# Patient Record
Sex: FEMALE | Race: BLACK OR AFRICAN AMERICAN | NOT HISPANIC OR LATINO | Employment: OTHER | URBAN - METROPOLITAN AREA
[De-identification: names, ages, dates, MRNs, and addresses within clinical notes are randomized per-mention and may not be internally consistent; named-entity substitution may affect disease eponyms.]

---

## 2017-02-17 ENCOUNTER — HOSPITAL ENCOUNTER (EMERGENCY)
Facility: MEDICAL CENTER | Age: 27
End: 2017-02-17
Attending: EMERGENCY MEDICINE
Payer: MEDICARE

## 2017-02-17 VITALS
TEMPERATURE: 98.4 F | OXYGEN SATURATION: 100 % | WEIGHT: 88.85 LBS | HEART RATE: 84 BPM | RESPIRATION RATE: 16 BRPM | SYSTOLIC BLOOD PRESSURE: 116 MMHG | BODY MASS INDEX: 15.74 KG/M2 | HEIGHT: 63 IN | DIASTOLIC BLOOD PRESSURE: 63 MMHG

## 2017-02-17 DIAGNOSIS — K04.7 DENTAL ABSCESS: ICD-10-CM

## 2017-02-17 PROCEDURE — 700101 HCHG RX REV CODE 250

## 2017-02-17 PROCEDURE — 700102 HCHG RX REV CODE 250 W/ 637 OVERRIDE(OP): Performed by: EMERGENCY MEDICINE

## 2017-02-17 PROCEDURE — 303977 HCHG I & D

## 2017-02-17 PROCEDURE — A9270 NON-COVERED ITEM OR SERVICE: HCPCS | Performed by: EMERGENCY MEDICINE

## 2017-02-17 PROCEDURE — 99283 EMERGENCY DEPT VISIT LOW MDM: CPT

## 2017-02-17 PROCEDURE — 304217 HCHG IRRIGATION SYSTEM

## 2017-02-17 RX ORDER — IBUPROFEN 800 MG/1
800 TABLET ORAL EVERY 8 HOURS PRN
Qty: 30 TAB | Refills: 0 | Status: SHIPPED | OUTPATIENT
Start: 2017-02-17 | End: 2017-05-26

## 2017-02-17 RX ORDER — LIDOCAINE HYDROCHLORIDE 20 MG/ML
INJECTION, SOLUTION INFILTRATION; PERINEURAL
Status: COMPLETED
Start: 2017-02-17 | End: 2017-02-17

## 2017-02-17 RX ORDER — IBUPROFEN 600 MG/1
600 TABLET ORAL ONCE
Status: COMPLETED | OUTPATIENT
Start: 2017-02-17 | End: 2017-02-17

## 2017-02-17 RX ORDER — CLINDAMYCIN HYDROCHLORIDE 150 MG/1
450 CAPSULE ORAL ONCE
Status: COMPLETED | OUTPATIENT
Start: 2017-02-17 | End: 2017-02-17

## 2017-02-17 RX ORDER — TRAMADOL HYDROCHLORIDE 50 MG/1
50 TABLET ORAL ONCE
Status: COMPLETED | OUTPATIENT
Start: 2017-02-17 | End: 2017-02-17

## 2017-02-17 RX ORDER — CLINDAMYCIN HYDROCHLORIDE 150 MG/1
450 CAPSULE ORAL 3 TIMES DAILY
Qty: 90 CAP | Refills: 0 | Status: SHIPPED | OUTPATIENT
Start: 2017-02-17 | End: 2017-02-27

## 2017-02-17 RX ORDER — HYDROCODONE BITARTRATE AND ACETAMINOPHEN 5; 325 MG/1; MG/1
1 TABLET ORAL EVERY 4 HOURS PRN
Qty: 12 TAB | Refills: 0 | Status: SHIPPED | OUTPATIENT
Start: 2017-02-17 | End: 2017-05-26

## 2017-02-17 RX ORDER — LIDOCAINE HYDROCHLORIDE 20 MG/ML
20 INJECTION, SOLUTION INFILTRATION; PERINEURAL ONCE
Status: COMPLETED | OUTPATIENT
Start: 2017-02-17 | End: 2017-02-17

## 2017-02-17 RX ADMIN — LIDOCAINE HYDROCHLORIDE 20 ML: 20 INJECTION, SOLUTION INFILTRATION; PERINEURAL at 09:15

## 2017-02-17 RX ADMIN — IBUPROFEN 600 MG: 600 TABLET, FILM COATED ORAL at 09:25

## 2017-02-17 RX ADMIN — CLINDAMYCIN HYDROCHLORIDE 450 MG: 150 CAPSULE ORAL at 09:25

## 2017-02-17 RX ADMIN — TRAMADOL HYDROCHLORIDE 50 MG: 50 TABLET, COATED ORAL at 09:25

## 2017-02-17 NOTE — ED AVS SNAPSHOT
CreaWor Access Code: [email or web address]  Expires: 3/19/2017 10:03 AM    Your email address is not on file at [facility].  Email Addresses are required for you to sign up for CreaWor, please contact [phone] to verify your personal information and to provide your email address prior to attempting to register for CreaWor.    Nanci Mccormack  [address] [place] [address]    CreaWor  A secure, online tool to manage your health information     [facility]’s CreaWor® is a secure, online tool that connects you to your personalized health information from the privacy of your home -- day or night - making it very easy for you to manage your healthcare. Once the activation process is completed, you can even access your medical information using the CreaWor iwona, which is available for free in the Apple Iwona store or Google Play store.     To learn more about CreaWor, visit [email or web address]    There are two levels of access available (as shown below):   My Chart Features  [facility] [place] Primary Care Doctor [facility]  Specialists [facility]  Urgent  Care Non-[facility] Primary Care Doctor   Email your healthcare team securely and privately 24/7 X X X    Manage appointments: schedule your next appointment; view details of past/upcoming appointments X      Request prescription refills. X      View recent personal medical records, including lab and immunizations X X X X   View health record, including health history, allergies, medications X X X X   Read reports about your outpatient visits, procedures, consult and ER notes X X X X   See your discharge summary, which is a recap of your hospital and/or ER visit that includes your diagnosis, lab results, and care plan X X  X     How to register for [employer]:  Once your e-mail address has been verified, follow the following steps to sign up for CreaWor.     1. Go to  [email or web address]  2. Click on the Sign Up Now box, which takes you to the New Member Sign Up page. You will need  to provide the following information:  a. Enter your [facility]Care Access Code exactly as it appears at the top of this page. (You will not need to use this code after you’ve completed the sign-up process. If you do not sign up before the expiration date, you must request a new code.)   b. Enter your date of birth.   c. Enter your home email address.   d. Click Submit, and follow the next screen’s instructions.  3. Create a ChipCare ID. This will be your ChipCare login ID and cannot be changed, so think of one that is secure and easy to remember.  4. Create a ChipCare password. You can change your password at any time.  5. Enter your Password Reset Question and Answer. This can be used at a later time if you forget your password.   6. Enter your e-mail address. This allows you to receive e-mail notifications when new information is available in ChipCare.  7. Click Sign Up. You can now view your health information.    For assistance activating your ChipCare account, call [phone]

## 2017-02-17 NOTE — ED NOTES
Chief Complaint   Patient presents with   • Dental Pain     Right upper x 1 week.  Pt reports she has a history of broken tooth and has no dental care.    Pt to triage in NAD.  Pt educated on triage process and instructed to notify triage RN of any change in status.

## 2017-02-17 NOTE — ED NOTES
Late Entry: Patient medicated for pain and first dose of abx given.      Patient given discharge instructions, prescriptions x 3, and follow up information, instructed not to drive while taking narcotics, provided with dental referral list, verbalized understanding.

## 2017-02-17 NOTE — ED AVS SNAPSHOT
2/17/2017          Nanci Mccormack  [address] [place] [address]    Dear Nanci:    [facility] wants to ensure your discharge home is safe and you or your loved ones have had all your questions answered regarding your care after you leave the hospital.    You may receive a telephone call within two days of your discharge.  This call is to make certain you understand your discharge instructions as well as ensure we provided you with the best care possible during your stay with us.     The call will only last approximately 3-5 minutes and will be done by a nurse.    Once again, we want to ensure your discharge home is safe and that you have a clear understanding of any next steps in your care.  If you have any questions or concerns, please do not hesitate to contact us, we are here for you.  Thank you for choosing [facility] for your healthcare needs.    Sincerely,    Gael Toribio    [facility]

## 2017-02-17 NOTE — DISCHARGE INSTRUCTIONS
Fill your antibiotic prescription and use it as directed until all pills are gone, even if you feel better 1st. We've prescribed some pain medication. You have a few days of Norco, and then a prescription for Motrin which you should switch to after the Norco is gone. It is extremely important that you use the dental referral sheet we have provided to schedule a dental visit. The tooth that is causing your problems needs to be extracted. The [facility] dental clinic may be the easiest and least expensive and fastest place to have this done. Return to the ER for worsening symptoms.    Dental Abscess  A dental abscess is pus in or around a tooth.  HOME CARE  · Take medicines only as told by your dentist.  · If you were prescribed antibiotic medicine, finish all of it even if you start to feel better.  · Rinse your mouth (gargle) often with salt water.  · Do not drive or use heavy machinery, like a , while taking pain medicine.  · Do not apply heat to the outside of your mouth.  · Keep all follow-up visits as told by your dentist. This is important.  GET HELP IF:  · Your pain is worse, and medicine does not help.  GET HELP RIGHT AWAY IF:  · You have a fever or chills.  · Your symptoms suddenly get worse.  · You have a very bad headache.  · You have problems breathing or swallowing.  · You have trouble opening your mouth.  · You have puffiness (swelling) in your neck or around your eye.     This information is not intended to replace advice given to you by your health care provider. Make sure you discuss any questions you have with your health care provider.     Document Released: 05/03/2016 Document Reviewed: 12/15/2015  [employer] Interactive Patient Education ©2016 [employer]

## 2017-02-17 NOTE — ED AVS SNAPSHOT
Home Care Instructions                                                                                                                Nanci Mccormack   MRN: [id number]    Department:  [facility], Emergency Dept   Date of Visit:  2/17/2017            [facility], Emergency Dept    [address]    [place] [address]    Phone:  [phone]      You were seen by     Sanjeev Huggins M.D.      Your Diagnosis Was     Dental abscess     K04.7       These are the medications you received during your hospitalization from 02/17/2017 0834 to 02/17/2017 1003     Date/Time Order Dose Route Action    02/17/2017 0925 clindamycin (CLEOCIN) capsule 450 mg 450 mg Oral Given    02/17/2017 0925 ibuprofen (MOTRIN) tablet 600 mg 600 mg Oral Given    02/17/2017 0925 tramadol (ULTRAM) 50 MG tablet 50 mg 50 mg Oral Given    02/17/2017 0915 lidocaine (XYLOCAINE) 2 % injection 20 mL 20 mL Other Given      Medication Information     Review all of your home medications and newly ordered medications with your primary doctor and/or pharmacist as soon as possible. Follow medication instructions as directed by your doctor and/or pharmacist.     Please keep your complete medication list with you and share with your physician. Update the information when medications are discontinued, doses are changed, or new medications (including over-the-counter products) are added; and carry medication information at all times in the event of emergency situations.               Medication List      START taking these medications        Instructions    clindamycin 150 MG Caps   Commonly known as:  CLEOCIN    Take 3 Caps by mouth 3 times a day for 10 days.   Dose:  450 mg       hydrocodone-acetaminophen 5-325 MG Tabs per tablet   Commonly known as:  NORCO    Take 1 Tab by mouth every four hours as needed.   Dose:  1 Tab       ibuprofen 800 MG Tabs   Commonly known as:  MOTRIN    Take 1 Tab by mouth every 8 hours as  needed.   Dose:  800 mg         ASK your doctor about these medications        Instructions    acetaminophen/caffeine/butalbital 300-40-50 mg -40 MG Caps capsule   Commonly known as:  FIORICET    Take 1 Cap by mouth every four hours as needed for Headache.   Dose:  1 Cap       Glatiramer Acetate 20 MG/ML Sosy   Commonly known as:  COPAXONE    Inject 40 mg as instructed every 3 days.   Dose:  40 mg                 Discharge Instructions       Fill your antibiotic prescription and use it as directed until all pills are gone, even if you feel better 1st. We've prescribed some pain medication. You have a few days of Norco, and then a prescription for Motrin which you should switch to after the Norco is gone. It is extremely important that you use the dental referral sheet we have provided to schedule a dental visit. The tooth that is causing your problems needs to be extracted. The [facility] dental clinic may be the easiest and least expensive and fastest place to have this done. Return to the ER for worsening symptoms.    Dental Abscess  A dental abscess is pus in or around a tooth.  HOME CARE  · Take medicines only as told by your dentist.  · If you were prescribed antibiotic medicine, finish all of it even if you start to feel better.  · Rinse your mouth (gargle) often with salt water.  · Do not drive or use heavy machinery, like a , while taking pain medicine.  · Do not apply heat to the outside of your mouth.  · Keep all follow-up visits as told by your dentist. This is important.  GET HELP IF:  · Your pain is worse, and medicine does not help.  GET HELP RIGHT AWAY IF:  · You have a fever or chills.  · Your symptoms suddenly get worse.  · You have a very bad headache.  · You have problems breathing or swallowing.  · You have trouble opening your mouth.  · You have puffiness (swelling) in your neck or around your eye.     This information is not intended to replace advice given to you by your  health care provider. Make sure you discuss any questions you have with your health care provider.     Document Released: 05/03/2016 Document Reviewed: 12/15/2015  Elsevier Interactive Patient Education ©2016 [employer].io Inc.            Patient Information     Patient Information    Following emergency treatment: all patient requiring follow-up care must return either to a private physician or a clinic if your condition worsens before you are able to obtain further medical attention, please return to the emergency room.     Billing Information    At [facility], we work to make the billing process streamlined for our patients.  Our Representatives are here to answer any questions you may have regarding your hospital bill.  If you have insurance coverage and have supplied your insurance information to us, we will submit a claim to your insurer on your behalf.  Should you have any questions regarding your bill, we can be reached online or by phone as follows:  Online: You are able pay your bills online or live chat with our representatives about any billing questions you may have. We are here to help Monday - Friday from 8:00am to 7:30pm and 9:00am - 12:00pm on Saturdays.  Please visit [email or web address]  for more information.   Phone:  [phone] or [phone]    Please note that your emergency physician, surgeon, pathologist, radiologist, anesthesiologist, and other specialists are not employed by [facility] and will therefore bill separately for their services.  Please contact them directly for any questions concerning their bills at the numbers below:     Emergency Physician Services:  [phone]  [facility]:  [phone]  [facility]:  [phone]  [facility]:  [phone]    1. Your final bill may vary from the amount quoted upon discharge if all procedures are not complete at that time, or if your doctor has additional  procedures of which we are not aware. You will receive an additional bill if you return to the Emergency Department at [facility] for suture removal regardless of the facility of which the sutures were placed.     2. Please arrange for settlement of this account at the emergency registration.    3. All self-pay accounts are due in full at the time of treatment.  If you are unable to meet this obligation then payment is expected within 4-5 days.     4. If you have had radiology studies (CT, X-ray, Ultrasound, MRI), you have received a preliminary result during your emergency department visit. Please contact the radiology department [phone] to receive a copy of your final result. Please discuss the Final result with your primary physician or with the follow up physician provided.     Crisis Hotline:  [place] Crisis Hotline:  0-568-LYVEIBQ or 1-806.264.2313  [place] Crisis Hotline:    1-636.687.2914 or 906-853-8969         ED Discharge Follow Up Questions    1. In order to provide you with very good care, we would like to follow up with a phone call in the next few days.  May we have your permission to contact you?     YES /  NO    2. What is the best phone number to call you? (       )_____-__________    3. What is the best time to call you?      Morning  /  Afternoon  /  Evening                   Patient Signature:  ____________________________________________________________    Date:  ____________________________________________________________

## 2017-02-17 NOTE — ED PROVIDER NOTES
ED Provider Note    Scribed for Sanjeev Huggins M.D. by Aline Cleary. 2/17/2017  9:07 AM    CHIEF COMPLAINT  Chief Complaint   Patient presents with   • Dental Pain     Right upper x 1 week.  Pt reports she has a history of broken tooth and has no dental care.      HPI  Nanci Mccormack is a 27 y.o. Female with a history of MS who presents to the Emergency Room for right upper dental pain onset one week ago. She describes the pain as constant, dull and aching. The patient notes that she has a history of a broken tooth in that location.  She states that she is experiencing jaw pain, headache, and neck pain secondary to her dental pain.  The patient states that she has not seen a dentist nor does she receive any regular dental care.  The patient denies any fevers or chills.   Is constant, aggressively reverse, moderate to severe, and she does not have any significant relief from over-the-counter medications.    REVIEW OF SYSTEMS  See HPI for further details.     PAST MEDICAL HISTORY   has a past medical history of MS (multiple sclerosis) (CMS-[facility]).    SOCIAL HISTORY  Social History     Social History Main Topics   • Smoking status: Never Smoker    • Alcohol Use: Yes   • Drug Use: Yes     Special: Inhaled      Comment: daily marijuana     SURGICAL HISTORY  patient denies any surgical history    CURRENT MEDICATIONS  No current facility-administered medications on file prior to encounter.     Current Outpatient Prescriptions on File Prior to Encounter   Medication Sig Dispense Refill   • Glatiramer Acetate (COPAXONE) 20 MG/ML Solution Prefilled Syringe Inject 40 mg as instructed every 3 days. 40 Syringe 11   • acetaminophen/caffeine/butalbital 300-40-50 mg (FIORICET) -40 MG Cap capsule Take 1 Cap by mouth every four hours as needed for Headache. 10 Cap 0     ALLERGIES  Allergies   Allergen Reactions   • Pcn [Penicillins]      Hives       PHYSICAL EXAM  VITAL SIGNS: /63 mmHg  Pulse 78   "Temp(Src) 36.9 °C (98.4 °F) (Temporal)  Resp 14  Ht 1.6 m (5' 3\")  Wt 40.3 kg (88 lb 13.5 oz)  BMI 15.74 kg/m2  SpO2 100%  Pulse ox interpretation: I interpret this pulse ox as normal.  Constitutional: Alert in no apparent distress.  HENT: Normocephalic, Atraumatic, Bilateral external ears normal. Nose normal. First right maxillary molar chronically eroded to the gumline, anterior to that is a 2cm x 1cm fluctuant abscess in the buccal reflection   Eyes: Pupils are equal and reactive. Conjunctiva normal, non-icteric.   Heart: Regular rate and rythm, no murmurs.    Lungs: Clear to auscultation bilaterally.  Skin: Warm, Dry, No erythema, No rash.   Neurologic: Alert, Grossly non-focal.   Psychiatric: Affect normal, Judgment normal, Mood normal, Appears appropriate and not intoxicated.     Incision and Drainage Procedure Note    Indication: Abscess    Procedure: The patient was positioned appropriately. Local anesthesia was obtained by infiltration using 2% Lidocaine without epinephrine.  An incision was then made over the greatest area of fluctuance and approximately 3 cc of purulent material was expressed.   Patient was instructed to perform swish and spit through 12 ounces of water, on September time to remove   Any additional material.    The patient tolerated the procedure well.    Complications: None    COURSE & MEDICAL DECISION MAKING  The patient's VS, Nurses notes reviewed. (See chart for details)    9:07 AM Patient seen and examined at bedside. I explained that I will need to drain the patient's dental abscess. would provide the patient with a list of dentists that will accept her Medicaid so that she can receive dental care for her tooth and to prevent additional dental caries. Patient will be treated with 2% lidocaine 20mL injection, Cleocin 450mg capsule, Motrin 600mg tablet, Ultram 50mg tablet for her symptoms.     9:16 AM Numbed patient's mouth with 1cc 2% Lidocaine injection.     9:58 AM  Abscess " "drained, see procedure note above.     10:00 AM  - Re-examined; The patient is resting in bed. I discussed plans for discharge with a prescription for Cleocin 150mg tablet, Motrin 800mg tablet, Norco 5-325mg tablet. She was given a referral to follow up with a dentist and her primary care provider and instructed to return to the ED if her symptoms worsen.  She was given a dental referral sheet, and we discussed specifically the heart clinic and suppression of the dental clinic may be the most expedient unaffordable.  We specifically discussed the risks of not having this urinary tooth definitively managed. Patient understands and agrees. Her vitals prior to discharge are: /63 mmHg  Pulse 84  Temp(Src) 36.9 °C (98.4 °F) (Temporal)  Resp 16  Ht 1.6 m (5' 3\")  Wt 40.3 kg (88 lb 13.5 oz)  BMI 15.74 kg/m2  SpO2 100%    I reviewed prescription monitoring program for patient's narcotic use before prescribing a scheduled drug.The patient will not drink alcohol nor drive with prescribed medications. The patient will return for new or worsening symptoms and is stable at the time of discharge.    DISPOSITION:  Patient will be discharged home in stable condition.    FOLLOW UP:  No follow-up provider specified.    OUTPATIENT MEDICATIONS:  Discharge Medication List as of 2/17/2017 10:03 AM      START taking these medications    Details   clindamycin (CLEOCIN) 150 MG Cap Take 3 Caps by mouth 3 times a day for 10 days., Disp-90 Cap, R-0, Print Rx Paper      ibuprofen (MOTRIN) 800 MG Tab Take 1 Tab by mouth every 8 hours as needed., Disp-30 Tab, R-0, Print Rx Paper      hydrocodone-acetaminophen (NORCO) 5-325 MG Tab per tablet Take 1 Tab by mouth every four hours as needed., Disp-12 Tab, R-0, Print Rx Paper           FINAL IMPRESSION  1. Dental abscess         I, Aline Cleary (Carl), am scribing for, and in the presence of, Sanjeev Huggins M.D..    Electronically signed by: Aline BENITEZ " Madiha (Scribe), 2/17/2017    I, Sanjeev Huggins M.D. personally performed the services described in this documentation, as scribed by Aline Cleary in my presence, and it is both accurate and complete.    Scribed for Sanjeev Huggins M.D. by Aline Cleary. 2/17/201

## 2017-05-26 ENCOUNTER — APPOINTMENT (OUTPATIENT)
Dept: RADIOLOGY | Facility: MEDICAL CENTER | Age: 27
End: 2017-05-26
Attending: EMERGENCY MEDICINE
Payer: MEDICARE

## 2017-05-26 ENCOUNTER — HOSPITAL ENCOUNTER (EMERGENCY)
Facility: MEDICAL CENTER | Age: 27
End: 2017-05-26
Attending: EMERGENCY MEDICINE
Payer: MEDICARE

## 2017-05-26 VITALS
TEMPERATURE: 99.6 F | BODY MASS INDEX: 17.85 KG/M2 | HEIGHT: 62 IN | RESPIRATION RATE: 18 BRPM | SYSTOLIC BLOOD PRESSURE: 110 MMHG | WEIGHT: 97 LBS | OXYGEN SATURATION: 98 % | DIASTOLIC BLOOD PRESSURE: 77 MMHG | HEART RATE: 92 BPM

## 2017-05-26 DIAGNOSIS — M25.551 RIGHT HIP PAIN: ICD-10-CM

## 2017-05-26 LAB
ALBUMIN SERPL BCP-MCNC: 4.8 G/DL (ref 3.2–4.9)
ALBUMIN/GLOB SERPL: 1.7 G/DL
ALP SERPL-CCNC: 50 U/L (ref 30–99)
ALT SERPL-CCNC: 9 U/L (ref 2–50)
ANION GAP SERPL CALC-SCNC: 9 MMOL/L (ref 0–11.9)
AST SERPL-CCNC: 17 U/L (ref 12–45)
BASOPHILS # BLD AUTO: 0.5 % (ref 0–1.8)
BASOPHILS # BLD: 0.06 K/UL (ref 0–0.12)
BILIRUB SERPL-MCNC: 0.5 MG/DL (ref 0.1–1.5)
BUN SERPL-MCNC: 11 MG/DL (ref 8–22)
CALCIUM SERPL-MCNC: 9.8 MG/DL (ref 8.5–10.5)
CHLORIDE SERPL-SCNC: 107 MMOL/L (ref 96–112)
CK SERPL-CCNC: 71 U/L (ref 0–154)
CO2 SERPL-SCNC: 23 MMOL/L (ref 20–33)
CREAT SERPL-MCNC: 0.59 MG/DL (ref 0.5–1.4)
CRP SERPL HS-MCNC: <0.02 MG/DL (ref 0–0.75)
EOSINOPHIL # BLD AUTO: 0.13 K/UL (ref 0–0.51)
EOSINOPHIL NFR BLD: 1 % (ref 0–6.9)
ERYTHROCYTE [DISTWIDTH] IN BLOOD BY AUTOMATED COUNT: 44.2 FL (ref 35.9–50)
ERYTHROCYTE [SEDIMENTATION RATE] IN BLOOD BY WESTERGREN METHOD: 6 MM/HOUR (ref 0–20)
GFR SERPL CREATININE-BSD FRML MDRD: >60 ML/MIN/1.73 M 2
GLOBULIN SER CALC-MCNC: 2.9 G/DL (ref 1.9–3.5)
GLUCOSE SERPL-MCNC: 86 MG/DL (ref 65–99)
HCG SERPL QL: NEGATIVE
HCT VFR BLD AUTO: 43.1 % (ref 37–47)
HGB BLD-MCNC: 14.7 G/DL (ref 12–16)
IMM GRANULOCYTES # BLD AUTO: 0.03 K/UL (ref 0–0.11)
IMM GRANULOCYTES NFR BLD AUTO: 0.2 % (ref 0–0.9)
LYMPHOCYTES # BLD AUTO: 2.92 K/UL (ref 1–4.8)
LYMPHOCYTES NFR BLD: 22.9 % (ref 22–41)
MCH RBC QN AUTO: 31.8 PG (ref 27–33)
MCHC RBC AUTO-ENTMCNC: 34.1 G/DL (ref 33.6–35)
MCV RBC AUTO: 93.3 FL (ref 81.4–97.8)
MONOCYTES # BLD AUTO: 0.39 K/UL (ref 0–0.85)
MONOCYTES NFR BLD AUTO: 3.1 % (ref 0–13.4)
NEUTROPHILS # BLD AUTO: 9.23 K/UL (ref 2–7.15)
NEUTROPHILS NFR BLD: 72.3 % (ref 44–72)
NRBC # BLD AUTO: 0 K/UL
NRBC BLD AUTO-RTO: 0 /100 WBC
PLATELET # BLD AUTO: 258 K/UL (ref 164–446)
PMV BLD AUTO: 9.8 FL (ref 9–12.9)
POTASSIUM SERPL-SCNC: 4.1 MMOL/L (ref 3.6–5.5)
PROT SERPL-MCNC: 7.7 G/DL (ref 6–8.2)
RBC # BLD AUTO: 4.62 M/UL (ref 4.2–5.4)
SODIUM SERPL-SCNC: 139 MMOL/L (ref 135–145)
WBC # BLD AUTO: 12.8 K/UL (ref 4.8–10.8)

## 2017-05-26 PROCEDURE — 36415 COLL VENOUS BLD VENIPUNCTURE: CPT

## 2017-05-26 PROCEDURE — A9579 GAD-BASE MR CONTRAST NOS,1ML: HCPCS | Performed by: EMERGENCY MEDICINE

## 2017-05-26 PROCEDURE — 73723 MRI JOINT LWR EXTR W/O&W/DYE: CPT | Mod: RT

## 2017-05-26 PROCEDURE — 82550 ASSAY OF CK (CPK): CPT

## 2017-05-26 PROCEDURE — 96376 TX/PRO/DX INJ SAME DRUG ADON: CPT

## 2017-05-26 PROCEDURE — 85652 RBC SED RATE AUTOMATED: CPT

## 2017-05-26 PROCEDURE — 80053 COMPREHEN METABOLIC PANEL: CPT

## 2017-05-26 PROCEDURE — 700117 HCHG RX CONTRAST REV CODE 255: Performed by: EMERGENCY MEDICINE

## 2017-05-26 PROCEDURE — 700111 HCHG RX REV CODE 636 W/ 250 OVERRIDE (IP): Performed by: EMERGENCY MEDICINE

## 2017-05-26 PROCEDURE — 86140 C-REACTIVE PROTEIN: CPT

## 2017-05-26 PROCEDURE — 85025 COMPLETE CBC W/AUTO DIFF WBC: CPT

## 2017-05-26 PROCEDURE — 96375 TX/PRO/DX INJ NEW DRUG ADDON: CPT

## 2017-05-26 PROCEDURE — 84703 CHORIONIC GONADOTROPIN ASSAY: CPT

## 2017-05-26 PROCEDURE — 99284 EMERGENCY DEPT VISIT MOD MDM: CPT

## 2017-05-26 PROCEDURE — 73502 X-RAY EXAM HIP UNI 2-3 VIEWS: CPT | Mod: RT

## 2017-05-26 PROCEDURE — 96374 THER/PROPH/DIAG INJ IV PUSH: CPT

## 2017-05-26 RX ORDER — ONDANSETRON 2 MG/ML
4 INJECTION INTRAMUSCULAR; INTRAVENOUS ONCE
Status: COMPLETED | OUTPATIENT
Start: 2017-05-26 | End: 2017-05-26

## 2017-05-26 RX ORDER — METHYLPREDNISOLONE 4 MG/1
4 TABLET ORAL DAILY
Qty: 30 TAB | Refills: 0 | Status: SHIPPED | OUTPATIENT
Start: 2017-05-26 | End: 2017-07-20

## 2017-05-26 RX ORDER — LORAZEPAM 2 MG/ML
1 INJECTION INTRAMUSCULAR
Status: DISCONTINUED | OUTPATIENT
Start: 2017-05-26 | End: 2017-05-26 | Stop reason: HOSPADM

## 2017-05-26 RX ORDER — HYDROCODONE BITARTRATE AND ACETAMINOPHEN 5; 325 MG/1; MG/1
1-2 TABLET ORAL EVERY 4 HOURS PRN
Qty: 20 TAB | Refills: 0 | Status: SHIPPED | OUTPATIENT
Start: 2017-05-26 | End: 2017-07-20

## 2017-05-26 RX ADMIN — GADODIAMIDE 10 ML: 287 INJECTION INTRAVENOUS at 14:59

## 2017-05-26 RX ADMIN — HYDROMORPHONE HYDROCHLORIDE 1 MG: 1 INJECTION, SOLUTION INTRAMUSCULAR; INTRAVENOUS; SUBCUTANEOUS at 12:24

## 2017-05-26 RX ADMIN — LORAZEPAM 1 MG: 2 INJECTION INTRAMUSCULAR; INTRAVENOUS at 14:12

## 2017-05-26 RX ADMIN — HYDROMORPHONE HYDROCHLORIDE 1 MG: 1 INJECTION, SOLUTION INTRAMUSCULAR; INTRAVENOUS; SUBCUTANEOUS at 10:45

## 2017-05-26 RX ADMIN — ONDANSETRON 4 MG: 2 INJECTION INTRAMUSCULAR; INTRAVENOUS at 10:45

## 2017-05-26 ASSESSMENT — PAIN SCALES - GENERAL
PAINLEVEL_OUTOF10: 10
PAINLEVEL_OUTOF10: 8
PAINLEVEL_OUTOF10: 7

## 2017-05-26 NOTE — ED AVS SNAPSHOT
5/26/2017    Nanci Mccormack  [address] [place] [address]    Dear Nanci:    Psychiatric hospital wants to ensure your discharge home is safe and you or your loved ones have had all of your questions answered regarding your care after you leave the hospital.    Below is a list of resources and contact information should you have any questions regarding your hospital stay, follow-up instructions, or active medical symptoms.    Questions or Concerns Regarding… Contact   Medical Questions Related to Your Discharge  (7 days a week, 8am-5pm) Contact a Nurse Care Coordinator   [phone]   Medical Questions Not Related to Your Discharge  (24 hours a day / 7 days a week)  Contact the Nurse Health Line   [phone]    Medications or Discharge Instructions Refer to your discharge packet   or contact your [facility] Primary Care Provider   [phone]   Follow-up Appointment(s) Schedule your appointment via [employer]   or contact Scheduling [phone]   Billing Review your statement via [employer]  or contact Billing [phone]   Medical Records Review your records via [employer]   or contact Medical Records [phone]     You may receive a telephone call within two days of discharge. This call is to make certain you understand your discharge instructions and have the opportunity to have any questions answered. You can also easily access your medical information, test results and upcoming appointments via the [employer] free online health management tool. You can learn more and sign up at [employer]. For assistance setting up your [employer] account, please call [phone].    Once again, we want to ensure your discharge home is safe and that you have a clear understanding of any next steps in your care. If you have any questions or concerns, please do not hesitate to contact us, we are here for you. Thank you for choosing [facility] for your healthcare needs.    Sincerely,    Your [facility] Healthcare Team

## 2017-05-26 NOTE — ED PROVIDER NOTES
"ED Provider Note    CHIEF COMPLAINT  Chief Complaint   Patient presents with   • Leg Pain     right thigh pain/spasms. denies injury        HPI  Nanci Mccormack is a 27 y.o. female who presents for evaluation of severe right thigh pain, right hip pain. She is a history of multiple sclerosis. She is followed by neurology. She denies IV drugs. No high fevers or chills. She cannot distinctly recall any traumatic injury. She reports significant pain with range of motion. No high fevers or chills. Reports pain so bad that she cannot bear weight. No other symptoms noted    REVIEW OF SYSTEMS  See HPI for further details. No high fevers chills night sweats weight loss numbness tingling or weakness All other systems are negative.     PAST MEDICAL HISTORY  Past Medical History   Diagnosis Date   • MS (multiple sclerosis) ([facility])        FAMILY HISTORY  No history of bleeding disorder    SOCIAL HISTORY  Social History     Social History   • Marital Status: Single     Spouse Name: N/A   • Number of Children: N/A   • Years of Education: N/A     Social History Main Topics   • Smoking status: Never Smoker    • Smokeless tobacco: None   • Alcohol Use: Yes   • Drug Use: Yes     Special: Inhaled      Comment: daily marijuana   • Sexual Activity: Not Asked     Other Topics Concern   • None     Social History Narrative    denies IV drugs occasional marijuana    SURGICAL HISTORY  History reviewed. No pertinent past surgical history.  No major surgeries reported  CURRENT MEDICATIONS  Home Medications     Reviewed by Anayeli Gonzalez R.N. (Registered Nurse) on 05/26/17 at 1017  Med List Status: Partial    Medication Last Dose Status    Glatiramer Acetate (COPAXONE) 20 MG/ML Solution Prefilled Syringe 5/26/2017 Active                ALLERGIES  Allergies   Allergen Reactions   • Pcn [Penicillins]      Hives         PHYSICAL EXAM  VITAL SIGNS: /77 mmHg  Pulse 92  Temp(Src) 37.6 °C (99.6 °F)  Resp 18  Ht 1.575 m (5' 2\")  Wt 43.999 " kg (97 lb)  BMI 17.74 kg/m2  SpO2 98%  LMP 05/12/2017 Room air O2: 100    Constitutional: She appears uncomfortable  HENT: Normocephalic, Atraumatic, Bilateral external ears normal, Oropharynx moist, No oral exudates, Nose normal.   Eyes: PERRLA, EOMI, Conjunctiva normal, No discharge.   Neck: Normal range of motion, No tenderness, Supple, No stridor.   Cardiovascular: Normal heart rate, Normal rhythm, No murmurs, No rubs, No gallops.   Thorax & Lungs: Normal breath sounds, No respiratory distress, No wheezing, No chest tenderness.   Abdomen: Bowel sounds normal, Soft, No tenderness, No masses, No pulsatile masses.   Skin: Warm, Dry, No erythema, No rash.   Back: No tenderness, No CVA tenderness.   Extremities: Significant pain with range of motion on the right hip. No palpable effusion. No overlying skin changes no bony tenderness on the right knee or ankle. Distal neurovascular exam is normal good pulses   Neurologic: Alert & oriented x 3, Normal motor function, Normal sensory function, No focal deficits noted.   Psychiatric: Affect normal, Judgment normal, Mood normal.       RADIOLOGY/PROCEDURES  MR-HIP WITH & W/O RIGHT   Final Result      1.  No MR evidence for osteomyelitis.      2.  No significant hip effusion or synovial enhancement.      3.  2.7 cm vaginal cyst.      DX-HIP-COMPLETE - UNILATERAL 2+ RIGHT   Final Result      No radiographic evidence of acute traumatic injury.          Results for orders placed or performed during the hospital encounter of 05/26/17   CRP QUANTITIVE (NON-CARDIAC)   Result Value Ref Range    Stat C-Reactive Protein <0.02 0.00 - 0.75 mg/dL   WESTERGREN SED RATE   Result Value Ref Range    Sed Rate Westergren 6 0 - 20 mm/hour   CBC WITH DIFFERENTIAL   Result Value Ref Range    WBC 12.8 (H) 4.8 - 10.8 K/uL    RBC 4.62 4.20 - 5.40 M/uL    Hemoglobin 14.7 12.0 - 16.0 g/dL    Hematocrit 43.1 37.0 - 47.0 %    MCV 93.3 81.4 - 97.8 fL    MCH 31.8 27.0 - 33.0 pg    MCHC 34.1 33.6 -  35.0 g/dL    RDW 44.2 35.9 - 50.0 fL    Platelet Count 258 164 - 446 K/uL    MPV 9.8 9.0 - 12.9 fL    Neutrophils-Polys 72.30 (H) 44.00 - 72.00 %    Lymphocytes 22.90 22.00 - 41.00 %    Monocytes 3.10 0.00 - 13.40 %    Eosinophils 1.00 0.00 - 6.90 %    Basophils 0.50 0.00 - 1.80 %    Immature Granulocytes 0.20 0.00 - 0.90 %    Nucleated RBC 0.00 /100 WBC    Neutrophils (Absolute) 9.23 (H) 2.00 - 7.15 K/uL    Lymphs (Absolute) 2.92 1.00 - 4.80 K/uL    Monos (Absolute) 0.39 0.00 - 0.85 K/uL    Eos (Absolute) 0.13 0.00 - 0.51 K/uL    Baso (Absolute) 0.06 0.00 - 0.12 K/uL    Immature Granulocytes (abs) 0.03 0.00 - 0.11 K/uL    NRBC (Absolute) 0.00 K/uL   COMP METABOLIC PANEL   Result Value Ref Range    Sodium 139 135 - 145 mmol/L    Potassium 4.1 3.6 - 5.5 mmol/L    Chloride 107 96 - 112 mmol/L    Co2 23 20 - 33 mmol/L    Anion Gap 9.0 0.0 - 11.9    Glucose 86 65 - 99 mg/dL    Bun 11 8 - 22 mg/dL    Creatinine 0.59 0.50 - 1.40 mg/dL    Calcium 9.8 8.5 - 10.5 mg/dL    AST(SGOT) 17 12 - 45 U/L    ALT(SGPT) 9 2 - 50 U/L    Alkaline Phosphatase 50 30 - 99 U/L    Total Bilirubin 0.5 0.1 - 1.5 mg/dL    Albumin 4.8 3.2 - 4.9 g/dL    Total Protein 7.7 6.0 - 8.2 g/dL    Globulin 2.9 1.9 - 3.5 g/dL    A-G Ratio 1.7 g/dL   HCG QUAL SERUM   Result Value Ref Range    Beta-Hcg Qualitative Serum Negative Negative   CREATINE KINASE   Result Value Ref Range    CPK Total 71 0 - 154 U/L   ESTIMATED GFR   Result Value Ref Range    GFR If African American >60 >60 mL/min/1.73 m 2    GFR If Non African American >60 >60 mL/min/1.73 m 2      COURSE & MEDICAL DECISION MAKING  Pertinent Labs & Imaging studies reviewed. (See chart for details)  An IV was established. The patient was given IV pain and nausea medication. Initial radiograph is negative white count was slightly elevated but her inflammatory markers were normal. She continued to have significant pain with range of motion the right hip. She does not have discrete risk factors such as  HIV or IV drug use for septic hip or with the level pain always worried about the case. Subsequent MRI of the hip did not penetrate any inflammation or any obvious effusion strongly suggesting against septic hip. I think she likely has a severe hip strain. I'll send her home on NSAIDs pain meds and they drove dose pack. I recommended following up here or with her PCP in 1-2 days if symptoms progress or worsen    FINAL IMPRESSION  1.   1. Right hip pain             Electronically signed by: Rickie De La Cruz, 5/26/2017 10:33 AM

## 2017-05-26 NOTE — ED NOTES
Discharge instructions given.  All questions answered.  Prescriptions given x2.  Pt to follow-up with pcp, return here if symptoms worsen.  Pt verbalized understanding.  All belongings with pt.  Pt ambulated with use of crutches to lobby.

## 2017-05-26 NOTE — ED NOTES
"Chief Complaint   Patient presents with   • Leg Pain     after walking up the stairs last night. pt reports that she has not injured leg. no pain when sitting only when attempting to bare weight     Hx MS.  Blood pressure 110/77, pulse 94, temperature 37.6 °C (99.6 °F), resp. rate 20, height 1.575 m (5' 2\"), SpO2 100 %.  Pt informed of wait times. Educated on triage process.  Asked to return to triage RN for any new or worsening of symptoms. Thanked for patience.        "

## 2017-05-26 NOTE — ED NOTES
Report rec'd from SHIVANI Guadalupe.  Pt reports pain increasing since xray.  Pt requesting more pain medication.  Will inform ERP.

## 2017-05-26 NOTE — ED AVS SNAPSHOT
DecoSnap Access Code: [email or web address]  Expires: 6/11/2017 10:59 AM    Your email address is not on file at [facility].  Email Addresses are required for you to sign up for DecoSnap, please contact [phone] to verify your personal information and to provide your email address prior to attempting to register for DecoSnap.    Nanci Mccormack  [address] [place] [address]    DecoSnap  A secure, online tool to manage your health information     [facility]’s DecoSnap® is a secure, online tool that connects you to your personalized health information from the privacy of your home -- day or night - making it very easy for you to manage your healthcare. Once the activation process is completed, you can even access your medical information using the DecoSnap iwona, which is available for free in the Apple Iwona store or Google Play store.     To learn more about DecoSnap, visit [email or web address]    There are two levels of access available (as shown below):   My Chart Features  [facility] Primary Care Doctor [facility]  Specialists [facility]  Urgent  Care Non-[facility] Primary Care Doctor   Email your healthcare team securely and privately 24/7 X X X    Manage appointments: schedule your next appointment; view details of past/upcoming appointments X      Request prescription refills. X      View recent personal medical records, including lab and immunizations X X X X   View health record, including health history, allergies, medications X X X X   Read reports about your outpatient visits, procedures, consult and ER notes X X X X   See your discharge summary, which is a recap of your hospital and/or ER visit that includes your diagnosis, lab results, and care plan X X  X     How to register for Lightspeedt:  Once your e-mail address has been verified, follow the following steps to sign up for Lightspeedt.     1. Go to  [email or web address]  2. Click on the Sign Up Now box, which takes you to the New Member Sign Up page. You will need  to provide the following information:  a. Enter your [employer] Access Code exactly as it appears at the top of this page. (You will not need to use this code after you’ve completed the sign-up process. If you do not sign up before the expiration date, you must request a new code.)   b. Enter your date of birth.   c. Enter your home email address.   d. Click Submit, and follow the next screen’s instructions.  3. Create a T[employer]koops ID. This will be your [employer] login ID and cannot be changed, so think of one that is secure and easy to remember.  4. Create a [employer] password. You can change your password at any time.  5. Enter your Password Reset Question and Answer. This can be used at a later time if you forget your password.   6. Enter your e-mail address. This allows you to receive e-mail notifications when new information is available in T[employer]ko[employer].  7. Click Sign Up. You can now view your health information.    For assistance activating your [employer] account, call [phone]

## 2017-05-26 NOTE — ED AVS SNAPSHOT
Home Care Instructions                                                                                                                Nanci Mccormack   MRN: [id number]    Department:  [facility], Emergency Dept   Date of Visit:  5/26/2017            [facility], Emergency Dept    [address] [address]    Phone:  [phone]      You were seen by     Rickie De La Cruz M.D.      Your Diagnosis Was     Right hip pain     M25.551       These are the medications you received during your hospitalization from 05/26/2017 0945 to 05/26/2017 1556     Date/Time Order Dose Route Action    05/26/2017 1045 ondansetron (ZOFRAN) syringe/vial injection 4 mg 4 mg Intravenous Given    05/26/2017 1045 HYDROmorphone (DILAUDID) injection 1 mg 1 mg Intravenous Given    05/26/2017 1412 lorazepam (ATIVAN) injection 1 mg 1 mg Intravenous Given    05/26/2017 1224 HYDROmorphone (DILAUDID) injection 1 mg 1 mg Intravenous Given    05/26/2017 1459 gadodiamide (OMNISCAN) SOLN 10 mL 10 mL Intravenous Given      Follow-up Information     1. Follow up with [facility], Emergency Dept In 1 day.    Specialty:  Emergency Medicine    Why:  As needed, If symptoms worsen    Contact information    [address] [place] [id number] [phone]      Medication Information     Review all of your home medications and newly ordered medications with your primary doctor and/or pharmacist as soon as possible. Follow medication instructions as directed by your doctor and/or pharmacist.     Please keep your complete medication list with you and share with your physician. Update the information when medications are discontinued, doses are changed, or new medications (including over-the-counter products) are added; and carry medication information at all times in the event of emergency situations.               Medication List      START taking these medications        Instructions    Morning Afternoon Evening Bedtime    hydrocodone-acetaminophen 5-325 MG Tabs per tablet   Commonly known as:  NORCO        Take 1-2 Tabs by mouth every four hours as needed.   Dose:  1-2 Tab                        methylPREDNISolone 4 MG Tabs   Commonly known as:  MEDROL        Take 1 Tab by mouth every day.   Dose:  4 mg                          ASK your doctor about these medications        Instructions    Morning Afternoon Evening Bedtime    Glatiramer Acetate 20 MG/ML Sosy   Commonly known as:  COPAXONE        Inject 40 mg as instructed every 3 days.   Dose:  40 mg                             Where to Get Your Medications      You can get these medications from any pharmacy     Bring a paper prescription for each of these medications    - hydrocodone-acetaminophen 5-325 MG Tabs per tablet  - methylPREDNISolone 4 MG Tabs            Procedures and tests performed during your visit     CBC WITH DIFFERENTIAL    COMP METABOLIC PANEL    CREATINE KINASE    CRP QUANTITIVE (NON-CARDIAC)    DX-HIP-COMPLETE - UNILATERAL 2+ RIGHT    ESTIMATED GFR    HCG QUAL SERUM    MR-HIP WITH & W/O RIGHT    SALINE LOCK    WESTERGREN SED RATE        Discharge Instructions       Hip Pain  Your hip is the joint between your upper legs and your lower pelvis. The bones, cartilage, tendons, and muscles of your hip joint perform a lot of work each day supporting your body weight and allowing you to move around.  Hip pain can range from a minor ache to severe pain in one or both of your hips. Pain may be felt on the inside of the hip joint near the groin, or the outside near the buttocks and upper thigh. You may have swelling or stiffness as well.   HOME CARE INSTRUCTIONS   · Take medicines only as directed by your health care provider.  · Apply ice to the injured area:  ¨ Put ice in a plastic bag.  ¨ Place a towel between your skin and the bag.  ¨ Leave the ice on for 15-20 minutes at a time, 3-4 times a day.  · Keep your leg raised (elevated)  when possible to lessen swelling.  · Avoid activities that cause pain.  · Follow specific exercises as directed by your health care provider.  · Sleep with a pillow between your legs on your most comfortable side.  · Record how often you have hip pain, the location of the pain, and what it feels like.  SEEK MEDICAL CARE IF:   · You are unable to put weight on your leg.  · Your hip is red or swollen or very tender to touch.  · Your pain or swelling continues or worsens after 1 week.  · You have increasing difficulty walking.  · You have a fever.  SEEK IMMEDIATE MEDICAL CARE IF:   · You have fallen.  · You have a sudden increase in pain and swelling in your hip.  MAKE SURE YOU:   · Understand these instructions.  · Will watch your condition.  · Will get help right away if you are not doing well or get worse.     This information is not intended to replace advice given to you by your health care provider. Make sure you discuss any questions you have with your health care provider.     Document Released: 06/07/2011 Document Revised: 01/08/2016 Document Reviewed: 08/14/2014  Else"[employer] Interactive Patient Education ©2016 [employer]            Patient Information     Patient Information    Following emergency treatment: all patient requiring follow-up care must return either to a private physician or a clinic if your condition worsens before you are able to obtain further medical attention, please return to the emergency room.     Billing Information    At [facility], we work to make the billing process streamlined for our patients.  Our Representatives are here to answer any questions you may have regarding your hospital bill.  If you have insurance coverage and have supplied your insurance information to us, we will submit a claim to your insurer on your behalf.  Should you have any questions regarding your bill, we can be reached online or by phone as follows:  Online: You are able pay your bills online or live  chat with our representatives about any billing questions you may have. We are here to help Monday - Friday from 8:00am to 7:30pm and 9:00am - 12:00pm on Saturdays.  Please visit [email or web address]  for more information.   Phone:  [phone] or [phone]    Please note that your emergency physician, surgeon, pathologist, radiologist, anesthesiologist, and other specialists are not employed by [facility] and will therefore bill separately for their services.  Please contact them directly for any questions concerning their bills at the numbers below:     Emergency Physician Services:  [phone]  [facility]:  [phone]  [facility]:  [phone]  [facility]:  [phone]    1. Your final bill may vary from the amount quoted upon discharge if all procedures are not complete at that time, or if your doctor has additional procedures of which we are not aware. You will receive an additional bill if you return to the Emergency Department at [facility] for suture removal regardless of the facility of which the sutures were placed.     2. Please arrange for settlement of this account at the emergency registration.    3. All self-pay accounts are due in full at the time of treatment.  If you are unable to meet this obligation then payment is expected within 4-5 days.     4. If you have had radiology studies (CT, X-ray, Ultrasound, MRI), you have received a preliminary result during your emergency department visit. Please contact the radiology department [phone] to receive a copy of your final result. Please discuss the Final result with your primary physician or with the follow up physician provided.     Crisis Hotline:  [place] Crisis Hotline:  8-640-HJDTVYT or 1-869.288.2107  [place] Crisis Hotline:    1-284.836.6134 or 541-853-8532         ED Discharge Follow Up Questions    1. In order to provide you with very good  care, we would like to follow up with a phone call in the next few days.  May we have your permission to contact you?     YES /  NO    2. What is the best phone number to call you? (       )_____-__________    3. What is the best time to call you?      Morning  /  Afternoon  /  Evening                   Patient Signature:  ____________________________________________________________    Date:  ____________________________________________________________

## 2017-05-26 NOTE — DISCHARGE INSTRUCTIONS
Hip Pain  Your hip is the joint between your upper legs and your lower pelvis. The bones, cartilage, tendons, and muscles of your hip joint perform a lot of work each day supporting your body weight and allowing you to move around.  Hip pain can range from a minor ache to severe pain in one or both of your hips. Pain may be felt on the inside of the hip joint near the groin, or the outside near the buttocks and upper thigh. You may have swelling or stiffness as well.   HOME CARE INSTRUCTIONS   · Take medicines only as directed by your health care provider.  · Apply ice to the injured area:  ¨ Put ice in a plastic bag.  ¨ Place a towel between your skin and the bag.  ¨ Leave the ice on for 15-20 minutes at a time, 3-4 times a day.  · Keep your leg raised (elevated) when possible to lessen swelling.  · Avoid activities that cause pain.  · Follow specific exercises as directed by your health care provider.  · Sleep with a pillow between your legs on your most comfortable side.  · Record how often you have hip pain, the location of the pain, and what it feels like.  SEEK MEDICAL CARE IF:   · You are unable to put weight on your leg.  · Your hip is red or swollen or very tender to touch.  · Your pain or swelling continues or worsens after 1 week.  · You have increasing difficulty walking.  · You have a fever.  SEEK IMMEDIATE MEDICAL CARE IF:   · You have fallen.  · You have a sudden increase in pain and swelling in your hip.  MAKE SURE YOU:   · Understand these instructions.  · Will watch your condition.  · Will get help right away if you are not doing well or get worse.     This information is not intended to replace advice given to you by your health care provider. Make sure you discuss any questions you have with your health care provider.     Document Released: 06/07/2011 Document Revised: 01/08/2016 Document Reviewed: 08/14/2014  [employer] Interactive Patient Education ©2016 [employer]

## 2017-07-20 ENCOUNTER — HOSPITAL ENCOUNTER (EMERGENCY)
Facility: MEDICAL CENTER | Age: 27
End: 2017-07-20
Attending: EMERGENCY MEDICINE
Payer: MEDICARE

## 2017-07-20 VITALS
TEMPERATURE: 98.8 F | SYSTOLIC BLOOD PRESSURE: 122 MMHG | DIASTOLIC BLOOD PRESSURE: 78 MMHG | RESPIRATION RATE: 16 BRPM | HEIGHT: 62 IN | HEART RATE: 96 BPM | WEIGHT: 96 LBS | BODY MASS INDEX: 17.66 KG/M2 | OXYGEN SATURATION: 100 %

## 2017-07-20 DIAGNOSIS — G35 MS (MULTIPLE SCLEROSIS) (HCC): ICD-10-CM

## 2017-07-20 DIAGNOSIS — F12.90 MARIJUANA USE: ICD-10-CM

## 2017-07-20 DIAGNOSIS — M25.551 RIGHT HIP PAIN: ICD-10-CM

## 2017-07-20 LAB
ANION GAP SERPL CALC-SCNC: 9 MMOL/L (ref 0–11.9)
BASOPHILS # BLD AUTO: 0.7 % (ref 0–1.8)
BASOPHILS # BLD: 0.08 K/UL (ref 0–0.12)
BUN SERPL-MCNC: 10 MG/DL (ref 8–22)
CALCIUM SERPL-MCNC: 10.5 MG/DL (ref 8.5–10.5)
CHLORIDE SERPL-SCNC: 106 MMOL/L (ref 96–112)
CO2 SERPL-SCNC: 23 MMOL/L (ref 20–33)
CREAT SERPL-MCNC: 0.64 MG/DL (ref 0.5–1.4)
CRP SERPL HS-MCNC: 0.02 MG/DL (ref 0–0.75)
EOSINOPHIL # BLD AUTO: 0.27 K/UL (ref 0–0.51)
EOSINOPHIL NFR BLD: 2.5 % (ref 0–6.9)
ERYTHROCYTE [DISTWIDTH] IN BLOOD BY AUTOMATED COUNT: 42.3 FL (ref 35.9–50)
GFR SERPL CREATININE-BSD FRML MDRD: >60 ML/MIN/1.73 M 2
GLUCOSE SERPL-MCNC: 82 MG/DL (ref 65–99)
HCT VFR BLD AUTO: 45.3 % (ref 37–47)
HGB BLD-MCNC: 15.1 G/DL (ref 12–16)
IMM GRANULOCYTES # BLD AUTO: 0.02 K/UL (ref 0–0.11)
IMM GRANULOCYTES NFR BLD AUTO: 0.2 % (ref 0–0.9)
LYMPHOCYTES # BLD AUTO: 3.09 K/UL (ref 1–4.8)
LYMPHOCYTES NFR BLD: 28.2 % (ref 22–41)
MCH RBC QN AUTO: 31.5 PG (ref 27–33)
MCHC RBC AUTO-ENTMCNC: 33.3 G/DL (ref 33.6–35)
MCV RBC AUTO: 94.4 FL (ref 81.4–97.8)
MONOCYTES # BLD AUTO: 0.5 K/UL (ref 0–0.85)
MONOCYTES NFR BLD AUTO: 4.6 % (ref 0–13.4)
NEUTROPHILS # BLD AUTO: 7.01 K/UL (ref 2–7.15)
NEUTROPHILS NFR BLD: 63.8 % (ref 44–72)
NRBC # BLD AUTO: 0 K/UL
NRBC BLD AUTO-RTO: 0 /100 WBC
PLATELET # BLD AUTO: 304 K/UL (ref 164–446)
PMV BLD AUTO: 9.1 FL (ref 9–12.9)
POTASSIUM SERPL-SCNC: 3.9 MMOL/L (ref 3.6–5.5)
RBC # BLD AUTO: 4.8 M/UL (ref 4.2–5.4)
SODIUM SERPL-SCNC: 138 MMOL/L (ref 135–145)
WBC # BLD AUTO: 11 K/UL (ref 4.8–10.8)

## 2017-07-20 PROCEDURE — 700111 HCHG RX REV CODE 636 W/ 250 OVERRIDE (IP): Performed by: EMERGENCY MEDICINE

## 2017-07-20 PROCEDURE — 86140 C-REACTIVE PROTEIN: CPT

## 2017-07-20 PROCEDURE — 80048 BASIC METABOLIC PNL TOTAL CA: CPT

## 2017-07-20 PROCEDURE — 99284 EMERGENCY DEPT VISIT MOD MDM: CPT

## 2017-07-20 PROCEDURE — 96375 TX/PRO/DX INJ NEW DRUG ADDON: CPT

## 2017-07-20 PROCEDURE — 85025 COMPLETE CBC W/AUTO DIFF WBC: CPT

## 2017-07-20 PROCEDURE — 96374 THER/PROPH/DIAG INJ IV PUSH: CPT

## 2017-07-20 RX ORDER — ONDANSETRON 2 MG/ML
4 INJECTION INTRAMUSCULAR; INTRAVENOUS ONCE
Status: COMPLETED | OUTPATIENT
Start: 2017-07-20 | End: 2017-07-20

## 2017-07-20 RX ORDER — KETOROLAC TROMETHAMINE 30 MG/ML
30 INJECTION, SOLUTION INTRAMUSCULAR; INTRAVENOUS ONCE
Status: COMPLETED | OUTPATIENT
Start: 2017-07-20 | End: 2017-07-20

## 2017-07-20 RX ORDER — HYDROCODONE BITARTRATE AND ACETAMINOPHEN 5; 325 MG/1; MG/1
1-2 TABLET ORAL EVERY 4 HOURS PRN
Qty: 20 TAB | Refills: 0 | Status: SHIPPED | OUTPATIENT
Start: 2017-07-20 | End: 2017-10-04

## 2017-07-20 RX ORDER — METHYLPREDNISOLONE 4 MG/1
TABLET ORAL
Qty: 1 KIT | Refills: 0 | Status: SHIPPED | OUTPATIENT
Start: 2017-07-20 | End: 2017-10-04

## 2017-07-20 RX ORDER — ONDANSETRON 2 MG/ML
INJECTION INTRAMUSCULAR; INTRAVENOUS
Status: DISCONTINUED
Start: 2017-07-20 | End: 2017-07-20 | Stop reason: HOSPADM

## 2017-07-20 RX ADMIN — HYDROMORPHONE HYDROCHLORIDE 0.5 MG: 1 INJECTION, SOLUTION INTRAMUSCULAR; INTRAVENOUS; SUBCUTANEOUS at 15:47

## 2017-07-20 RX ADMIN — KETOROLAC TROMETHAMINE 30 MG: 30 INJECTION, SOLUTION INTRAMUSCULAR at 15:47

## 2017-07-20 RX ADMIN — ONDANSETRON 4 MG: 2 INJECTION INTRAMUSCULAR; INTRAVENOUS at 16:30

## 2017-07-20 ASSESSMENT — PAIN SCALES - GENERAL: PAINLEVEL_OUTOF10: 2

## 2017-07-20 NOTE — ED AVS SNAPSHOT
Home Care Instructions                                                                                                                Nanci Mccormack   MRN: [id number]    Department:  [facility], Emergency Dept   Date of Visit:  7/20/2017            [facility], Emergency Dept    [address] [place] [address]    Phone:  [phone]      You were seen by     Rosenda Newton M.D.      Your Diagnosis Was     Right hip pain     M25.551       These are the medications you received during your hospitalization from 07/20/2017 1321 to 07/20/2017 1711     Date/Time Order Dose Route Action    07/20/2017 1547 ketorolac (TORADOL) injection 30 mg 30 mg Intravenous Given    07/20/2017 1547 HYDROmorphone (DILAUDID) injection 0.5 mg 0.5 mg Intravenous Given    07/20/2017 1630 ondansetron (ZOFRAN) syringe/vial injection 4 mg 4 mg Intravenous Given      Follow-up Information     1. Follow up with [facility], Emergency Dept.    Specialty:  Emergency Medicine    Why:  return for worsening pain fevers or other concerns.    Contact information    [address] [place] [id number] [phone]      Medication Information     Review all of your home medications and newly ordered medications with your primary doctor and/or pharmacist as soon as possible. Follow medication instructions as directed by your doctor and/or pharmacist.     Please keep your complete medication list with you and share with your physician. Update the information when medications are discontinued, doses are changed, or new medications (including over-the-counter products) are added; and carry medication information at all times in the event of emergency situations.               Medication List      START taking these medications        Instructions    Morning Afternoon Evening Bedtime    MethylPREDNISolone 4 MG Tbpk   Commonly known as:  MEDROL DOSEPAK        Use as directed                             CONTINUE taking these medications        Instructions    Morning Afternoon Evening Bedtime    hydrocodone-acetaminophen 5-325 MG Tabs per tablet   Commonly known as:  NORCO        Take 1-2 Tabs by mouth every four hours as needed.   Dose:  1-2 Tab                          ASK your doctor about these medications        Instructions    Morning Afternoon Evening Bedtime    Glatiramer Acetate 20 MG/ML Sosy   Commonly known as:  COPAXONE        Inject 40 mg as instructed every 3 days.   Dose:  40 mg                             Where to Get Your Medications      You can get these medications from any pharmacy     Bring a paper prescription for each of these medications    - hydrocodone-acetaminophen 5-325 MG Tabs per tablet  - MethylPREDNISolone 4 MG Tbpk            Procedures and tests performed during your visit     BASIC METABOLIC PANEL    CBC WITH DIFFERENTIAL    CRP QUANTITIVE (NON-CARDIAC)    ESTIMATED GFR        Discharge Instructions       Musculoskeletal Pain  Musculoskeletal pain is muscle and josefina aches and pains. These pains can occur in any part of the body. Your caregiver may treat you without knowing the cause of the pain. They may treat you if blood or urine tests, X-rays, and other tests were normal.   CAUSES  There is often not a definite cause or reason for these pains. These pains may be caused by a type of germ (virus). The discomfort may also come from overuse. Overuse includes working out too hard when your body is not fit. Josefina aches also come from weather changes. Bone is sensitive to atmospheric pressure changes.  HOME CARE INSTRUCTIONS   · Ask when your test results will be ready. Make sure you get your test results.  · Only take over-the-counter or prescription medicines for pain, discomfort, or fever as directed by your caregiver. If you were given medications for your condition, do not drive, operate machinery or power tools, or sign legal documents for 24 hours. Do not drink  alcohol. Do not take sleeping pills or other medications that may interfere with treatment.  · Continue all activities unless the activities cause more pain. When the pain lessens, slowly resume normal activities. Gradually increase the intensity and duration of the activities or exercise.  · During periods of severe pain, bed rest may be helpful. Lay or sit in any position that is comfortable.  · Putting ice on the injured area.  ¨ Put ice in a bag.  ¨ Place a towel between your skin and the bag.  ¨ Leave the ice on for 15 to 20 minutes, 3 to 4 times a day.  · Follow up with your caregiver for continued problems and no reason can be found for the pain. If the pain becomes worse or does not go away, it may be necessary to repeat tests or do additional testing. Your caregiver may need to look further for a possible cause.  SEEK IMMEDIATE MEDICAL CARE IF:  · You have pain that is getting worse and is not relieved by medications.  · You develop chest pain that is associated with shortness or breath, sweating, feeling sick to your stomach (nauseous), or throw up (vomit).  · Your pain becomes localized to the abdomen.  · You develop any new symptoms that seem different or that concern you.  MAKE SURE YOU:   · Understand these instructions.  · Will watch your condition.  · Will get help right away if you are not doing well or get worse.     This information is not intended to replace advice given to you by your health care provider. Make sure you discuss any questions you have with your health care provider.     Document Released: 12/18/2006 Document Revised: 03/11/2013 Document Reviewed: 08/22/2014  Elsevier Interactive Patient Education ©2016 Elsevier Inc.            Patient Information     Patient Information    Following emergency treatment: all patient requiring follow-up care must return either to a private physician or a clinic if your condition worsens before you are able to obtain further medical attention,  please return to the emergency room.     Billing Information    At [facility], we work to make the billing process streamlined for our patients.  Our Representatives are here to answer any questions you may have regarding your hospital bill.  If you have insurance coverage and have supplied your insurance information to us, we will submit a claim to your insurer on your behalf.  Should you have any questions regarding your bill, we can be reached online or by phone as follows:  Online: You are able pay your bills online or live chat with our representatives about any billing questions you may have. We are here to help Monday - Friday from 8:00am to 7:30pm and 9:00am - 12:00pm on Saturdays.  Please visit [email or web address]  for more information.   Phone:  [phone] or [phone]    Please note that your emergency physician, surgeon, pathologist, radiologist, anesthesiologist, and other specialists are not employed by [facility] and will therefore bill separately for their services.  Please contact them directly for any questions concerning their bills at the numbers below:     Emergency Physician Services:  [phone]  [facility]:  [phone]  [facility]:  [phone]  [facility]:  [phone]    1. Your final bill may vary from the amount quoted upon discharge if all procedures are not complete at that time, or if your doctor has additional procedures of which we are not aware. You will receive an additional bill if you return to the Emergency Department at [facility] for suture removal regardless of the facility of which the sutures were placed.     2. Please arrange for settlement of this account at the emergency registration.    3. All self-pay accounts are due in full at the time of treatment.  If you are unable to meet this obligation then payment is expected within 4-5 days.     4. If you have had radiology  studies (CT, X-ray, Ultrasound, MRI), you have received a preliminary result during your emergency department visit. Please contact the radiology department [phone] to receive a copy of your final result. Please discuss the Final result with your primary physician or with the follow up physician provided.     Crisis Hotline:  [place] Crisis Hotline:  1-713-IXVITWW or 1-850.318.6258  [place] Crisis Hotline:    1-725.186.9978 or 735-821-8023         ED Discharge Follow Up Questions    1. In order to provide you with very good care, we would like to follow up with a phone call in the next few days.  May we have your permission to contact you?     YES /  NO    2. What is the best phone number to call you? (       )_____-__________    3. What is the best time to call you?      Morning  /  Afternoon  /  Evening                   Patient Signature:  ____________________________________________________________    Date:  ____________________________________________________________

## 2017-07-20 NOTE — ED NOTES
Pt hx of MS. Pt drove herself to ED, states uses crotches. Pt c/o right hip pain, chronic. Pt denies injury, c/o 10/10 pain.

## 2017-07-20 NOTE — ED AVS SNAPSHOT
[employer] Access Code: [email or web address]  Expires: 7/29/2017  4:10 AM    Your email address is not on file at [facility].  Email Addresses are required for you to sign up for [employer], please contact [phone] to verify your personal information and to provide your email address prior to attempting to register for [employer].    Nanci Mccormack  [address] [place] [address]    [employer]  A secure, online tool to manage your health information     [facility] [facility]’s [employer]® is a secure, online tool that connects you to your personalized health information from the privacy of your home -- day or night - making it very easy for you to manage your healthcare. Once the activation process is completed, you can even access your medical information using the [employer] iwona, which is available for free in the Apple Iwona store or Google Play store.     To learn more about [employer], visit [email or web address]    There are two levels of access available (as shown below):   My Chart Features  [facility] Primary Care Doctor [facility]  Specialists [facility]  Urgent  Care Non-[facility] Primary Care Doctor   Email your healthcare team securely and privately 24/7 X X X    Manage appointments: schedule your next appointment; view details of past/upcoming appointments X      Request prescription refills. X      View recent personal medical records, including lab and immunizations X X X X   View health record, including health history, allergies, medications X X X X   Read reports about your outpatient visits, procedures, consult and ER notes X X X X   See your discharge summary, which is a recap of your hospital and/or ER visit that includes your diagnosis, lab results, and care plan X X  X     How to register for [employer]:  Once your e-mail address has been verified, follow the following steps to sign up for [employer].     1. Go to  [email or web address]  2. Click on the Sign Up Now box, which takes you to the New Member Sign Up page. You will need  to provide the following information:  a. Enter your [employer] Access Code exactly as it appears at the top of this page. (You will not need to use this code after you’ve completed the sign-up process. If you do not sign up before the expiration date, you must request a new code.)   b. Enter your date of birth.   c. Enter your home email address.   d. Click Submit, and follow the next screen’s instructions.  3. Create a [employer] ID. This will be your [employer] login ID and cannot be changed, so think of one that is secure and easy to remember.  4. Create a [employer] password. You can change your password at any time.  5. Enter your Password Reset Question and Answer. This can be used at a later time if you forget your password.   6. Enter your e-mail address. This allows you to receive e-mail notifications when new information is available in [employer].  7. Click Sign Up. You can now view your health information.    For assistance activating your [employer] account, call [phone]

## 2017-07-20 NOTE — ED AVS SNAPSHOT
7/20/2017    Nanci Mccormack  [address] [place] [address]    Dear Nanci:    [facility] wants to ensure your discharge home is safe and you or your loved ones have had all of your questions answered regarding your care after you leave the hospital.    Below is a list of resources and contact information should you have any questions regarding your hospital stay, follow-up instructions, or active medical symptoms.    Questions or Concerns Regarding… Contact   Medical Questions Related to Your Discharge  (7 days a week, 8am-5pm) Contact a Nurse Care Coordinator   [phone]   Medical Questions Not Related to Your Discharge  (24 hours a day / 7 days a week)  Contact the Nurse Health Line   [phone]    Medications or Discharge Instructions Refer to your discharge packet   or contact your [facility] Primary Care Provider   [phone]   Follow-up Appointment(s) Schedule your appointment via [employer]   or contact Scheduling [phone]   Billing Review your statement via [employer]  or contact Billing [phone]   Medical Records Review your records via [employer]   or contact Medical Records [phone]     You may receive a telephone call within two days of discharge. This call is to make certain you understand your discharge instructions and have the opportunity to have any questions answered. You can also easily access your medical information, test results and upcoming appointments via the [employer] free online health management tool. You can learn more and sign up at [employer]. For assistance setting up your [employer] account, please call [phone].    Once again, we want to ensure your discharge home is safe and that you have a clear understanding of any next steps in your care. If you have any questions or concerns, please do not hesitate to contact us, we are here for you. Thank you for choosing [facility] for your healthcare needs.    Sincerely,    Your [facility] Healthcare Team

## 2017-07-20 NOTE — ED PROVIDER NOTES
"ED Provider Note    CHIEF COMPLAINT  Chief Complaint   Patient presents with   • Hip Pain       HPI  Nanci Mccormack is a 27 y.o. female who presents with right hip pain. She says it started hurting this morning she has some element of chronic pain all over and in this right hip. She was here 2 months ago for the same but she says today the pain is worse. She says it \"feels like it out of place but I know it's not\". She denies any fevers or chills. She specifically says she has had infections before and she does not feel like this is an infection. She felt like she lost her balance twice today did not fall on the right hip. The pain is worse when she moves. No weakness. She does have a history of MS.      REVIEW OF SYSTEMS  positive for right hip pain, negative for weakness fevers. All other systems are negative.     PAST MEDICAL HISTORY   has a past medical history of MS (multiple sclerosis) (CMS-[facility]).    SOCIAL HISTORY  Social History     Social History Main Topics   • Smoking status: Never Smoker    • Smokeless tobacco: Not on file   • Alcohol Use: Yes   • Drug Use: Yes     Special: Inhaled      Comment: daily marijuana   • Sexual Activity: Not on file       SURGICAL HISTORY  patient denies any surgical history    CURRENT MEDICATIONS  Home Medications     **Home medications have not yet been reviewed for this encounter**          ALLERGIES  Allergies   Allergen Reactions   • Pcn [Penicillins]      Hives         PHYSICAL EXAM  VITAL SIGNS: /87 mmHg  Pulse 103  Temp(Src) 37.1 °C (98.8 °F)  Resp 16  Ht 1.575 m (5' 2.01\")  Wt 43.545 kg (96 lb)  BMI 17.55 kg/m2  SpO2 100%.  Constitutional: Alert in no apparent distress. Tearful at times  HENT: No signs of trauma, Bilateral external ears normal, Nose normal.   Eyes: Pupils are equal and reactive, Conjunctiva normal, Non-icteric.   Neck: Normal range of motion, No tenderness, Supple, No stridor.   Cardiovascular: Regular rate and rhythm, no murmurs. "   Thorax & Lungs: Normal breath sounds, No respiratory distress, No wheezing, No chest tenderness.   Abdomen: Bowel sounds normal, Soft, No tenderness, No masses, No peritoneal signs.  Skin: Warm, Dry, No erythema, No rash.   Musculoskeletal:  no major deformities noted. Palpation with minimal touch over the right soft tissues of the right hip. No bony abnormality is  Neurologic: Alert,  No focal deficits noted.   Psychiatric: Affect normal, Judgment normal, Mood normal.       DIAGNOSTIC STUDIES / PROCEDURES      LABS  Results for orders placed or performed during the hospital encounter of 07/20/17   CBC WITH DIFFERENTIAL   Result Value Ref Range    WBC 11.0 (H) 4.8 - 10.8 K/uL    RBC 4.80 4.20 - 5.40 M/uL    Hemoglobin 15.1 12.0 - 16.0 g/dL    Hematocrit 45.3 37.0 - 47.0 %    MCV 94.4 81.4 - 97.8 fL    MCH 31.5 27.0 - 33.0 pg    MCHC 33.3 (L) 33.6 - 35.0 g/dL    RDW 42.3 35.9 - 50.0 fL    Platelet Count 304 164 - 446 K/uL    MPV 9.1 9.0 - 12.9 fL    Neutrophils-Polys 63.80 44.00 - 72.00 %    Lymphocytes 28.20 22.00 - 41.00 %    Monocytes 4.60 0.00 - 13.40 %    Eosinophils 2.50 0.00 - 6.90 %    Basophils 0.70 0.00 - 1.80 %    Immature Granulocytes 0.20 0.00 - 0.90 %    Nucleated RBC 0.00 /100 WBC    Neutrophils (Absolute) 7.01 2.00 - 7.15 K/uL    Lymphs (Absolute) 3.09 1.00 - 4.80 K/uL    Monos (Absolute) 0.50 0.00 - 0.85 K/uL    Eos (Absolute) 0.27 0.00 - 0.51 K/uL    Baso (Absolute) 0.08 0.00 - 0.12 K/uL    Immature Granulocytes (abs) 0.02 0.00 - 0.11 K/uL    NRBC (Absolute) 0.00 K/uL   BASIC METABOLIC PANEL   Result Value Ref Range    Sodium 138 135 - 145 mmol/L    Potassium 3.9 3.6 - 5.5 mmol/L    Chloride 106 96 - 112 mmol/L    Co2 23 20 - 33 mmol/L    Glucose 82 65 - 99 mg/dL    Creatinine 0.64 0.50 - 1.40 mg/dL    Calcium 10.5 8.5 - 10.5 mg/dL    Anion Gap 9.0 0.0 - 11.9    Bun 10 8 - 22 mg/dL   CRP QUANTITIVE (NON-CARDIAC)   Result Value Ref Range    Stat C-Reactive Protein 0.02 0.00 - 0.75 mg/dL   ESTIMATED  GFR   Result Value Ref Range    GFR If African American >60 >60 mL/min/1.73 m 2    GFR If Non African American >60 >60 mL/min/1.73 m 2           COURSE & MEDICAL DECISION MAKING  Pertinent Labs & Imaging studies reviewed. (See chart for details)  This is a 27-year-old feel that presents with acute on chronic right hip pain. Prior records indicate the patient was here in May for the same symptoms. At that time she had an MRI and x-ray both of which were negative for infection.    Repeat labs are performed she has a minimally elevated white blood cell count normal CRP. She is afebrile. Again I do not think this is acute infection given the lack of left shift or inflammatory response. She was treated with pain medication. Last time she was here she was given a Medrol Dosepak and pain medications which did improve the pain. She'll b given the same today. She is agreeable to this and will be discharged.e      The patient will return for new or worsening symptoms and is stable at the time of discharge. Patient was given return precautions. Patient and/or family member verbalizes understanding and will comply.    DISPOSITION:  Patient will be discharged home in stable condition.    FOLLOW UP:  [facility], Emergency Dept  [address] [facility] [address] [phone]    return for worsening pain fevers or other concerns.      OUTPATIENT MEDICATIONS:  Discharge Medication List as of 7/20/2017  5:11 PM      START taking these medications    Details   MethylPREDNISolone (MEDROL DOSEPAK) 4 MG Tablet Therapy Pack Use as directed, Disp-1 Kit, R-0, Print Rx Paper                   FINAL IMPRESSION  1. Right hip pain    2. MS (multiple sclerosis) (CMS-[facility])    3. Marijuana use          This dictation has been creating using voice recognition software. The accuracy of the dictation is limited the abilities of the software.  I expect there may be some errors of grammar and possibly content. I made every  attempt to manually correct the errors within my dictation. However errors related to this voice recognition software may still exist and should be interpreted within the appropriate context.      The note accurately reflects work and decisions made by me.  Rosenda Newton  7/20/2017  8:09 PM

## 2017-07-21 NOTE — DISCHARGE INSTRUCTIONS
Musculoskeletal Pain  Musculoskeletal pain is muscle and josefina aches and pains. These pains can occur in any part of the body. Your caregiver may treat you without knowing the cause of the pain. They may treat you if blood or urine tests, X-rays, and other tests were normal.   CAUSES  There is often not a definite cause or reason for these pains. These pains may be caused by a type of germ (virus). The discomfort may also come from overuse. Overuse includes working out too hard when your body is not fit. Josefina aches also come from weather changes. Bone is sensitive to atmospheric pressure changes.  HOME CARE INSTRUCTIONS   · Ask when your test results will be ready. Make sure you get your test results.  · Only take over-the-counter or prescription medicines for pain, discomfort, or fever as directed by your caregiver. If you were given medications for your condition, do not drive, operate machinery or power tools, or sign legal documents for 24 hours. Do not drink alcohol. Do not take sleeping pills or other medications that may interfere with treatment.  · Continue all activities unless the activities cause more pain. When the pain lessens, slowly resume normal activities. Gradually increase the intensity and duration of the activities or exercise.  · During periods of severe pain, bed rest may be helpful. Lay or sit in any position that is comfortable.  · Putting ice on the injured area.  ¨ Put ice in a bag.  ¨ Place a towel between your skin and the bag.  ¨ Leave the ice on for 15 to 20 minutes, 3 to 4 times a day.  · Follow up with your caregiver for continued problems and no reason can be found for the pain. If the pain becomes worse or does not go away, it may be necessary to repeat tests or do additional testing. Your caregiver may need to look further for a possible cause.  SEEK IMMEDIATE MEDICAL CARE IF:  · You have pain that is getting worse and is not relieved by medications.  · You develop chest pain  that is associated with shortness or breath, sweating, feeling sick to your stomach (nauseous), or throw up (vomit).  · Your pain becomes localized to the abdomen.  · You develop any new symptoms that seem different or that concern you.  MAKE SURE YOU:   · Understand these instructions.  · Will watch your condition.  · Will get help right away if you are not doing well or get worse.     This information is not intended to replace advice given to you by your health care provider. Make sure you discuss any questions you have with your health care provider.     Document Released: 12/18/2006 Document Revised: 03/11/2013 Document Reviewed: 08/22/2014  [employer] Interactive Patient Education ©2016 Elsevier Inc.

## 2017-10-04 ENCOUNTER — RESOLUTE PROFESSIONAL BILLING HOSPITAL PROF FEE (OUTPATIENT)
Dept: HOSPITALIST | Facility: MEDICAL CENTER | Age: 27
End: 2017-10-04
Payer: MEDICARE

## 2017-10-04 ENCOUNTER — APPOINTMENT (OUTPATIENT)
Dept: RADIOLOGY | Facility: MEDICAL CENTER | Age: 27
DRG: 060 | End: 2017-10-04
Attending: EMERGENCY MEDICINE
Payer: MEDICARE

## 2017-10-04 ENCOUNTER — HOSPITAL ENCOUNTER (INPATIENT)
Facility: MEDICAL CENTER | Age: 27
LOS: 3 days | DRG: 060 | End: 2017-10-09
Attending: EMERGENCY MEDICINE | Admitting: INTERNAL MEDICINE
Payer: MEDICARE

## 2017-10-04 PROBLEM — G35 MULTIPLE SCLEROSIS EXACERBATION (HCC): Status: ACTIVE | Noted: 2017-10-04

## 2017-10-04 PROBLEM — G89.4 CHRONIC PAIN SYNDROME: Status: ACTIVE | Noted: 2017-10-04

## 2017-10-04 LAB
ALBUMIN SERPL BCP-MCNC: 4.1 G/DL (ref 3.2–4.9)
ALBUMIN/GLOB SERPL: 1.5 G/DL
ALP SERPL-CCNC: 53 U/L (ref 30–99)
ALT SERPL-CCNC: 8 U/L (ref 2–50)
ANION GAP SERPL CALC-SCNC: 8 MMOL/L (ref 0–11.9)
APPEARANCE UR: CLEAR
APTT PPP: 24.6 SEC (ref 24.7–36)
AST SERPL-CCNC: 15 U/L (ref 12–45)
BASOPHILS # BLD AUTO: 0.6 % (ref 0–1.8)
BASOPHILS # BLD: 0.05 K/UL (ref 0–0.12)
BILIRUB SERPL-MCNC: 0.3 MG/DL (ref 0.1–1.5)
BILIRUB UR QL STRIP.AUTO: NEGATIVE
BUN SERPL-MCNC: 12 MG/DL (ref 8–22)
CALCIUM SERPL-MCNC: 9.4 MG/DL (ref 8.5–10.5)
CHLORIDE SERPL-SCNC: 107 MMOL/L (ref 96–112)
CO2 SERPL-SCNC: 22 MMOL/L (ref 20–33)
COLOR UR: YELLOW
CREAT SERPL-MCNC: 0.73 MG/DL (ref 0.5–1.4)
CULTURE IF INDICATED INDCX: NO UA CULTURE
EOSINOPHIL # BLD AUTO: 0.4 K/UL (ref 0–0.51)
EOSINOPHIL NFR BLD: 4.9 % (ref 0–6.9)
ERYTHROCYTE [DISTWIDTH] IN BLOOD BY AUTOMATED COUNT: 45.5 FL (ref 35.9–50)
EST. AVERAGE GLUCOSE BLD GHB EST-MCNC: 108 MG/DL
GFR SERPL CREATININE-BSD FRML MDRD: >60 ML/MIN/1.73 M 2
GLOBULIN SER CALC-MCNC: 2.7 G/DL (ref 1.9–3.5)
GLUCOSE SERPL-MCNC: 98 MG/DL (ref 65–99)
GLUCOSE UR STRIP.AUTO-MCNC: NEGATIVE MG/DL
HBA1C MFR BLD: 5.4 % (ref 0–5.6)
HCG SERPL QL: NEGATIVE
HCT VFR BLD AUTO: 41.4 % (ref 37–47)
HGB BLD-MCNC: 13.7 G/DL (ref 12–16)
IMM GRANULOCYTES # BLD AUTO: 0.01 K/UL (ref 0–0.11)
IMM GRANULOCYTES NFR BLD AUTO: 0.1 % (ref 0–0.9)
INR PPP: 1.09 (ref 0.87–1.13)
KETONES UR STRIP.AUTO-MCNC: NEGATIVE MG/DL
LACTATE BLD-SCNC: 1.4 MMOL/L (ref 0.5–2)
LEUKOCYTE ESTERASE UR QL STRIP.AUTO: NEGATIVE
LYMPHOCYTES # BLD AUTO: 3.41 K/UL (ref 1–4.8)
LYMPHOCYTES NFR BLD: 42 % (ref 22–41)
MCH RBC QN AUTO: 31.6 PG (ref 27–33)
MCHC RBC AUTO-ENTMCNC: 33.1 G/DL (ref 33.6–35)
MCV RBC AUTO: 95.6 FL (ref 81.4–97.8)
MICRO URNS: NORMAL
MONOCYTES # BLD AUTO: 0.32 K/UL (ref 0–0.85)
MONOCYTES NFR BLD AUTO: 3.9 % (ref 0–13.4)
NEUTROPHILS # BLD AUTO: 3.92 K/UL (ref 2–7.15)
NEUTROPHILS NFR BLD: 48.5 % (ref 44–72)
NITRITE UR QL STRIP.AUTO: NEGATIVE
NRBC # BLD AUTO: 0 K/UL
NRBC BLD AUTO-RTO: 0 /100 WBC
PH UR STRIP.AUTO: 6 [PH]
PLATELET # BLD AUTO: 236 K/UL (ref 164–446)
PMV BLD AUTO: 9.4 FL (ref 9–12.9)
POTASSIUM SERPL-SCNC: 4.1 MMOL/L (ref 3.6–5.5)
PROT SERPL-MCNC: 6.8 G/DL (ref 6–8.2)
PROT UR QL STRIP: NEGATIVE MG/DL
PROTHROMBIN TIME: 14.5 SEC (ref 12–14.6)
RBC # BLD AUTO: 4.33 M/UL (ref 4.2–5.4)
RBC UR QL AUTO: NEGATIVE
SODIUM SERPL-SCNC: 137 MMOL/L (ref 135–145)
SP GR UR STRIP.AUTO: 1.02
TROPONIN I SERPL-MCNC: <0.01 NG/ML (ref 0–0.04)
UROBILINOGEN UR STRIP.AUTO-MCNC: 0.2 MG/DL
WBC # BLD AUTO: 8.1 K/UL (ref 4.8–10.8)

## 2017-10-04 PROCEDURE — 700111 HCHG RX REV CODE 636 W/ 250 OVERRIDE (IP): Performed by: FAMILY MEDICINE

## 2017-10-04 PROCEDURE — 85610 PROTHROMBIN TIME: CPT

## 2017-10-04 PROCEDURE — 84484 ASSAY OF TROPONIN QUANT: CPT

## 2017-10-04 PROCEDURE — 85730 THROMBOPLASTIN TIME PARTIAL: CPT

## 2017-10-04 PROCEDURE — 302128 INFUSION PUMP W/POLE: Performed by: INTERNAL MEDICINE

## 2017-10-04 PROCEDURE — A9270 NON-COVERED ITEM OR SERVICE: HCPCS | Performed by: INTERNAL MEDICINE

## 2017-10-04 PROCEDURE — 85025 COMPLETE CBC W/AUTO DIFF WBC: CPT

## 2017-10-04 PROCEDURE — 80053 COMPREHEN METABOLIC PANEL: CPT

## 2017-10-04 PROCEDURE — 96374 THER/PROPH/DIAG INJ IV PUSH: CPT

## 2017-10-04 PROCEDURE — 99220 PR INITIAL OBSERVATION CARE,LEVL III: CPT | Performed by: INTERNAL MEDICINE

## 2017-10-04 PROCEDURE — 700105 HCHG RX REV CODE 258: Performed by: INTERNAL MEDICINE

## 2017-10-04 PROCEDURE — 700105 HCHG RX REV CODE 258: Performed by: EMERGENCY MEDICINE

## 2017-10-04 PROCEDURE — 700111 HCHG RX REV CODE 636 W/ 250 OVERRIDE (IP): Performed by: EMERGENCY MEDICINE

## 2017-10-04 PROCEDURE — 99285 EMERGENCY DEPT VISIT HI MDM: CPT

## 2017-10-04 PROCEDURE — 94760 N-INVAS EAR/PLS OXIMETRY 1: CPT

## 2017-10-04 PROCEDURE — 84703 CHORIONIC GONADOTROPIN ASSAY: CPT

## 2017-10-04 PROCEDURE — 36415 COLL VENOUS BLD VENIPUNCTURE: CPT

## 2017-10-04 PROCEDURE — G0378 HOSPITAL OBSERVATION PER HR: HCPCS

## 2017-10-04 PROCEDURE — 81003 URINALYSIS AUTO W/O SCOPE: CPT

## 2017-10-04 PROCEDURE — 70450 CT HEAD/BRAIN W/O DYE: CPT

## 2017-10-04 PROCEDURE — 96376 TX/PRO/DX INJ SAME DRUG ADON: CPT

## 2017-10-04 PROCEDURE — 71010 DX-CHEST-PORTABLE (1 VIEW): CPT

## 2017-10-04 PROCEDURE — 83605 ASSAY OF LACTIC ACID: CPT

## 2017-10-04 PROCEDURE — 83036 HEMOGLOBIN GLYCOSYLATED A1C: CPT

## 2017-10-04 PROCEDURE — 96375 TX/PRO/DX INJ NEW DRUG ADDON: CPT

## 2017-10-04 PROCEDURE — 700102 HCHG RX REV CODE 250 W/ 637 OVERRIDE(OP): Performed by: INTERNAL MEDICINE

## 2017-10-04 RX ORDER — MORPHINE SULFATE 4 MG/ML
4 INJECTION, SOLUTION INTRAMUSCULAR; INTRAVENOUS ONCE
Status: COMPLETED | OUTPATIENT
Start: 2017-10-04 | End: 2017-10-04

## 2017-10-04 RX ORDER — POLYETHYLENE GLYCOL 3350 17 G/17G
1 POWDER, FOR SOLUTION ORAL
Status: DISCONTINUED | OUTPATIENT
Start: 2017-10-04 | End: 2017-10-09 | Stop reason: HOSPADM

## 2017-10-04 RX ORDER — HYDROCODONE BITARTRATE AND ACETAMINOPHEN 5; 325 MG/1; MG/1
1 TABLET ORAL EVERY 8 HOURS PRN
Status: ON HOLD | COMMUNITY
End: 2017-10-09

## 2017-10-04 RX ORDER — OXYCODONE HYDROCHLORIDE 10 MG/1
10 TABLET ORAL
Status: DISCONTINUED | OUTPATIENT
Start: 2017-10-04 | End: 2017-10-09 | Stop reason: HOSPADM

## 2017-10-04 RX ORDER — SODIUM CHLORIDE 9 MG/ML
INJECTION, SOLUTION INTRAVENOUS CONTINUOUS
Status: DISCONTINUED | OUTPATIENT
Start: 2017-10-04 | End: 2017-10-04

## 2017-10-04 RX ORDER — ASPIRIN 300 MG/1
300 SUPPOSITORY RECTAL EVERY EVENING
Status: DISCONTINUED | OUTPATIENT
Start: 2017-10-04 | End: 2017-10-07

## 2017-10-04 RX ORDER — SODIUM CHLORIDE 9 MG/ML
INJECTION, SOLUTION INTRAVENOUS CONTINUOUS
Status: DISCONTINUED | OUTPATIENT
Start: 2017-10-04 | End: 2017-10-09 | Stop reason: HOSPADM

## 2017-10-04 RX ORDER — ASPIRIN 81 MG/1
324 TABLET, CHEWABLE ORAL EVERY EVENING
Status: DISCONTINUED | OUTPATIENT
Start: 2017-10-04 | End: 2017-10-07

## 2017-10-04 RX ORDER — ACETAMINOPHEN 325 MG/1
650 TABLET ORAL EVERY 6 HOURS PRN
Status: DISCONTINUED | OUTPATIENT
Start: 2017-10-04 | End: 2017-10-09 | Stop reason: HOSPADM

## 2017-10-04 RX ORDER — ONDANSETRON 2 MG/ML
4 INJECTION INTRAMUSCULAR; INTRAVENOUS EVERY 6 HOURS PRN
Status: DISCONTINUED | OUTPATIENT
Start: 2017-10-04 | End: 2017-10-05

## 2017-10-04 RX ORDER — ASPIRIN 325 MG
325 TABLET ORAL EVERY EVENING
Status: DISCONTINUED | OUTPATIENT
Start: 2017-10-04 | End: 2017-10-07

## 2017-10-04 RX ORDER — SODIUM CHLORIDE 9 MG/ML
1000 INJECTION, SOLUTION INTRAVENOUS ONCE
Status: COMPLETED | OUTPATIENT
Start: 2017-10-04 | End: 2017-10-04

## 2017-10-04 RX ORDER — BISACODYL 10 MG
10 SUPPOSITORY, RECTAL RECTAL
Status: DISCONTINUED | OUTPATIENT
Start: 2017-10-04 | End: 2017-10-09 | Stop reason: HOSPADM

## 2017-10-04 RX ORDER — KETOROLAC TROMETHAMINE 30 MG/ML
30 INJECTION, SOLUTION INTRAMUSCULAR; INTRAVENOUS ONCE
Status: COMPLETED | OUTPATIENT
Start: 2017-10-04 | End: 2017-10-04

## 2017-10-04 RX ORDER — ONDANSETRON 2 MG/ML
4 INJECTION INTRAMUSCULAR; INTRAVENOUS ONCE
Status: COMPLETED | OUTPATIENT
Start: 2017-10-04 | End: 2017-10-04

## 2017-10-04 RX ORDER — GLATIRAMER ACETATE 20 MG/ML
40 INJECTION, SOLUTION SUBCUTANEOUS
Status: DISCONTINUED | OUTPATIENT
Start: 2017-10-04 | End: 2017-10-04

## 2017-10-04 RX ORDER — OXYCODONE HYDROCHLORIDE 5 MG/1
5 TABLET ORAL
Status: DISCONTINUED | OUTPATIENT
Start: 2017-10-04 | End: 2017-10-09 | Stop reason: HOSPADM

## 2017-10-04 RX ORDER — AMOXICILLIN 250 MG
2 CAPSULE ORAL 2 TIMES DAILY
Status: DISCONTINUED | OUTPATIENT
Start: 2017-10-05 | End: 2017-10-09 | Stop reason: HOSPADM

## 2017-10-04 RX ORDER — DIPHENHYDRAMINE HCL 25 MG
25 TABLET ORAL EVERY 6 HOURS PRN
Status: DISCONTINUED | OUTPATIENT
Start: 2017-10-04 | End: 2017-10-05 | Stop reason: DRUGHIGH

## 2017-10-04 RX ORDER — DIPHENHYDRAMINE HYDROCHLORIDE 50 MG/ML
12.5-25 INJECTION INTRAMUSCULAR; INTRAVENOUS EVERY 6 HOURS PRN
Status: DISCONTINUED | OUTPATIENT
Start: 2017-10-04 | End: 2017-10-05 | Stop reason: DRUGHIGH

## 2017-10-04 RX ORDER — GLATIRAMER ACETATE 20 MG/ML
20 INJECTION, SOLUTION SUBCUTANEOUS DAILY
COMMUNITY
End: 2018-12-02

## 2017-10-04 RX ADMIN — SODIUM CHLORIDE: 9 INJECTION, SOLUTION INTRAVENOUS at 22:25

## 2017-10-04 RX ADMIN — ASPIRIN 325 MG: 325 TABLET, COATED ORAL at 21:56

## 2017-10-04 RX ADMIN — SODIUM CHLORIDE 1000 ML: 9 INJECTION, SOLUTION INTRAVENOUS at 17:16

## 2017-10-04 RX ADMIN — ONDANSETRON 4 MG: 2 INJECTION INTRAMUSCULAR; INTRAVENOUS at 17:15

## 2017-10-04 RX ADMIN — OXYCODONE HYDROCHLORIDE 10 MG: 10 TABLET ORAL at 21:03

## 2017-10-04 RX ADMIN — ONDANSETRON 4 MG: 2 INJECTION INTRAMUSCULAR; INTRAVENOUS at 22:25

## 2017-10-04 RX ADMIN — KETOROLAC TROMETHAMINE 30 MG: 30 INJECTION, SOLUTION INTRAMUSCULAR at 19:11

## 2017-10-04 RX ADMIN — MORPHINE SULFATE 4 MG: 4 INJECTION INTRAVENOUS at 17:14

## 2017-10-04 ASSESSMENT — ENCOUNTER SYMPTOMS
EYE PAIN: 0
SENSORY CHANGE: 1
NAUSEA: 1
FEVER: 0
TINGLING: 1
LOSS OF CONSCIOUSNESS: 0
SEIZURES: 0
HEADACHES: 1
EYE REDNESS: 0
DIZZINESS: 0
BLURRED VISION: 1
FOCAL WEAKNESS: 0
DIARRHEA: 0
NERVOUS/ANXIOUS: 0
COUGH: 0
MYALGIAS: 1
CONSTIPATION: 0
INSOMNIA: 0
FALLS: 0
WHEEZING: 0
SHORTNESS OF BREATH: 0
WEAKNESS: 0
BLOOD IN STOOL: 0
PALPITATIONS: 0
ABDOMINAL PAIN: 0
VOMITING: 1
CHILLS: 0
HEMOPTYSIS: 0
TREMORS: 0

## 2017-10-04 ASSESSMENT — LIFESTYLE VARIABLES
DO YOU DRINK ALCOHOL: NO
EVER_SMOKED: NEVER

## 2017-10-04 ASSESSMENT — PATIENT HEALTH QUESTIONNAIRE - PHQ9
SUM OF ALL RESPONSES TO PHQ9 QUESTIONS 1 AND 2: 0
1. LITTLE INTEREST OR PLEASURE IN DOING THINGS: NOT AT ALL
2. FEELING DOWN, DEPRESSED, IRRITABLE, OR HOPELESS: NOT AT ALL
SUM OF ALL RESPONSES TO PHQ QUESTIONS 1-9: 0

## 2017-10-04 ASSESSMENT — PAIN SCALES - GENERAL
PAINLEVEL_OUTOF10: 7
PAINLEVEL_OUTOF10: 6

## 2017-10-04 NOTE — ED NOTES
"Nanci Mccormack  Chief Complaint   Patient presents with   • Head Ache     to occiput x 2 days,  denies any injury or trauma to head.     • N/V     x 2 days   • Visual Problems     \"floaters and spots\" in (R) eye since this am.      Pt ambulatory to triage with above complaint.  VSS.  Hx of MS.   Pt returned to lobby, educated on triage process, and to inform staff of any changes or concerns.     "

## 2017-10-05 ENCOUNTER — APPOINTMENT (OUTPATIENT)
Dept: RADIOLOGY | Facility: MEDICAL CENTER | Age: 27
DRG: 060 | End: 2017-10-05
Attending: PSYCHIATRY & NEUROLOGY
Payer: MEDICARE

## 2017-10-05 LAB
ANION GAP SERPL CALC-SCNC: 4 MMOL/L (ref 0–11.9)
BUN SERPL-MCNC: 10 MG/DL (ref 8–22)
CALCIUM SERPL-MCNC: 8.3 MG/DL (ref 8.5–10.5)
CHLORIDE SERPL-SCNC: 112 MMOL/L (ref 96–112)
CHOLEST SERPL-MCNC: 90 MG/DL (ref 100–199)
CO2 SERPL-SCNC: 22 MMOL/L (ref 20–33)
CREAT SERPL-MCNC: 0.59 MG/DL (ref 0.5–1.4)
ERYTHROCYTE [DISTWIDTH] IN BLOOD BY AUTOMATED COUNT: 46.4 FL (ref 35.9–50)
GFR SERPL CREATININE-BSD FRML MDRD: >60 ML/MIN/1.73 M 2
GLUCOSE SERPL-MCNC: 97 MG/DL (ref 65–99)
HCT VFR BLD AUTO: 37.8 % (ref 37–47)
HDLC SERPL-MCNC: 46 MG/DL
HGB BLD-MCNC: 12 G/DL (ref 12–16)
LDLC SERPL CALC-MCNC: 36 MG/DL
MCH RBC QN AUTO: 31.3 PG (ref 27–33)
MCHC RBC AUTO-ENTMCNC: 31.7 G/DL (ref 33.6–35)
MCV RBC AUTO: 98.7 FL (ref 81.4–97.8)
PLATELET # BLD AUTO: 212 K/UL (ref 164–446)
PMV BLD AUTO: 9.6 FL (ref 9–12.9)
POTASSIUM SERPL-SCNC: 4.1 MMOL/L (ref 3.6–5.5)
RBC # BLD AUTO: 3.83 M/UL (ref 4.2–5.4)
SODIUM SERPL-SCNC: 138 MMOL/L (ref 135–145)
TRIGL SERPL-MCNC: 41 MG/DL (ref 0–149)
WBC # BLD AUTO: 8.2 K/UL (ref 4.8–10.8)

## 2017-10-05 PROCEDURE — A9579 GAD-BASE MR CONTRAST NOS,1ML: HCPCS | Performed by: PSYCHIATRY & NEUROLOGY

## 2017-10-05 PROCEDURE — 700111 HCHG RX REV CODE 636 W/ 250 OVERRIDE (IP): Performed by: PSYCHIATRY & NEUROLOGY

## 2017-10-05 PROCEDURE — 700105 HCHG RX REV CODE 258: Performed by: PSYCHIATRY & NEUROLOGY

## 2017-10-05 PROCEDURE — 96375 TX/PRO/DX INJ NEW DRUG ADDON: CPT

## 2017-10-05 PROCEDURE — 93880 EXTRACRANIAL BILAT STUDY: CPT

## 2017-10-05 PROCEDURE — 96376 TX/PRO/DX INJ SAME DRUG ADON: CPT

## 2017-10-05 PROCEDURE — 700117 HCHG RX CONTRAST REV CODE 255: Performed by: PSYCHIATRY & NEUROLOGY

## 2017-10-05 PROCEDURE — 36415 COLL VENOUS BLD VENIPUNCTURE: CPT

## 2017-10-05 PROCEDURE — 80048 BASIC METABOLIC PNL TOTAL CA: CPT

## 2017-10-05 PROCEDURE — 700102 HCHG RX REV CODE 250 W/ 637 OVERRIDE(OP): Performed by: INTERNAL MEDICINE

## 2017-10-05 PROCEDURE — 80061 LIPID PANEL: CPT

## 2017-10-05 PROCEDURE — G0378 HOSPITAL OBSERVATION PER HR: HCPCS

## 2017-10-05 PROCEDURE — 700105 HCHG RX REV CODE 258: Performed by: INTERNAL MEDICINE

## 2017-10-05 PROCEDURE — 70553 MRI BRAIN STEM W/O & W/DYE: CPT

## 2017-10-05 PROCEDURE — 700111 HCHG RX REV CODE 636 W/ 250 OVERRIDE (IP): Performed by: INTERNAL MEDICINE

## 2017-10-05 PROCEDURE — 93880 EXTRACRANIAL BILAT STUDY: CPT | Mod: 26 | Performed by: SURGERY

## 2017-10-05 PROCEDURE — 99226 PR SUBSEQUENT OBSERVATION CARE,LEVEL III: CPT | Performed by: HOSPITALIST

## 2017-10-05 PROCEDURE — A9270 NON-COVERED ITEM OR SERVICE: HCPCS | Performed by: INTERNAL MEDICINE

## 2017-10-05 PROCEDURE — 700111 HCHG RX REV CODE 636 W/ 250 OVERRIDE (IP): Performed by: FAMILY MEDICINE

## 2017-10-05 PROCEDURE — 700111 HCHG RX REV CODE 636 W/ 250 OVERRIDE (IP): Performed by: HOSPITALIST

## 2017-10-05 PROCEDURE — 85027 COMPLETE CBC AUTOMATED: CPT

## 2017-10-05 RX ORDER — ONDANSETRON 2 MG/ML
4 INJECTION INTRAMUSCULAR; INTRAVENOUS EVERY 4 HOURS PRN
Status: DISCONTINUED | OUTPATIENT
Start: 2017-10-05 | End: 2017-10-09 | Stop reason: HOSPADM

## 2017-10-05 RX ORDER — MORPHINE SULFATE 4 MG/ML
2 INJECTION, SOLUTION INTRAMUSCULAR; INTRAVENOUS ONCE
Status: COMPLETED | OUTPATIENT
Start: 2017-10-05 | End: 2017-10-05

## 2017-10-05 RX ORDER — KETOROLAC TROMETHAMINE 30 MG/ML
60 INJECTION, SOLUTION INTRAMUSCULAR; INTRAVENOUS EVERY 6 HOURS PRN
Status: DISCONTINUED | OUTPATIENT
Start: 2017-10-05 | End: 2017-10-09 | Stop reason: HOSPADM

## 2017-10-05 RX ORDER — METOCLOPRAMIDE HYDROCHLORIDE 5 MG/ML
20 INJECTION INTRAMUSCULAR; INTRAVENOUS EVERY 6 HOURS PRN
Status: DISCONTINUED | OUTPATIENT
Start: 2017-10-05 | End: 2017-10-09 | Stop reason: HOSPADM

## 2017-10-05 RX ORDER — LORAZEPAM 2 MG/ML
1 INJECTION INTRAMUSCULAR ONCE
Status: DISCONTINUED | OUTPATIENT
Start: 2017-10-05 | End: 2017-10-05 | Stop reason: SDUPTHER

## 2017-10-05 RX ORDER — DIPHENHYDRAMINE HYDROCHLORIDE 50 MG/ML
25 INJECTION INTRAMUSCULAR; INTRAVENOUS EVERY 6 HOURS PRN
Status: DISCONTINUED | OUTPATIENT
Start: 2017-10-05 | End: 2017-10-09 | Stop reason: HOSPADM

## 2017-10-05 RX ORDER — LORAZEPAM 2 MG/ML
1 INJECTION INTRAMUSCULAR
Status: COMPLETED | OUTPATIENT
Start: 2017-10-05 | End: 2017-10-05

## 2017-10-05 RX ADMIN — OXYCODONE HYDROCHLORIDE 10 MG: 10 TABLET ORAL at 00:53

## 2017-10-05 RX ADMIN — HYDROMORPHONE HYDROCHLORIDE 0.5 MG: 1 INJECTION, SOLUTION INTRAMUSCULAR; INTRAVENOUS; SUBCUTANEOUS at 09:25

## 2017-10-05 RX ADMIN — HYDROMORPHONE HYDROCHLORIDE 0.5 MG: 1 INJECTION, SOLUTION INTRAMUSCULAR; INTRAVENOUS; SUBCUTANEOUS at 12:28

## 2017-10-05 RX ADMIN — SODIUM CHLORIDE: 9 INJECTION, SOLUTION INTRAVENOUS at 18:59

## 2017-10-05 RX ADMIN — SODIUM CHLORIDE 1000 MG: 9 INJECTION, SOLUTION INTRAVENOUS at 22:15

## 2017-10-05 RX ADMIN — GADODIAMIDE 10 ML: 287 INJECTION INTRAVENOUS at 17:44

## 2017-10-05 RX ADMIN — ONDANSETRON 4 MG: 2 INJECTION INTRAMUSCULAR; INTRAVENOUS at 06:59

## 2017-10-05 RX ADMIN — MORPHINE SULFATE 2 MG: 4 INJECTION INTRAVENOUS at 21:42

## 2017-10-05 RX ADMIN — DIPHENHYDRAMINE HYDROCHLORIDE 25 MG: 50 INJECTION, SOLUTION INTRAMUSCULAR; INTRAVENOUS at 23:41

## 2017-10-05 RX ADMIN — KETOROLAC TROMETHAMINE 60 MG: 30 INJECTION, SOLUTION INTRAMUSCULAR at 16:38

## 2017-10-05 RX ADMIN — ONDANSETRON 4 MG: 2 INJECTION INTRAMUSCULAR; INTRAVENOUS at 12:28

## 2017-10-05 RX ADMIN — KETOROLAC TROMETHAMINE 60 MG: 30 INJECTION, SOLUTION INTRAMUSCULAR at 23:34

## 2017-10-05 RX ADMIN — ACETAMINOPHEN 650 MG: 325 TABLET, FILM COATED ORAL at 06:19

## 2017-10-05 RX ADMIN — ACETAMINOPHEN 650 MG: 325 TABLET, FILM COATED ORAL at 00:53

## 2017-10-05 RX ADMIN — LORAZEPAM 1 MG: 2 INJECTION INTRAMUSCULAR; INTRAVENOUS at 17:12

## 2017-10-05 RX ADMIN — ONDANSETRON 4 MG: 2 INJECTION INTRAMUSCULAR; INTRAVENOUS at 16:50

## 2017-10-05 RX ADMIN — OXYCODONE HYDROCHLORIDE 10 MG: 10 TABLET ORAL at 06:19

## 2017-10-05 ASSESSMENT — PAIN SCALES - GENERAL
PAINLEVEL_OUTOF10: 5
PAINLEVEL_OUTOF10: 7
PAINLEVEL_OUTOF10: 7
PAINLEVEL_OUTOF10: 6

## 2017-10-05 ASSESSMENT — ENCOUNTER SYMPTOMS
VOMITING: 0
PHOTOPHOBIA: 0
MYALGIAS: 1
SORE THROAT: 0
NECK PAIN: 0
HEARTBURN: 0
SENSORY CHANGE: 1
STRIDOR: 0
CLAUDICATION: 0
COUGH: 0
CONSTIPATION: 0
HEMOPTYSIS: 0
DOUBLE VISION: 0
BLOOD IN STOOL: 0
CHILLS: 0
EYE PAIN: 0
SPEECH CHANGE: 0
TREMORS: 0
DIZZINESS: 0
SPUTUM PRODUCTION: 0
NERVOUS/ANXIOUS: 0
WEAKNESS: 1
BLURRED VISION: 0
PND: 0
DEPRESSION: 0
MEMORY LOSS: 0
ORTHOPNEA: 0
TINGLING: 1
NAUSEA: 0
FEVER: 0
SHORTNESS OF BREATH: 0
PALPITATIONS: 0
BACK PAIN: 0
HEADACHES: 1

## 2017-10-05 ASSESSMENT — LIFESTYLE VARIABLES
EVER HAD A DRINK FIRST THING IN THE MORNING TO STEADY YOUR NERVES TO GET RID OF A HANGOVER: NO
EVER_SMOKED: NEVER
TOTAL SCORE: 0
HAVE PEOPLE ANNOYED YOU BY CRITICIZING YOUR DRINKING: NO
TOTAL SCORE: 0
DO YOU DRINK ALCOHOL: YES
AVERAGE NUMBER OF DAYS PER WEEK YOU HAVE A DRINK CONTAINING ALCOHOL: 2
CONSUMPTION TOTAL: NEGATIVE
HAVE YOU EVER FELT YOU SHOULD CUT DOWN ON YOUR DRINKING: NO
EVER FELT BAD OR GUILTY ABOUT YOUR DRINKING: NO
HOW MANY TIMES IN THE PAST YEAR HAVE YOU HAD 5 OR MORE DRINKS IN A DAY: 0
TOTAL SCORE: 0
ON A TYPICAL DAY WHEN YOU DRINK ALCOHOL HOW MANY DRINKS DO YOU HAVE: 1

## 2017-10-05 NOTE — ED NOTES
MRI screening form faxed to [phone].  Pt reports claustrophobia. PRN medications ordered by Admitting Zhanna AUGUSTE.

## 2017-10-05 NOTE — ED NOTES
Report given to SHIVANI Simms on CDU.   Pt transported to T215 via [facility] on monitor by RN.

## 2017-10-05 NOTE — PROGRESS NOTES
Pt in bed a&ox4,BP 99/59,will recheck,pt c/o lot of pain,she said she throw up the oral pill,pt still with tingling on her RLE,bp rechecked-113/74 mm of hg.POC explained.

## 2017-10-05 NOTE — THERAPY
Physical Therapy Contact Note    PT eval attempted this morning. Nsg reports patient with nausea/vomiting, states to hold on PT eval this morning. Will f/u as able, likely tomorrow morning.     ADRIANA SepulvedaT

## 2017-10-05 NOTE — PROGRESS NOTES
UA sent. Pt has one small emesis, PRN zofran given per order. Is able to ambulate to restroom with SBA.

## 2017-10-05 NOTE — PROGRESS NOTES
Pt SBA with walker to restroom. C/o pain, PRN pain rx given, warm blanket applied, and pt stretched at bedside.

## 2017-10-05 NOTE — PROGRESS NOTES
Pt c/o pain in back, RLE, and headache. PRN pain rx given. Pt's RLE continues to be numb/tingling and weak.

## 2017-10-05 NOTE — ED PROVIDER NOTES
"ED Provider Note    CHIEF COMPLAINT  Chief Complaint   Patient presents with   • Head Ache     to occiput x 2 days,  denies any injury or trauma to head.     • N/V     x 2 days   • Visual Problems     \"floaters and spots\" in (R) eye since this am.        HPI  Nanci Mccormack is a 27 y.o. female with a history of multiple sclerosis who presents with complaints of nausea, vomiting, headache, difficulty walking, and weakness of right side for the past 2 days. The patient says she developed a headache 2 days ago and has been having nausea and vomiting since. She is not really tolerate much food or fluids. She also complains of having some numbness and tingling to the right side which is new, along with some weakness to her right arm and leg. She has had problems with weakness on the right side with her MS in the past. She saw Dr. Cheung of neurology in April 2016, but has not seen a neurologist since. She also complains of having a headache to the left  back of her head. She describes the pain as constant pain and pressure to the back of her head. She denies any recent fall or trauma. She says with the onset of her headache she also developed some floaters in her right eye, and sees 3 gray spots in her visual field. She denies any eye pain, visual loss, or any flashing lights or color changes in her vision. Patient also complains of a cough for the past week which has been nonproductive. She has had no fever, shaking chills, sore throat, chest pain, back pain, or abdominal pain. She denies any urinary symptoms or diarrhea.     REVIEW OF SYSTEMS  See HPI for further details. All other systems are negative.     PAST MEDICAL HISTORY  Past Medical History:   Diagnosis Date   • MS (multiple sclerosis) (CMS-[facility])        FAMILY HISTORY  History reviewed. No pertinent family history.    SOCIAL HISTORY  Social History     Social History   • Marital status: Single     Spouse name: N/A   • Number of children: N/A   • Years of " "education: N/A     Social History Main Topics   • Smoking status: Never Smoker   • Smokeless tobacco: Not on file   • Alcohol use Yes   • Drug use:      Types: Inhaled      Comment: daily marijuana   • Sexual activity: Not on file     Other Topics Concern   • Not on file     Social History Narrative   • No narrative on file       SURGICAL HISTORY  No past surgical history on file.    CURRENT MEDICATIONS  Home Medications    **Home medications have not yet been reviewed for this encounter**         ALLERGIES  Allergies   Allergen Reactions   • Pcn [Penicillins]      Hives         PHYSICAL EXAM  VITAL SIGNS: /92   Pulse 72   Temp 37.4 °C (99.3 °F)   Resp 17   Ht 1.6 m (5' 3\")   Wt 42.6 kg (93 lb 14.7 oz)   SpO2 100%   BMI 16.64 kg/m²   Constitutional: Awake, alert, in no acute distress, Non-toxic appearance.   HENT: Atraumatic. Bilateral external ears normal, mucous membranes moist, throat nonerythematous without exudates, nose is normal.  Eyes: PERRL, EOMI, conjunctiva moist, noninjected.There is no photophobia or nystagmus.  Neck: Nontender, Normal range of motion, No nuchal rigidity, No stridor.   Lymphatic: No lymphadenopathy noted.   Cardiovascular: Regular rate and rhythm, no murmurs, rubs, gallops.  Thorax & Lungs:  Good breath sounds bilaterally, no wheezes, rales, or retractions.  No chest tenderness.  Abdomen: Bowel sounds normal, Soft, nontender, nondistended, no rebound, guarding, masses.  Back: No CVA or spinal tenderness.  Extremities: Intact distal pulses, No edema, No tenderness.   Skin: Warm, Dry, No rashes.   Musculoskeletal: No joint swelling or tenderness.  Neurologic: Alert & oriented x 3, cranial nerves II through XII are intact, speech is fluent, no facial asymmetry, tongue protrudes midline, sensory shows subjective numbness to the right upper and right lower extremities, and motor function shows 5/5 strength in the left upper and left lower extremity, 4/5 strength to the right " upper and right lower extremities, a slight pronator drift on the right, alternating finger movements are slowed on the right.  Psychiatric: Affect normal, Judgment normal, Mood normal.         RADIOLOGY/PROCEDURES  DX-CHEST-PORTABLE (1 VIEW)   Final Result         1. No acute cardiopulmonary abnormalities are identified.      CT-HEAD W/O   Final Result         1. No acute intracranial abnormality. No evidence of acute intracranial hemorrhage or mass lesion.                     COURSE & MEDICAL DECISION MAKING  Pertinent Labs & Imaging studies reviewed. (See chart for details)  The patient presents with the above complaints. She is afebrile, with no obvious signs of infection. She has no meningismus signs. She does complain of a headache, and has some weakness on her right side which she has had with her MS previously, along with some new numbness in tingling to the right side.  IV is placed, she was given normal saline, morphine, and Zofran. CT scan head without contrast shows no acute intracranial abnormalities. Chest x-ray shows no acute cardiopulmonary abnormalities. CBC shows a white count of 8100, 49% polys, chemistry profile normal, troponin less than 0.01, INR 1.09. HCG was negative.  On reexamination the patient reported little relief of her headache. She was given a dose of Toradol.  Given her new numbness to the right side of body, the presence of weakness, I feel she needs an MRI. Patient will be admitted. Case discussed Dr. Green.    FINAL IMPRESSION  1. Right sided numbness and weakness  2. Headache  3. Nausea and vomiting  4. Floaters right eye         Electronically signed by: Roberto Willett, 10/4/2017 7:05 PM

## 2017-10-06 ENCOUNTER — PATIENT OUTREACH (OUTPATIENT)
Dept: HEALTH INFORMATION MANAGEMENT | Facility: OTHER | Age: 27
End: 2017-10-06

## 2017-10-06 PROBLEM — F12.10 MARIJUANA ABUSE: Status: ACTIVE | Noted: 2017-10-06

## 2017-10-06 PROCEDURE — 700102 HCHG RX REV CODE 250 W/ 637 OVERRIDE(OP): Performed by: HOSPITALIST

## 2017-10-06 PROCEDURE — 96366 THER/PROPH/DIAG IV INF ADDON: CPT

## 2017-10-06 PROCEDURE — 700111 HCHG RX REV CODE 636 W/ 250 OVERRIDE (IP): Performed by: PSYCHIATRY & NEUROLOGY

## 2017-10-06 PROCEDURE — A9270 NON-COVERED ITEM OR SERVICE: HCPCS | Performed by: HOSPITALIST

## 2017-10-06 PROCEDURE — 700105 HCHG RX REV CODE 258: Performed by: INTERNAL MEDICINE

## 2017-10-06 PROCEDURE — 700102 HCHG RX REV CODE 250 W/ 637 OVERRIDE(OP): Performed by: INTERNAL MEDICINE

## 2017-10-06 PROCEDURE — A9270 NON-COVERED ITEM OR SERVICE: HCPCS | Performed by: INTERNAL MEDICINE

## 2017-10-06 PROCEDURE — 700105 HCHG RX REV CODE 258: Performed by: PSYCHIATRY & NEUROLOGY

## 2017-10-06 PROCEDURE — 700111 HCHG RX REV CODE 636 W/ 250 OVERRIDE (IP): Performed by: HOSPITALIST

## 2017-10-06 PROCEDURE — 99225 PR SUBSEQUENT OBSERVATION CARE,LEVEL II: CPT | Performed by: HOSPITALIST

## 2017-10-06 PROCEDURE — 770020 HCHG ROOM/CARE - TELE (206)

## 2017-10-06 PROCEDURE — 700101 HCHG RX REV CODE 250: Performed by: HOSPITALIST

## 2017-10-06 PROCEDURE — 96365 THER/PROPH/DIAG IV INF INIT: CPT

## 2017-10-06 PROCEDURE — 96376 TX/PRO/DX INJ SAME DRUG ADON: CPT

## 2017-10-06 RX ORDER — CYCLOBENZAPRINE HCL 10 MG
10 TABLET ORAL 3 TIMES DAILY PRN
Status: DISCONTINUED | OUTPATIENT
Start: 2017-10-06 | End: 2017-10-09 | Stop reason: HOSPADM

## 2017-10-06 RX ORDER — LIDOCAINE 50 MG/G
2 PATCH TOPICAL EVERY 24 HOURS
Status: DISCONTINUED | OUTPATIENT
Start: 2017-10-06 | End: 2017-10-09 | Stop reason: HOSPADM

## 2017-10-06 RX ORDER — MORPHINE SULFATE 4 MG/ML
2 INJECTION, SOLUTION INTRAMUSCULAR; INTRAVENOUS EVERY 4 HOURS PRN
Status: DISCONTINUED | OUTPATIENT
Start: 2017-10-06 | End: 2017-10-09 | Stop reason: HOSPADM

## 2017-10-06 RX ADMIN — KETOROLAC TROMETHAMINE 60 MG: 30 INJECTION, SOLUTION INTRAMUSCULAR at 12:07

## 2017-10-06 RX ADMIN — ASPIRIN 325 MG: 325 TABLET, COATED ORAL at 19:50

## 2017-10-06 RX ADMIN — CYCLOBENZAPRINE 10 MG: 10 TABLET, FILM COATED ORAL at 11:16

## 2017-10-06 RX ADMIN — KETOROLAC TROMETHAMINE 60 MG: 30 INJECTION, SOLUTION INTRAMUSCULAR at 22:09

## 2017-10-06 RX ADMIN — DIPHENHYDRAMINE HYDROCHLORIDE 25 MG: 50 INJECTION, SOLUTION INTRAMUSCULAR; INTRAVENOUS at 05:23

## 2017-10-06 RX ADMIN — DIPHENHYDRAMINE HYDROCHLORIDE 25 MG: 50 INJECTION, SOLUTION INTRAMUSCULAR; INTRAVENOUS at 12:05

## 2017-10-06 RX ADMIN — SODIUM CHLORIDE: 9 INJECTION, SOLUTION INTRAVENOUS at 23:36

## 2017-10-06 RX ADMIN — SODIUM CHLORIDE 1000 MG: 9 INJECTION, SOLUTION INTRAVENOUS at 12:06

## 2017-10-06 RX ADMIN — MORPHINE SULFATE 2 MG: 4 INJECTION INTRAVENOUS at 22:05

## 2017-10-06 RX ADMIN — KETOROLAC TROMETHAMINE 60 MG: 30 INJECTION, SOLUTION INTRAMUSCULAR at 05:22

## 2017-10-06 RX ADMIN — MORPHINE SULFATE 2 MG: 4 INJECTION INTRAVENOUS at 16:26

## 2017-10-06 RX ADMIN — LIDOCAINE 2 PATCH: 50 PATCH TOPICAL at 11:17

## 2017-10-06 RX ADMIN — DIPHENHYDRAMINE HYDROCHLORIDE 25 MG: 50 INJECTION, SOLUTION INTRAMUSCULAR; INTRAVENOUS at 19:50

## 2017-10-06 ASSESSMENT — ENCOUNTER SYMPTOMS
SPUTUM PRODUCTION: 0
CONSTIPATION: 0
NECK PAIN: 0
BACK PAIN: 1
FEVER: 0
PHOTOPHOBIA: 0
CHILLS: 0
MEMORY LOSS: 0
SORE THROAT: 0
VOMITING: 0
HEMOPTYSIS: 0
SENSORY CHANGE: 0
NAUSEA: 0
DEPRESSION: 0
BLOOD IN STOOL: 0
WEAKNESS: 1
BLURRED VISION: 0
DIZZINESS: 0
EYE PAIN: 0
TINGLING: 0
CLAUDICATION: 0
HEADACHES: 0
MYALGIAS: 1
NERVOUS/ANXIOUS: 1
TREMORS: 0
SPEECH CHANGE: 0
SHORTNESS OF BREATH: 0
ORTHOPNEA: 0
HEARTBURN: 0
DOUBLE VISION: 0
PND: 0
PALPITATIONS: 0
COUGH: 0
STRIDOR: 0

## 2017-10-06 ASSESSMENT — PAIN SCALES - GENERAL
PAINLEVEL_OUTOF10: 4
PAINLEVEL_OUTOF10: 6
PAINLEVEL_OUTOF10: 7
PAINLEVEL_OUTOF10: 3
PAINLEVEL_OUTOF10: 5
PAINLEVEL_OUTOF10: 2
PAINLEVEL_OUTOF10: 6

## 2017-10-06 ASSESSMENT — LIFESTYLE VARIABLES
SUBSTANCE_ABUSE: 1
HAVE PEOPLE ANNOYED YOU BY CRITICIZING YOUR DRINKING: NO
ON A TYPICAL DAY WHEN YOU DRINK ALCOHOL HOW MANY DRINKS DO YOU HAVE: 0
TOTAL SCORE: 0
AVERAGE NUMBER OF DAYS PER WEEK YOU HAVE A DRINK CONTAINING ALCOHOL: 0
EVER HAD A DRINK FIRST THING IN THE MORNING TO STEADY YOUR NERVES TO GET RID OF A HANGOVER: NO
CONSUMPTION TOTAL: NEGATIVE
EVER FELT BAD OR GUILTY ABOUT YOUR DRINKING: NO
HOW MANY TIMES IN THE PAST YEAR HAVE YOU HAD 5 OR MORE DRINKS IN A DAY: 0
ALCOHOL_USE: NO
HAVE YOU EVER FELT YOU SHOULD CUT DOWN ON YOUR DRINKING: NO
TOTAL SCORE: 0
EVER_SMOKED: NEVER
TOTAL SCORE: 0

## 2017-10-06 ASSESSMENT — COPD QUESTIONNAIRES
COPD SCREENING SCORE: 0
DO YOU EVER COUGH UP ANY MUCUS OR PHLEGM?: NO/ONLY WITH OCCASIONAL COLDS OR INFECTIONS
DURING THE PAST 4 WEEKS HOW MUCH DID YOU FEEL SHORT OF BREATH: NONE/LITTLE OF THE TIME
HAVE YOU SMOKED AT LEAST 100 CIGARETTES IN YOUR ENTIRE LIFE: NO/DON'T KNOW

## 2017-10-06 ASSESSMENT — PATIENT HEALTH QUESTIONNAIRE - PHQ9
SUM OF ALL RESPONSES TO PHQ9 QUESTIONS 1 AND 2: 0
SUM OF ALL RESPONSES TO PHQ QUESTIONS 1-9: 0
1. LITTLE INTEREST OR PLEASURE IN DOING THINGS: NOT AT ALL
2. FEELING DOWN, DEPRESSED, IRRITABLE, OR HOPELESS: NOT AT ALL

## 2017-10-06 NOTE — DISCHARGE PLANNING
Informed by bedside RN Manohar that needed a FWW for stability.  Presented choice form to pt, choice made, pt signed form.  Form faxed to [facility].

## 2017-10-06 NOTE — PROGRESS NOTES
Assessment done, Pt educated on plan of care. Pt is upset  regarding results not being done and pain needs waiting on dr rounds will address in dr rounds  Patient resting in bed, no signs of distress,  no complains of pain at this time. Call light within reach,  side rails up, will monitor. Condition stable

## 2017-10-06 NOTE — PROGRESS NOTES
RECEIVED REPORT FROM NOC RN CALLED FILM ROOM STATES THE MRI WILL GET READ THIS AM THAT SHE IS ON THE LIST

## 2017-10-06 NOTE — PROGRESS NOTES
"Pt on call light. Pt states if MD does not come, will be leaving AMA. states \"you guys are not doing anything for me here. Why should I lay around and do nothing and take medications that aren't doing nothing.\"  "

## 2017-10-06 NOTE — PROGRESS NOTES
"Pt requesting for pain medication. Refuses to take tylenol and benadryl stating \"i'm not taking all those tylenol pills because they aren't doing anything for me. I'm not taking the benadryl because it makes me itchy.\" Emilie AUGUSTE notified, no new orders received.  "

## 2017-10-06 NOTE — PROGRESS NOTES
[facility] Hospitalist Progress Note    Date of Service: 10/5/2017    Chief Complaint  27 y.o. female admitted 10/4/2017 with progressive gait ataxia with right lower   extremity heaviness and paresthesias, possibly MS exacerbation    Interval Problem Update  Patient reports left lower extremity weakness, ataxia, as well as headaches.   MRI brain pending  Neurology consulted, appreciate recommendations  1g IV solumedrol started.    Consultants/Specialty  Neurology    Disposition  TBD        Review of Systems   Constitutional: Positive for malaise/fatigue. Negative for chills and fever.   HENT: Negative for congestion, hearing loss, sore throat and tinnitus.    Eyes: Negative for blurred vision, double vision, photophobia and pain.   Respiratory: Negative for cough, hemoptysis, sputum production, shortness of breath and stridor.    Cardiovascular: Negative for chest pain, palpitations, orthopnea, claudication and PND.   Gastrointestinal: Negative for blood in stool, constipation, heartburn, melena, nausea and vomiting.   Genitourinary: Negative for dysuria, frequency and urgency.   Musculoskeletal: Positive for myalgias. Negative for back pain and neck pain.   Neurological: Positive for tingling, sensory change, weakness and headaches. Negative for dizziness, tremors and speech change.   Psychiatric/Behavioral: Negative for depression, memory loss and suicidal ideas. The patient is not nervous/anxious.       Physical Exam  Laboratory/Imaging   Hemodynamics  Temp (24hrs), Av.9 °C (98.4 °F), Min:36.4 °C (97.5 °F), Max:37.6 °C (99.6 °F)   Temperature: 37.6 °C (99.6 °F)  Pulse  Av.7  Min: 68  Max: 88    Blood Pressure: 121/85      Respiratory      Respiration: 20, Pulse Oximetry: 100 %             Fluids    Intake/Output Summary (Last 24 hours) at 10/05/17 7614  Last data filed at 10/05/17 0659   Gross per 24 hour   Intake              400 ml   Output                0 ml   Net              400 ml        Nutrition  Orders Placed This Encounter   Procedures   • DIET ORDER     Standing Status:   Standing     Number of Occurrences:   1     Order Specific Question:   Diet:     Answer:   Regular [1]     Physical Exam   Constitutional: She is oriented to person, place, and time. She appears well-developed and well-nourished. No distress.   HENT:   Head: Normocephalic and atraumatic.   Mouth/Throat: No oropharyngeal exudate.   Eyes: Conjunctivae are normal. Pupils are equal, round, and reactive to light. Right eye exhibits no discharge. No scleral icterus.   Neck: Neck supple. No JVD present. No thyromegaly present.   Cardiovascular: Normal rate and intact distal pulses.    No murmur heard.  Pulmonary/Chest: Effort normal and breath sounds normal. No stridor. No respiratory distress. She has no wheezes. She has no rales.   Abdominal: Soft. Bowel sounds are normal. She exhibits no distension. There is no tenderness. There is no rebound.   Musculoskeletal: Normal range of motion. She exhibits no edema.   Neurological: She is alert and oriented to person, place, and time.   No aphasia, apraxia noted.  Sensory deficits with light touch in right LE      Skin: Skin is warm. She is not diaphoretic. No erythema.   Psychiatric: She has a normal mood and affect. Her behavior is normal. Thought content normal.       Recent Labs      10/04/17   1719  10/05/17   0345   WBC  8.1  8.2   RBC  4.33  3.83*   HEMOGLOBIN  13.7  12.0   HEMATOCRIT  41.4  37.8   MCV  95.6  98.7*   MCH  31.6  31.3   MCHC  33.1*  31.7*   RDW  45.5  46.4   PLATELETCT  236  212   MPV  9.4  9.6     Recent Labs      10/04/17   1719  10/05/17   0345   SODIUM  137  138   POTASSIUM  4.1  4.1   CHLORIDE  107  112   CO2  22  22   GLUCOSE  98  97   BUN  12  10   CREATININE  0.73  0.59   CALCIUM  9.4  8.3*     Recent Labs      10/04/17   1719   APTT  24.6*   INR  1.09         Recent Labs      10/05/17   0345   TRIGLYCERIDE  41   HDL  46   LDL  36           Assessment/Plan     * Multiple sclerosis exacerbation (CMS-HCC)   Assessment & Plan    Possibly 2/2 to MS exacerbation?  MRI brain pending  Started on 1g IV solumdrdol x 5 days  Neurology following  Continue Copaxone.          Chronic pain syndrome   Assessment & Plan    Continue toradol, avoid narcotics.            Reviewed items::  Labs reviewed, Medications reviewed, Radiology images reviewed and EKG reviewed  Foss catheter::  No Foss  DVT prophylaxis pharmacological::  Heparin  DVT prophylaxis - mechanical:  SCDs

## 2017-10-06 NOTE — PROGRESS NOTES
"Pt c/o pain and states she will not take methylprednisolone until given pain medication because it \"causes burning\". Pt informed MD is not going to add any additional pain medications. Pt refuse methylprednisolone.  "

## 2017-10-06 NOTE — PROGRESS NOTES
Pt refusing all night medications at this time. States she wants to speak to MD in person in regards to pain and nausea management. MD paged.    Pt did get up to shower at this time, tolerated well.

## 2017-10-06 NOTE — CONSULTS
DATE OF SERVICE:  10/05/2017    NEUROLOGY CONSULTATION     CHIEF COMPLAINT:  Neurologic consultation is requested on this young lady   admitted for 4-5 day history of progressive gait ataxia with right lower   extremity heaviness and paresthesias, cognitive impairment, increased malaise,   all consistent with possible disease relapses related to MS.    HISTORY OF PRESENT ILLNESS:  Patient is a pleasant 27-year-old   -American lady who was diagnosed with her disease after rather thorough   workup in [place], [place] dating back almost 3-4 years.  Since that time, she   has been on Copaxone, seen by Antonio Cheung MD in October 2016 for her disease,   it was his impression that she does in fact have the disease, she was started   on Copaxone formulation of 40 mg 3 times a week.  She states she has been   compliant, but lost to follow up because of Dr. Cheung's immediate long term.    She was supposed to be on Celexa for generalized pain, she never took the   drug.    She has a baseline some mild gait ataxia, though she uses a walker only   rarely.  Cognition has been minimally curtailed, she has no residual visual   complaints, bulbar symptoms, or focal motor or sensory distortions.      Three to 4 days prior to this admission, she had spontaneous onset of ataxia.    She simply felt more unsteady when she was walking, she required the use of a   walker more regularly.  The day prior to admission, the right leg became   involved with heaviness and paresthesias.  She was less aware of this in the   arm, but she began to notice changes in bladder function, she states she was   actually incontinent on a couple of occasions.  She began to notice cognitive   impairment with slowing, forgetfulness, and increasing fatigue.  She presented   here because of the symptoms and out of concern that she may be suffering   from the disease relapse.    Of all of this, she began to develop visual floaters and scintillations that    are typical of her migraine headaches, when she came into she also had a   headache, though the headache felt different, this time occipital rather than   holocephalic, but still of an intensity, where she had lost her appetite, and   was noticing some sensitivities to lights and sounds.    She states that since her diagnosis, she has had no disease relapses.  Her   last imaging study was done at the time of her diagnosis.    REVIEW OF SYSTEMS:  Other than the above, as per AMA and CMS guidelines, the   systems review is negative from my standpoint.    PAST MEDICAL HISTORY:  1.  Migraine with visual aura:  These are infrequent, typically holocephalic   with nausea and vomiting, heightened sensitivities, impaired concentration,   etc.  The headache is pulsatile. It is incapacitating.  2.  MS.    She denies any other history of significant diagnosed conditions.    SURGICAL HISTORY:  None.    MEDICATIONS:  At home, she uses Copaxone 40 mg injections 3 times a week and   occasional aspirin or Motrin for headache pain.    ALLERGIES:  PENICILLIN.    FAMILY HISTORY:  Negative for significant neurologic disease.    SOCIAL HISTORY:  She does not smoke, she will drink alcohol, denies   recreational drug use except medical marijuana for her generalized pain.    PHYSICAL EXAMINATION:  CONSTITUTIONAL:  She is sitting at the bedside and is in noticeable distress.    She is diffusely tremulous.  She is well developed and well nourished and is   cooperative.  VITAL SIGNS:  Stable, she is afebrile, pulse is 71 and regular, respiratory   rate 16, blood pressure 119/81 and she is 100% saturated on room air.  HEENT:  Negative for malar rash, jaw or temporal tenderness, jaw claudication,   or allodynia.  The occipital ridge is nontender bilaterally.  NECK:  Supple, without meningismus, Lhermitte's phenomenon is absent.  Carotid   pulses are present bilaterally without asymmetry.  COR:  Remarkable for a regular rhythm.  VASCULAR:   Negative for edema.    NEUROLOGIC EXAMINATION:  Her mental processing speed is slowed, but she is   also very anxious and has some difficulty maintaining mental focus.  Still,   she is fully oriented, there is no aphasia, apraxia, or inattention.    Pupillary reflexes are intact.  Extraocular muscle movements are full, visual   field assessment is a little inconsistent though there seems to be more   blurring and difficulty with seeing fingers when trying to finger count.    There is no gross visual loss to movement detection.  There is no facial   asymmetry, she splits the midline to pinprick, feeling the pin less on the   right side of the face when compared to the left.  There is no dysarthria, the   tongue and uvula are midline, shoulder shrug is symmetric.    Motor exam reveals some mild weakness with right upper and lower extremities,   but there may be some inconsistency with effort.  Strength and tone are intact   on the left.  She is very brisk throughout with her reflexes, the right side   more so than left, the right toe was equivocal, the left downgoing.    Spontaneous movements are done much more easily with the left arm and left   leg, then with the right side counterparts.  There is no appendicular dystaxia   with any of the extremities, the movements are simply more deliberate on the   right side.  Repetitive movements with the foot and hand on the leg are slowed   to a degree.  I did not stand the patient to walk.    Sensory exam is a little mixed, she feels temperature and pinprick less on the   right leg than the left, these seem to be felt equally in both upper   extremities, yet she splits the midline, feeling pinprick less on the right   side of the chest, more so on the left.  Vibration seems to be absent in the   right lower extremity when compared to the left, it is felt equally in the   upper extremities.    DIAGNOSTICS:  MRI scan of the brain with and without contrast is pending.     Hemogram revealed a WBC of 8.2, unremarkable differential, H/H 12/37.8 and   platelets of 212 K.  Chem profile was also normal including lactate   glycohemoglobin A1c and CPK.  Lipid profile is normal.  Troponin is normal.    Coagulation panel was also unremarkable.  Urinalysis was negative for active   infection.    IMPRESSION:  Though her examination is a little unusual, I will presume that   the workup in [place], [place], was very thorough, she describes CSF as well as   MRI imaging of the entire spinal axis, and she was placed on Copaxone.  In any   case, she will continue to the Copaxone, I will start her on   methylprednisolone 1 g daily for 5 days, but I will also get an MRI scan with   and without contrast of the brain.  From the looks of this, she probably has   some type of left hemispheric active process, but if the MRI of the brain is   clean, I will image the cervical spine as well.    She does have a history of migraine with aura, I suspect the headache she is   having now, even though it feels different to her, which is also combined with   the typical visual changes of scatoma and scintillations, does need to be   treated symptomatically.  Review of the notes from Dr. Cheung indicates he   recommended Cymbalta or another medication such as Topamax, she refused both,    but she may require prophylaxis.  For now Toradol/Reglan/Benadryl every 6   hours as needed will be started.  The steroids may provide benefit, though   this is doubtful.    She also complains of diffuse pains which may be part of fibromyalgia,   something that is often seen in young woman with migraine.  Cymbalta would be   an ideal choice in this circumstance as well.  We would recommend beginning   the drug as a result, though it is not clear to me how much benefit from a   headache standpoint, she would get.    Face to face time was spent reviewing all of the above.    PLAN:  1.  Start Cymbalta at 30 mg daily, increasing slowly up  to at least 60 mg   daily.  2.  Toradol 60 mg/Reglan 20 mg/Benadryl 25 mg every 6 hours p.r.n. for   headache.  3.  Methylprednisolone 1 g daily for 5 doses.  4.  MRI scan with and without contrast of the head, possibly of the cervical   spine, depending.  5.  Physical, occupational and speech therapies.  6.  I will follow the patient with you.    Thank you very much for this consultation.    TIME:  Evaluation of 70 minutes for exam, review, discussion and education.    DISCUSSION:  As mentioned in the assessment, over 60% of the time was spent   face-to-face in counseling and coordinating care.       ____________________________________     MD PHILOMENA HRARIS / [facility]    DD:  10/05/2017 16:06:37  DT:  10/05/2017 19:01:16    D#:  [id number]  Job#:  [id number]

## 2017-10-06 NOTE — DISCHARGE PLANNING
Received DME choice from Odilia) at 1132.  DME referral sent to [facility] at 1241.

## 2017-10-06 NOTE — PROGRESS NOTES
Pt stated to Rn I will not take my iv steroids till I get pain med's waiting on dr to see patient will update

## 2017-10-06 NOTE — FACE TO FACE
Face to Face Note  -  Durable Medical Equipment    Raleigh Kohler M.D. - NPI: [id number]  I certify that this patient is under my care and that they had a durable medical equipment(DME)face to face encounter by myself that meets the physician DME face-to-face encounter requirements with this patient on:    Date of encounter:   Patient:                    MRN:                       YOB: 2017  Nanci Mccormack  [id number]  1990     The encounter with the patient was in whole, or in part, for the following medical condition, which is the primary reason for durable medical equipment:  Other - joint pain    I certify that, based on my findings, the following durable medical equipment is medically necessary:  Walkers.    HOME O2 Saturation Measurements:(Values must be present for Home Oxygen orders)         ,     ,         My Clinical findings support the need for the above equipment due to:  Abnormal Gait    Supporting Symptoms: bilateral hip pain, multiple sclerosis

## 2017-10-07 PROCEDURE — G8980 MOBILITY D/C STATUS: HCPCS | Mod: CJ

## 2017-10-07 PROCEDURE — 700111 HCHG RX REV CODE 636 W/ 250 OVERRIDE (IP): Performed by: PSYCHIATRY & NEUROLOGY

## 2017-10-07 PROCEDURE — G8978 MOBILITY CURRENT STATUS: HCPCS | Mod: CJ

## 2017-10-07 PROCEDURE — 700105 HCHG RX REV CODE 258: Performed by: PSYCHIATRY & NEUROLOGY

## 2017-10-07 PROCEDURE — 700101 HCHG RX REV CODE 250: Performed by: HOSPITALIST

## 2017-10-07 PROCEDURE — 97161 PT EVAL LOW COMPLEX 20 MIN: CPT

## 2017-10-07 PROCEDURE — 700111 HCHG RX REV CODE 636 W/ 250 OVERRIDE (IP): Performed by: HOSPITALIST

## 2017-10-07 PROCEDURE — 700102 HCHG RX REV CODE 250 W/ 637 OVERRIDE(OP): Performed by: FAMILY MEDICINE

## 2017-10-07 PROCEDURE — 99233 SBSQ HOSP IP/OBS HIGH 50: CPT | Performed by: FAMILY MEDICINE

## 2017-10-07 PROCEDURE — G8979 MOBILITY GOAL STATUS: HCPCS | Mod: CJ

## 2017-10-07 PROCEDURE — 700105 HCHG RX REV CODE 258: Performed by: INTERNAL MEDICINE

## 2017-10-07 PROCEDURE — 770020 HCHG ROOM/CARE - TELE (206)

## 2017-10-07 PROCEDURE — A9270 NON-COVERED ITEM OR SERVICE: HCPCS | Performed by: FAMILY MEDICINE

## 2017-10-07 PROCEDURE — 700111 HCHG RX REV CODE 636 W/ 250 OVERRIDE (IP): Performed by: INTERNAL MEDICINE

## 2017-10-07 RX ORDER — ZOLPIDEM TARTRATE 5 MG/1
5 TABLET ORAL NIGHTLY PRN
Status: DISCONTINUED | OUTPATIENT
Start: 2017-10-07 | End: 2017-10-09 | Stop reason: HOSPADM

## 2017-10-07 RX ADMIN — DIPHENHYDRAMINE HYDROCHLORIDE: 50 INJECTION, SOLUTION INTRAMUSCULAR; INTRAVENOUS at 02:17

## 2017-10-07 RX ADMIN — DIPHENHYDRAMINE HYDROCHLORIDE 25 MG: 50 INJECTION, SOLUTION INTRAMUSCULAR; INTRAVENOUS at 19:16

## 2017-10-07 RX ADMIN — MORPHINE SULFATE 2 MG: 4 INJECTION INTRAVENOUS at 02:13

## 2017-10-07 RX ADMIN — MORPHINE SULFATE 2 MG: 4 INJECTION INTRAVENOUS at 06:33

## 2017-10-07 RX ADMIN — MORPHINE SULFATE 2 MG: 4 INJECTION INTRAVENOUS at 10:41

## 2017-10-07 RX ADMIN — SODIUM CHLORIDE: 9 INJECTION, SOLUTION INTRAVENOUS at 19:19

## 2017-10-07 RX ADMIN — SODIUM CHLORIDE 1000 MG: 9 INJECTION, SOLUTION INTRAVENOUS at 10:43

## 2017-10-07 RX ADMIN — DIPHENHYDRAMINE HYDROCHLORIDE 25 MG: 50 INJECTION, SOLUTION INTRAMUSCULAR; INTRAVENOUS at 11:04

## 2017-10-07 RX ADMIN — KETOROLAC TROMETHAMINE 30 MG: 30 INJECTION, SOLUTION INTRAMUSCULAR at 23:26

## 2017-10-07 RX ADMIN — LIDOCAINE 2 PATCH: 50 PATCH TOPICAL at 11:01

## 2017-10-07 RX ADMIN — ZOLPIDEM TARTRATE 5 MG: 5 TABLET, FILM COATED ORAL at 23:19

## 2017-10-07 RX ADMIN — MORPHINE SULFATE 2 MG: 4 INJECTION INTRAVENOUS at 14:49

## 2017-10-07 RX ADMIN — MORPHINE SULFATE 2 MG: 4 INJECTION INTRAVENOUS at 23:19

## 2017-10-07 RX ADMIN — MORPHINE SULFATE 2 MG: 4 INJECTION INTRAVENOUS at 19:11

## 2017-10-07 ASSESSMENT — ENCOUNTER SYMPTOMS
BLURRED VISION: 0
HEADACHES: 0
CHILLS: 0
FEVER: 0
SORE THROAT: 0
VOMITING: 0
COUGH: 0
HEARTBURN: 0
SPEECH CHANGE: 0
WHEEZING: 0
FOCAL WEAKNESS: 1
NAUSEA: 0
SHORTNESS OF BREATH: 0
DIZZINESS: 0
DIARRHEA: 0
SENSORY CHANGE: 1
ABDOMINAL PAIN: 0
WEAKNESS: 1

## 2017-10-07 ASSESSMENT — PAIN SCALES - GENERAL
PAINLEVEL_OUTOF10: 7
PAINLEVEL_OUTOF10: 9
PAINLEVEL_OUTOF10: 2
PAINLEVEL_OUTOF10: 8
PAINLEVEL_OUTOF10: 8
PAINLEVEL_OUTOF10: 7
PAINLEVEL_OUTOF10: 8
PAINLEVEL_OUTOF10: 9
PAINLEVEL_OUTOF10: 5
PAINLEVEL_OUTOF10: 4

## 2017-10-07 ASSESSMENT — GAIT ASSESSMENTS
DEVIATION: ANTALGIC;OTHER (COMMENT)
DISTANCE (FEET): 10
GAIT LEVEL OF ASSIST: STAND BY ASSIST

## 2017-10-07 ASSESSMENT — COGNITIVE AND FUNCTIONAL STATUS - GENERAL
WALKING IN HOSPITAL ROOM: A LITTLE
MOVING TO AND FROM BED TO CHAIR: A LITTLE
MOVING FROM LYING ON BACK TO SITTING ON SIDE OF FLAT BED: A LITTLE
MOBILITY SCORE: 20
SUGGESTED CMS G CODE MODIFIER MOBILITY: CJ
CLIMB 3 TO 5 STEPS WITH RAILING: A LITTLE

## 2017-10-07 NOTE — PROGRESS NOTES
Pt c/o pain in her right hip. Requesting to have morphine specifically. Rating pain 7/10. PRN rx given per order. Pt polite and cooperative.

## 2017-10-07 NOTE — PROGRESS NOTES
Pt updated on plan of care in regards to being transferred to inpatient, verbalizes understanding.

## 2017-10-07 NOTE — CARE PLAN
Problem: Pain Management  Goal: Pain level will decrease to patient's comfort goal  Outcome: PROGRESSING SLOWER THAN EXPECTED  Pain med given    Problem: Psychosocial Needs:  Goal: Level of anxiety will decrease  Outcome: PROGRESSING SLOWER THAN EXPECTED  Pt was agitated at the beginning of shift, problem was resolved.

## 2017-10-07 NOTE — PROGRESS NOTES
UP TO BATHROOM WITH FWW CALL LIGHT WITHIN REACH BED IN LOW POSITION NO OTHER CHANGE IN ASSESSMENT

## 2017-10-07 NOTE — CARE PLAN
Problem: Safety  Goal: Will remain free from injury  Outcome: PROGRESSING AS EXPECTED  Call light within reach, hourly rounding in practice.    Problem: Pain Management  Goal: Pain level will decrease to patient's comfort goal  Outcome: PROGRESSING AS EXPECTED  Pt receiving prn pain medication as ordered per MD. Pt also using distraction to decrease pain.

## 2017-10-07 NOTE — PROGRESS NOTES
Report called to Cora Wallace. Pt being transferred up to room T707-2 by bed with RN and transport tech. Pt medicated for pain prior to transfer. All belongings sent with patient.

## 2017-10-07 NOTE — PROGRESS NOTES
Report received at bedside, assumed care. Pt is sleeping in bed. Tele box on. Chart reviewed and POC updated with patient. Bed in lowest position and call light within reach.

## 2017-10-07 NOTE — PROGRESS NOTES
Pt arrived to floor. Pt stated my kids are staying with me the doctor downstairs said it was okay. CN came and spoke to pt, pt got agitated. VS and assessment were not able to be done at this time due to pt's behavior.    RN and CN will call [facility].

## 2017-10-07 NOTE — THERAPY
"Physical Therapy Evaluation completed.   Bed Mobility:  Supine to Sit: Modified Independent  Transfers: Sit to Stand: Supervised  Gait: Level Of Assist: Stand by Assist with No Equipment Needed       Plan of Care: Patient with no further skilled PT needs in the acute care setting at this time and Patient demonstrates safety with mobility in this environment at this time.   Discharge Recommendations: Equipment: No Equipment Needed. Post-acute therapy Discharge to home with outpatient or home health for additional skilled therapy services.    Pt presents to acute PT s/p chronic and constant back and hip pain with antalgic gait, mild debility, weakness, impaired functional mobility/endurance. Pt no longer requires skilled acute PT but would benefit from skilled oupatient PT post d/c.    See \"Rehab Therapy-Acute\" Patient Summary Report for complete documentation.     "

## 2017-10-07 NOTE — PROGRESS NOTES
Pt educated on POC, all questions answered in regards to disease process, treatment and diet. Pt verbalize understanding and voice no further questions at this time.

## 2017-10-07 NOTE — PROGRESS NOTES
[facility] Hospitalist Progress Note    Date of Service: 10/7/2017    Chief Complaint  27 y.o. female admitted 10/4/2017 with MS exacerbation    Interval Problem Update  MS - feels stronger, less numbness  Pain - better controlled    Consultants/Specialty  Neuro - Spivack    Disposition  Complete steroid course        Review of Systems   Constitutional: Positive for malaise/fatigue. Negative for chills and fever.   HENT: Negative for sore throat.    Eyes: Negative for blurred vision.   Respiratory: Negative for cough, shortness of breath and wheezing.    Cardiovascular: Negative for chest pain and leg swelling.   Gastrointestinal: Negative for abdominal pain, diarrhea, heartburn, nausea and vomiting.   Genitourinary: Negative for dysuria.   Neurological: Positive for sensory change, focal weakness and weakness. Negative for dizziness, speech change and headaches.      Physical Exam  Laboratory/Imaging   Hemodynamics  Temp (24hrs), Av.1 °C (98.7 °F), Min:36.6 °C (97.8 °F), Max:37.6 °C (99.6 °F)   Temperature: 36.6 °C (97.8 °F)  Pulse  Av.6  Min: 68  Max: 116    Blood Pressure: 136/94      Respiratory      Respiration: 17, Pulse Oximetry: 100 %             Fluids    Intake/Output Summary (Last 24 hours) at 10/07/17 1152  Last data filed at 10/07/17 0600   Gross per 24 hour   Intake             1015 ml   Output                0 ml   Net             1015 ml       Nutrition  Orders Placed This Encounter   Procedures   • DIET ORDER     Standing Status:   Standing     Number of Occurrences:   1     Order Specific Question:   Diet:     Answer:   Regular [1]     Physical Exam   Constitutional: She is oriented to person, place, and time. She appears well-developed and well-nourished.   HENT:   Head: Normocephalic and atraumatic.   Eyes: Conjunctivae are normal. Pupils are equal, round, and reactive to light.   Neck: No tracheal deviation present. No thyromegaly present.   Cardiovascular: Normal rate and regular rhythm.     Pulmonary/Chest: Effort normal and breath sounds normal.   Abdominal: Soft. Bowel sounds are normal. She exhibits no distension. There is no tenderness.   Lymphadenopathy:     She has no cervical adenopathy.   Neurological: She is alert and oriented to person, place, and time. No cranial nerve deficit.   MMT RUE and RLE 4/5, LUE and LLE 4+/5   Skin: Skin is warm and dry.   Nursing note and vitals reviewed.      Recent Labs      10/04/17   1719  10/05/17   0345   WBC  8.1  8.2   RBC  4.33  3.83*   HEMOGLOBIN  13.7  12.0   HEMATOCRIT  41.4  37.8   MCV  95.6  98.7*   MCH  31.6  31.3   MCHC  33.1*  31.7*   RDW  45.5  46.4   PLATELETCT  236  212   MPV  9.4  9.6     Recent Labs      10/04/17   1719  10/05/17   0345   SODIUM  137  138   POTASSIUM  4.1  4.1   CHLORIDE  107  112   CO2  22  22   GLUCOSE  98  97   BUN  12  10   CREATININE  0.73  0.59   CALCIUM  9.4  8.3*     Recent Labs      10/04/17   1719   APTT  24.6*   INR  1.09         Recent Labs      10/05/17   0345   TRIGLYCERIDE  41   HDL  46   LDL  36          Assessment/Plan     * Multiple sclerosis exacerbation (CMS-HCC)   Assessment & Plan    IV Solumedrol x 5d            Marijuana abuse- (present on admission)   Assessment & Plan    Watch for withdrawal symptoms        Chronic pain syndrome   Assessment & Plan    Pain control            Reviewed items::  Radiology images reviewed, Labs reviewed and Medications reviewed  Foss catheter::  No Foss  DVT prophylaxis pharmacological::  Enoxaparin (Lovenox)  Ulcer Prophylaxis::  No

## 2017-10-07 NOTE — PROGRESS NOTES
[facility] Hospitalist Progress Note    Date of Service: 10/6/2017    Chief Complaint  27 y.o. female admitted 10/4/2017 with progressive gait ataxia with right lower   extremity heaviness and paresthesias, possibly MS exacerbation    Interval Problem Update  Patient reports left lower extremity weakness, ataxia, as well as headaches.   MRI brain pending  Neurology consulted, appreciate recommendations  1g IV solumedrol started.    10/6  Patient reports having b/l hip pain, was combative and wanted pain medications. I tried using a lidoderm patch and flexiril but she vomited the pill and said she cannot tolerate it. Patient persistently wanting pain medications, but will only provide her with low dose for patient safety. But otherwise is calm at this time. Patient says her pain and weakness in extremity has improved but just has hip pain.    Consultants/Specialty  Neurology    Disposition  TBD        Review of Systems   Constitutional: Positive for malaise/fatigue. Negative for chills and fever.   HENT: Negative for congestion, hearing loss, sore throat and tinnitus.    Eyes: Negative for blurred vision, double vision, photophobia and pain.   Respiratory: Negative for cough, hemoptysis, sputum production, shortness of breath and stridor.    Cardiovascular: Negative for chest pain, palpitations, orthopnea, claudication and PND.   Gastrointestinal: Negative for blood in stool, constipation, heartburn, melena, nausea and vomiting.   Genitourinary: Negative for dysuria, frequency and urgency.   Musculoskeletal: Positive for back pain and myalgias. Negative for neck pain.   Neurological: Positive for weakness. Negative for dizziness, tingling, tremors, sensory change, speech change and headaches.   Psychiatric/Behavioral: Positive for substance abuse. Negative for depression, memory loss and suicidal ideas. The patient is nervous/anxious.       Physical Exam  Laboratory/Imaging   Hemodynamics  Temp (24hrs), Av.3 °C (99.1  °F), Min:36.9 °C (98.4 °F), Max:37.6 °C (99.6 °F)   Temperature: 37.6 °C (99.6 °F)  Pulse  Av.3  Min: 68  Max: 116    Blood Pressure: 128/85      Respiratory      Respiration: 20, Pulse Oximetry: 97 %             Fluids  No intake or output data in the 24 hours ending 10/06/17 1814    Nutrition  Orders Placed This Encounter   Procedures   • DIET ORDER     Standing Status:   Standing     Number of Occurrences:   1     Order Specific Question:   Diet:     Answer:   Regular [1]     Physical Exam   Constitutional: She is oriented to person, place, and time. She appears well-developed and well-nourished. No distress.   HENT:   Head: Normocephalic and atraumatic.   Mouth/Throat: No oropharyngeal exudate.   Eyes: Conjunctivae are normal. Pupils are equal, round, and reactive to light. Right eye exhibits no discharge. No scleral icterus.   Neck: Neck supple. No JVD present. No thyromegaly present.   Cardiovascular: Normal rate and intact distal pulses.    No murmur heard.  Pulmonary/Chest: Effort normal and breath sounds normal. No stridor. No respiratory distress. She has no wheezes. She has no rales.   Abdominal: Soft. Bowel sounds are normal. She exhibits no distension. There is no tenderness. There is no rebound.   Musculoskeletal: Normal range of motion. She exhibits no edema.   B/l hip pain illicited on palpation.   Neurological: She is alert and oriented to person, place, and time. No cranial nerve deficit.   No aphasia, apraxia noted.  Sensory deficits with light touch in right LE      Skin: Skin is warm. She is not diaphoretic. No erythema.   Psychiatric:   agitated        Recent Labs      10/04/17   1719  10/05/17   034   WBC  8.1  8.2   RBC  4.33  3.83*   HEMOGLOBIN  13.7  12.0   HEMATOCRIT  41.4  37.8   MCV  95.6  98.7*   MCH  31.6  31.3   MCHC  33.1*  31.7*   RDW  45.5  46.4   PLATELETCT  236  212   MPV  9.4  9.6     Recent Labs      10/04/17   1719  10/05/17   034   SODIUM  137  138   POTASSIUM  4.1   "4.1   CHLORIDE  107  112   CO2  22  22   GLUCOSE  98  97   BUN  12  10   CREATININE  0.73  0.59   CALCIUM  9.4  8.3*     Recent Labs      10/04/17   1719   APTT  24.6*   INR  1.09         Recent Labs      10/05/17   0345   TRIGLYCERIDE  41   HDL  46   LDL  36          Assessment/Plan     * Multiple sclerosis exacerbation (CMS-[facility])   Assessment & Plan    Possibly 2/2 to MS exacerbation?  MRI brain shows Multiple periventricular white matter lesions more prominent posteriorly highly suspicious for demyelinating process such as multiple sclerosis. No MRI scan of the brain is available for direct comparison.  2.  No \"active\" enhancing lesions in the brain parenchyma  Continue 1g IV solumdrdol  Neurology following  Continue Copaxone.            Marijuana abuse- (present on admission)   Assessment & Plan    Patient reports smoking daily which says helps her appetite. Cessation counseling provided.         Chronic pain syndrome   Assessment & Plan    Continue toradol, ordered lidoderm patches for b/l hip pain. She says still has pain and have added low dose morphine.          Patient plan of care discussed at multidisplinary team rounds and with patient and R.N at beside.      Reviewed items::  Labs reviewed, Medications reviewed, Radiology images reviewed and EKG reviewed  Foss catheter::  No Foss  DVT prophylaxis pharmacological::  Heparin  DVT prophylaxis - mechanical:  SCDs        "

## 2017-10-07 NOTE — PROGRESS NOTES
Pt understood case about her kids, per pt neighbor will stay with kids at all times.     Vitals were retaken again due to pt been angry and anxious about her situation.

## 2017-10-08 PROCEDURE — A9270 NON-COVERED ITEM OR SERVICE: HCPCS | Performed by: HOSPITALIST

## 2017-10-08 PROCEDURE — 700102 HCHG RX REV CODE 250 W/ 637 OVERRIDE(OP): Performed by: FAMILY MEDICINE

## 2017-10-08 PROCEDURE — 700102 HCHG RX REV CODE 250 W/ 637 OVERRIDE(OP): Performed by: HOSPITALIST

## 2017-10-08 PROCEDURE — 700111 HCHG RX REV CODE 636 W/ 250 OVERRIDE (IP): Performed by: PSYCHIATRY & NEUROLOGY

## 2017-10-08 PROCEDURE — 770020 HCHG ROOM/CARE - TELE (206)

## 2017-10-08 PROCEDURE — 700105 HCHG RX REV CODE 258: Performed by: PSYCHIATRY & NEUROLOGY

## 2017-10-08 PROCEDURE — 700101 HCHG RX REV CODE 250: Performed by: HOSPITALIST

## 2017-10-08 PROCEDURE — 700111 HCHG RX REV CODE 636 W/ 250 OVERRIDE (IP): Performed by: HOSPITALIST

## 2017-10-08 PROCEDURE — 99232 SBSQ HOSP IP/OBS MODERATE 35: CPT | Performed by: FAMILY MEDICINE

## 2017-10-08 PROCEDURE — A9270 NON-COVERED ITEM OR SERVICE: HCPCS | Performed by: FAMILY MEDICINE

## 2017-10-08 PROCEDURE — 700102 HCHG RX REV CODE 250 W/ 637 OVERRIDE(OP): Performed by: INTERNAL MEDICINE

## 2017-10-08 PROCEDURE — A9270 NON-COVERED ITEM OR SERVICE: HCPCS | Performed by: INTERNAL MEDICINE

## 2017-10-08 PROCEDURE — 700105 HCHG RX REV CODE 258: Performed by: INTERNAL MEDICINE

## 2017-10-08 RX ADMIN — DIPHENHYDRAMINE HYDROCHLORIDE 25 MG: 50 INJECTION, SOLUTION INTRAMUSCULAR; INTRAVENOUS at 07:30

## 2017-10-08 RX ADMIN — SODIUM CHLORIDE: 9 INJECTION, SOLUTION INTRAVENOUS at 18:43

## 2017-10-08 RX ADMIN — ZOLPIDEM TARTRATE 5 MG: 5 TABLET, FILM COATED ORAL at 21:19

## 2017-10-08 RX ADMIN — SODIUM CHLORIDE 1000 MG: 9 INJECTION, SOLUTION INTRAVENOUS at 08:38

## 2017-10-08 RX ADMIN — MORPHINE SULFATE 2 MG: 4 INJECTION INTRAVENOUS at 06:53

## 2017-10-08 RX ADMIN — OXYCODONE HYDROCHLORIDE 10 MG: 10 TABLET ORAL at 17:49

## 2017-10-08 RX ADMIN — LIDOCAINE 2 PATCH: 50 PATCH TOPICAL at 09:58

## 2017-10-08 RX ADMIN — MORPHINE SULFATE 2 MG: 4 INJECTION INTRAVENOUS at 11:01

## 2017-10-08 RX ADMIN — DIPHENHYDRAMINE HYDROCHLORIDE 25 MG: 50 INJECTION, SOLUTION INTRAMUSCULAR; INTRAVENOUS at 19:46

## 2017-10-08 RX ADMIN — CYCLOBENZAPRINE 10 MG: 10 TABLET, FILM COATED ORAL at 05:44

## 2017-10-08 RX ADMIN — MORPHINE SULFATE 2 MG: 4 INJECTION INTRAVENOUS at 03:08

## 2017-10-08 RX ADMIN — CYCLOBENZAPRINE 10 MG: 10 TABLET, FILM COATED ORAL at 13:46

## 2017-10-08 RX ADMIN — OXYCODONE HYDROCHLORIDE 10 MG: 10 TABLET ORAL at 21:18

## 2017-10-08 RX ADMIN — DIPHENHYDRAMINE HYDROCHLORIDE 25 MG: 50 INJECTION, SOLUTION INTRAMUSCULAR; INTRAVENOUS at 00:54

## 2017-10-08 ASSESSMENT — ENCOUNTER SYMPTOMS
BLURRED VISION: 0
WEAKNESS: 1
SENSORY CHANGE: 1
SHORTNESS OF BREATH: 0
DIZZINESS: 0
HEADACHES: 0
FEVER: 0
CHILLS: 0
ABDOMINAL PAIN: 0
WHEEZING: 0
SPEECH CHANGE: 0
FOCAL WEAKNESS: 1
NAUSEA: 0
COUGH: 0
VOMITING: 0
HEARTBURN: 0
SORE THROAT: 0
DIARRHEA: 0

## 2017-10-08 ASSESSMENT — PAIN SCALES - GENERAL
PAINLEVEL_OUTOF10: 3
PAINLEVEL_OUTOF10: 7
PAINLEVEL_OUTOF10: 3
PAINLEVEL_OUTOF10: 8
PAINLEVEL_OUTOF10: 8
PAINLEVEL_OUTOF10: 7
PAINLEVEL_OUTOF10: 5
PAINLEVEL_OUTOF10: 4
PAINLEVEL_OUTOF10: 0
PAINLEVEL_OUTOF10: 5
PAINLEVEL_OUTOF10: 5
PAINLEVEL_OUTOF10: 7
PAINLEVEL_OUTOF10: 7
PAINLEVEL_OUTOF10: 8
PAINLEVEL_OUTOF10: 4

## 2017-10-08 NOTE — PROGRESS NOTES
Report received at bedside, assumed care. Pt is resting in bed. A&O x 4. Pt complaints pain. Comfort measures provided.  No other concerns, complains or distress. Tele box on. Chart reviewed and POC updated with patient. Bed in lowest position and call light within reach.

## 2017-10-08 NOTE — PROGRESS NOTES
[facility] Hospitalist Progress Note    Date of Service: 10/8/2017    Chief Complaint  27 y.o. female admitted 10/4/2017 with MS exacerbation    Interval Problem Update  MS - feels stronger, less numbness  Pain - better controlled    Consultants/Specialty  Neuro - Spivack    Disposition  Complete steroid course        Review of Systems   Constitutional: Positive for malaise/fatigue. Negative for chills and fever.   HENT: Negative for sore throat.    Eyes: Negative for blurred vision.   Respiratory: Negative for cough, shortness of breath and wheezing.    Cardiovascular: Negative for chest pain and leg swelling.   Gastrointestinal: Negative for abdominal pain, diarrhea, heartburn, nausea and vomiting.   Genitourinary: Negative for dysuria.   Neurological: Positive for sensory change, focal weakness and weakness. Negative for dizziness, speech change and headaches.      Physical Exam  Laboratory/Imaging   Hemodynamics  Temp (24hrs), Av.7 °C (98 °F), Min:36.3 °C (97.3 °F), Max:37.2 °C (99 °F)   Temperature: 36.4 °C (97.5 °F)  Pulse  Av.1  Min: 68  Max: 116    Blood Pressure: 140/105 (rn 's notified)      Respiratory      Respiration: 16, Pulse Oximetry: 96 %             Fluids    Intake/Output Summary (Last 24 hours) at 10/08/17 1403  Last data filed at 10/08/17 0600   Gross per 24 hour   Intake             1650 ml   Output                0 ml   Net             1650 ml       Nutrition  Orders Placed This Encounter   Procedures   • DIET ORDER     Standing Status:   Standing     Number of Occurrences:   1     Order Specific Question:   Diet:     Answer:   Regular [1]     Physical Exam   Constitutional: She is oriented to person, place, and time. She appears well-developed and well-nourished.   HENT:   Head: Normocephalic and atraumatic.   Eyes: Conjunctivae are normal. Pupils are equal, round, and reactive to light.   Neck: No tracheal deviation present. No thyromegaly present.   Cardiovascular: Normal rate and  regular rhythm.    Pulmonary/Chest: Effort normal and breath sounds normal.   Abdominal: Soft. Bowel sounds are normal. She exhibits no distension. There is no tenderness.   Lymphadenopathy:     She has no cervical adenopathy.   Neurological: She is alert and oriented to person, place, and time. No cranial nerve deficit.   MMT RUE and RLE 4+/5, LUE and LLE 5/5   Skin: Skin is warm and dry.   Nursing note and vitals reviewed.                               Assessment/Plan     * Multiple sclerosis exacerbation (CMS-HCC)   Assessment & Plan    IV Solumedrol x 5d            Marijuana abuse- (present on admission)   Assessment & Plan    Watch for withdrawal symptoms        Chronic pain syndrome   Assessment & Plan    Pain control            Reviewed items::  Radiology images reviewed, Labs reviewed and Medications reviewed  Foss catheter::  No Foss  DVT prophylaxis pharmacological::  Enoxaparin (Lovenox)  Ulcer Prophylaxis::  No

## 2017-10-08 NOTE — CARE PLAN
Problem: Safety  Goal: Will remain free from injury  Outcome: PROGRESSING AS EXPECTED  Call light within reach, hourly rounding in practice.    Problem: Knowledge Deficit  Goal: Knowledge of disease process/condition, treatment plan, diagnostic tests, and medications will improve  Outcome: PROGRESSING AS EXPECTED  Pt educated on POC, all questions answered in regards to disease process, treatment and diet. Pt verbalize understanding and voice no further questions at this time.    Problem: Pain Management  Goal: Pain level will decrease to patient's comfort goal  Outcome: PROGRESSING AS EXPECTED  Pt receiving prn pain medication as ordered per MD. Pt also using distraction to decrease pain.

## 2017-10-08 NOTE — PROGRESS NOTES
Pt off floor with wheel chair with RN at 16:15.  agreed pt stay with her sister for 15 mins Pt verbalized she would have two cigarettes smoked when she was down to the ground floor. Pt able to use the wheelchair freely and she refused RN stay with her during her ground stay. Pt reeducated about the not smoking in the ground floor. Pt consistently refused the education. Pt non-compliant to education. Pt back to floor 16:23. Pt appeared to have smell as she smoked marijuana not the cigarettes.

## 2017-10-08 NOTE — PROGRESS NOTES
"Pt wants be independently off the [facility]. Pt provided RN company. Pt verbalized no to the offer. Pt verbalized \" I don't like this fucking place.\". Pt reoffered the company to be off the floor but pt still refused. Pt had confrontation with MD with the same issue this morning. Pt strongly insisted off the tele monitor and wanted to be off the floor by herself. However when company offered, she refused.   "

## 2017-10-08 NOTE — PROGRESS NOTES
Pt wanted to be off the floor and to smoke cigarettes. Pt educated the campus is non-smoking campus. Pt was angry that she could not smoke. She verbalized [place] [facility] [place] Pt had her sister support in the room backing her up to rationalize her to smoke. Pt reeducated the non-smoking campus policy. Pt refused. Pt called for charge RN. Charge RN intervened.Charge RN discussed the event with .  order received. Pt was allowed to stay with her sister 15 mins at the ground floor. Pt was simran to roll her ground floor with RN.

## 2017-10-08 NOTE — CARE PLAN
Problem: Safety  Goal: Will remain free from injury  Outcome: PROGRESSING AS EXPECTED  Refuses bed alarm, no falls    Problem: Pain Management  Goal: Pain level will decrease to patient's comfort goal  Outcome: PROGRESSING SLOWER THAN EXPECTED  Pain meds given

## 2017-10-09 VITALS
OXYGEN SATURATION: 96 % | WEIGHT: 97 LBS | HEART RATE: 77 BPM | DIASTOLIC BLOOD PRESSURE: 103 MMHG | SYSTOLIC BLOOD PRESSURE: 131 MMHG | TEMPERATURE: 98.8 F | HEIGHT: 63 IN | RESPIRATION RATE: 18 BRPM | BODY MASS INDEX: 17.19 KG/M2

## 2017-10-09 PROCEDURE — 700111 HCHG RX REV CODE 636 W/ 250 OVERRIDE (IP): Performed by: PSYCHIATRY & NEUROLOGY

## 2017-10-09 PROCEDURE — 99239 HOSP IP/OBS DSCHRG MGMT >30: CPT | Performed by: FAMILY MEDICINE

## 2017-10-09 PROCEDURE — 700102 HCHG RX REV CODE 250 W/ 637 OVERRIDE(OP): Performed by: INTERNAL MEDICINE

## 2017-10-09 PROCEDURE — 700105 HCHG RX REV CODE 258: Performed by: PSYCHIATRY & NEUROLOGY

## 2017-10-09 PROCEDURE — A9270 NON-COVERED ITEM OR SERVICE: HCPCS | Performed by: INTERNAL MEDICINE

## 2017-10-09 RX ORDER — OXYCODONE HYDROCHLORIDE 10 MG/1
10 TABLET ORAL
Qty: 30 TAB | Refills: 0 | Status: SHIPPED | OUTPATIENT
Start: 2017-10-09 | End: 2017-10-12

## 2017-10-09 RX ORDER — CYCLOBENZAPRINE HCL 10 MG
10 TABLET ORAL 3 TIMES DAILY PRN
Qty: 30 TAB | Refills: 0 | Status: SHIPPED | OUTPATIENT
Start: 2017-10-09 | End: 2018-12-02

## 2017-10-09 RX ADMIN — SODIUM CHLORIDE 1000 MG: 9 INJECTION, SOLUTION INTRAVENOUS at 04:57

## 2017-10-09 RX ADMIN — OXYCODONE HYDROCHLORIDE 10 MG: 10 TABLET ORAL at 04:57

## 2017-10-09 RX ADMIN — OXYCODONE HYDROCHLORIDE 10 MG: 10 TABLET ORAL at 01:46

## 2017-10-09 ASSESSMENT — PAIN SCALES - GENERAL
PAINLEVEL_OUTOF10: 3
PAINLEVEL_OUTOF10: 7
PAINLEVEL_OUTOF10: 7
PAINLEVEL_OUTOF10: 2

## 2017-10-09 ASSESSMENT — LIFESTYLE VARIABLES: EVER_SMOKED: NEVER

## 2017-10-09 NOTE — PROGRESS NOTES
MD stated to monitor BP and no meds needed. Pt does smoke every day (weed) and hasnt smoke since admission.

## 2017-10-09 NOTE — PROGRESS NOTES
Pt asked if she could go downstairs for fresh air. RN educated pt that she needs to come back to floor and don't smoke at all.

## 2017-10-09 NOTE — PROGRESS NOTES
BP elevated, pt stated I'm stress, I'm hungry. I need to go home. Please come back and rechek BP later. Pt also stated I'm in pain.

## 2017-10-09 NOTE — DISCHARGE INSTRUCTIONS
Discharge Instructions    Discharged to home by car with self. Discharged via walking, hospital escort: Refused.  Special equipment needed: Not Applicable    Be sure to schedule a follow-up appointment with your primary care doctor or any specialists as instructed.     Discharge Plan:   Diet Plan: Discussed  Activity Level: Discussed  Confirmed Follow up Appointment: Appointment Scheduled  Medication Reconciliation Updated: Yes  Influenza Vaccine Indication: Patient Refuses    I understand that a diet low in cholesterol, fat, and sodium is recommended for good health. Unless I have been given specific instructions below for another diet, I accept this instruction as my diet prescription.   Other diet: regular    Special Instructions: None    · Is patient discharged on Warfarin / Coumadin?   No     · Is patient Post Blood Transfusion?  No    Depression / Suicide Risk    As you are discharged from this [facility] facility, it is important to learn how to keep safe from harming yourself.    Recognize the warning signs:  · Abrupt changes in personality, positive or negative- including increase in energy   · Giving away possessions  · Change in eating patterns- significant weight changes-  positive or negative  · Change in sleeping patterns- unable to sleep or sleeping all the time   · Unwillingness or inability to communicate  · Depression  · Unusual sadness, discouragement and loneliness  · Talk of wanting to die  · Neglect of personal appearance   · Rebelliousness- reckless behavior  · Withdrawal from people/activities they love  · Confusion- inability to concentrate     If you or a loved one observes any of these behaviors or has concerns about self-harm, here's what you can do:  · Talk about it- your feelings and reasons for harming yourself  · Remove any means that you might use to hurt yourself (examples: pills, rope, extension cords, firearm)  · Get professional help from the community (Mental Health,  Substance Abuse, psychological counseling)  · Do not be alone:Call your Safe Contact- someone whom you trust who will be there for you.  · Call your local CRISIS HOTLINE 460-3595 or 498-398-5219  · Call your local Children's Mobile Crisis Response Team [place] (121) 533-8974 or www.Hyperpia  · Call the toll free National Suicide Prevention Hotlines   · National Suicide Prevention Lifeline 117-913-BOPJ (2329)  · National Hope Line Network 800-SUICIDE (940-7626)

## 2017-10-09 NOTE — PROGRESS NOTES
Pt very agitated, frustrated and emotional because she has to move to a different room. Pt was about to leave AMA because its not fair for her to be moving to a different room in the middle of the morning, she is leaving early in the AM.     RN spoke to pt, calmed her down, convinced her to stay and wait for the morning doctor to arrive and discharge her.

## 2017-10-09 NOTE — DIETARY
Nutrition Services: BMI - Pt seen for BMI <19 (= 17.18 ), ht = 160 cm , wt = 44 kg.      Pt eating ~ 50% of meals per ADL flowsheet.  Pt with pending discharge orders.    RD available PRN.

## 2017-10-09 NOTE — CARE PLAN
Problem: Pain Management  Goal: Pain level will decrease to patient's comfort goal  Outcome: PROGRESSING SLOWER THAN EXPECTED  Pain med as requested    Problem: Mobility  Goal: Risk for activity intolerance will decrease  Outcome: PROGRESSING AS EXPECTED  Walked multiple times during night time

## 2017-10-12 ENCOUNTER — OFFICE VISIT (OUTPATIENT)
Dept: MEDICAL GROUP | Facility: MEDICAL CENTER | Age: 27
End: 2017-10-12
Payer: MEDICARE

## 2017-10-12 VITALS
BODY MASS INDEX: 16.66 KG/M2 | HEART RATE: 76 BPM | WEIGHT: 94 LBS | TEMPERATURE: 98.6 F | SYSTOLIC BLOOD PRESSURE: 102 MMHG | HEIGHT: 63 IN | DIASTOLIC BLOOD PRESSURE: 62 MMHG | OXYGEN SATURATION: 98 % | RESPIRATION RATE: 14 BRPM

## 2017-10-12 DIAGNOSIS — F12.90 MARIJUANA USE: ICD-10-CM

## 2017-10-12 DIAGNOSIS — G35 MULTIPLE SCLEROSIS EXACERBATION (HCC): ICD-10-CM

## 2017-10-12 DIAGNOSIS — G89.4 CHRONIC PAIN SYNDROME: ICD-10-CM

## 2017-10-12 DIAGNOSIS — Z30.013 ENCOUNTER FOR INITIAL PRESCRIPTION OF INJECTABLE CONTRACEPTIVE: ICD-10-CM

## 2017-10-12 PROBLEM — F12.10 MARIJUANA ABUSE: Status: RESOLVED | Noted: 2017-10-06 | Resolved: 2017-10-12

## 2017-10-12 LAB
INT CON NEG: NEGATIVE
INT CON POS: POSITIVE
POC URINE PREGNANCY TEST: NORMAL

## 2017-10-12 PROCEDURE — 81025 URINE PREGNANCY TEST: CPT | Performed by: FAMILY MEDICINE

## 2017-10-12 PROCEDURE — 99203 OFFICE O/P NEW LOW 30 MIN: CPT | Mod: 25 | Performed by: FAMILY MEDICINE

## 2017-10-12 RX ORDER — MEDROXYPROGESTERONE ACETATE 150 MG/ML
150 INJECTION, SUSPENSION INTRAMUSCULAR
OUTPATIENT
Start: 2017-10-12 | End: 2018-10-07

## 2017-10-12 RX ADMIN — MEDROXYPROGESTERONE ACETATE 150 MG: 150 INJECTION, SUSPENSION INTRAMUSCULAR at 09:48

## 2017-10-12 ASSESSMENT — PATIENT HEALTH QUESTIONNAIRE - PHQ9: CLINICAL INTERPRETATION OF PHQ2 SCORE: 0

## 2017-10-12 NOTE — PROGRESS NOTES
cc: Multiple sclerosis    Subjective:     Nanci Mccormack is a 27 y.o. female presentingTo [facility]. She reports a long-standing history of multiple sclerosis. She was diagnosed when she was living in [place]. She was recently hospitalized with MS exacerbation, describes numbness and tingling in her right leg, migraines, vision changes. Her symptoms have been improving. She has an appointment with her neurologist in 2 weeks. She has tried gabapentin, amitriptyline, Cymbalta in the past for her chronic pain and neurologic symptoms, but the were either ineffective or make her very angry.   She'll also like to discuss birth control. She is on Depo-Provera injections in the past and tolerated it well. Her LMP is 9/14. Does not believe that she is pregnant. She will like to resume the Depo injections.    Review of systems:  See above.  All other systems were reviewed and are negative.      Current Outpatient Prescriptions:   •  cyclobenzaprine (FLEXERIL) 10 MG Tab, Take 1 Tab by mouth 3 times a day as needed for Muscle Spasms., Disp: 30 Tab, Rfl: 0  •  Glatiramer Acetate 20 MG/ML Solution Prefilled Syringe, Inject 20 mg as instructed every day., Disp: , Rfl:   •  Ibuprofen-Diphenhydramine Cit (ADVIL PM) 200-38 MG Tab, Take 1 Tab by mouth every bedtime., Disp: , Rfl:     Current Facility-Administered Medications:   •  medroxyPROGESTERone (DEPO-PROVERA) injection 150 mg, 150 mg, Intramuscular, Q90 DAYS, Geneva Granados M.D., 150 mg at 10/12/17 0948    Allergies   Allergen Reactions   • Pcn [Penicillins]      Hives         Past Medical History:   Diagnosis Date   • MS (multiple sclerosis) (CMS-[facility])      No past surgical history on file.  Family History   Problem Relation Age of Onset   • Hypertension Mother    • Cancer Mother    • Hypertension Father      Social History     Social History   • Marital status: Single     Spouse name: N/A   • Number of children: N/A   • Years of education: N/A     Occupational  "History   • Not on file.     Social History Main Topics   • Smoking status: Never Smoker   • Smokeless tobacco: Never Used   • Alcohol use Yes   • Drug use:      Types: Inhaled, Marijuana      Comment: daily marijuana   • Sexual activity: Not on file     Other Topics Concern   • Not on file     Social History Narrative   • No narrative on file       Objective:     Vitals: /62   Pulse 76   Temp 37 °C (98.6 °F)   Resp 14   Ht 1.6 m (5' 3\")   Wt 42.6 kg (94 lb)   LMP 09/14/2017   SpO2 98%   Breastfeeding? No   BMI 16.65 kg/m²   General: Alert, pleasant, NAD  HEENT: Normocephalic.  PERRL, EOMI, no icterus or pallor.  Conjunctivae and lids normal. External ears normal.  Neck supple.  No thyromegaly or masses palpated. No cervical or supraclavicular lymphadenopathy.  Heart: Regular rate and rhythm.  S1 and S2 normal.  No murmurs appreciated.  Respiratory: Normal respiratory effort.  Clear to auscultation bilaterally.  Abdomen: Non-distended, soft  Skin: Warm, dry, no rashes.  Musculoskeletal: Gait is normal.  Moves all extremities well.  Extremities: No leg edema.   Psych:  Affect/mood is normal, judgement is good, memory is intact, grooming is appropriate.    Clinic here pregnancy test: Negative    Assessment/Plan:     Nanci was seen today for hospital follow-up.    Diagnoses and all orders for this visit:    Multiple sclerosis exacerbation (CMS-HCC)  Continue current medications, has an appointment with neurology.    Chronic pain syndrome  Marijuana use  Advised cutting down on marijuana, patient will consider this    Encounter for initial prescription of injectable contraceptive  Urine pregnancy test was negative. Return in 3 months for a depo injection  -     POCT Pregnancy  -     medroxyPROGESTERone (DEPO-PROVERA) injection 150 mg; 1 mL by Intramuscular route every 90 days.        Return in about 1 year (around 10/12/2018) for Annual Preventative Exam.  "

## 2017-10-12 NOTE — LETTER
[facility]  Geneva Granados M.D.  [address] [facility] [address]  [place] [address]  Fax: [phone]   Authorization for Release/Disclosure of   Protected Health Information   Name: NANCI MCCORMACK : 1990 SSN: [id number]   Address: [address] [place] [address]  [place] [address] Phone:    [phone] (home)    I authorize the entity listed below to release/disclose the PHI below to:   [facility]/Geneva Granados M.D. and Geneva Granados M.D.   Provider or Entity Name:  [facility]    Address   City, [facility], [place], [place] Phone:      Fax:     Reason for request: continuity of care   Information to be released:    [  ] LAST COLONOSCOPY,  including any PATH REPORT and follow-up  [  ] LAST FIT/COLOGUARD RESULT [  ] LAST DEXA  [  ] LAST MAMMOGRAM  [  ] LAST PAP  [  ] LAST LABS [  ] RETINA EXAM REPORT  [  ] IMMUNIZATION RECORDS  [x  ] Release all info      [  ] Check here and initial the line next to each item to release ALL health information INCLUDING  _____ Care and treatment for drug and / or alcohol abuse  _____ HIV testing, infection status, or AIDS  _____ Genetic Testing    DATES OF SERVICE OR TIME PERIOD TO BE DISCLOSED: _____________  I understand and acknowledge that:  * This Authorization may be revoked at any time by you in writing, except if your health information has already been used or disclosed.  * Your health information that will be used or disclosed as a result of you signing this authorization could be re-disclosed by the recipient. If this occurs, your re-disclosed health information may no longer be protected by State or Federal laws.  * You may refuse to sign this Authorization. Your refusal will not affect your ability to obtain treatment.  * This Authorization becomes effective upon signing and will  on (date) __________.      If no date is indicated, this Authorization will  one (1) year from the signature date.    Name: Nanci Mccormack    Signature:   Date:          10/12/2017       PLEASE FAX REQUESTED RECORDS BACK TO: [phone]

## 2017-10-12 NOTE — LETTER
[facility]  Geneva Granados M.D.  [address] [facility] [address]  [place] [address]  Fax: [phone]   Authorization for Release/Disclosure of   Protected Health Information   Name: NANCI QUIGLEY : 1990 SSN: [id number]   Address: [address] [place] [address]  [place] [address] Phone:    [phone] (home)    I authorize the entity listed below to release/disclose the PHI below to:   [facility]/Geneva Granados M.D. and Geneva Granados M.D.   Provider or Entity Name:  [facility]   Address   City, [facility], [place], [place] Phone:      Fax:     Reason for request: continuity of care   Information to be released:    [  ] LAST COLONOSCOPY,  including any PATH REPORT and follow-up  [  ] LAST FIT/COLOGUARD RESULT [  ] LAST DEXA  [  ] LAST MAMMOGRAM  [  ] LAST PAP  [  ] LAST LABS [  ] RETINA EXAM REPORT  [  ] IMMUNIZATION RECORDS  [  ] Release all info      [  ] Check here and initial the line next to each item to release ALL health information INCLUDING  _____ Care and treatment for drug and / or alcohol abuse  _____ HIV testing, infection status, or AIDS  _____ Genetic Testing    DATES OF SERVICE OR TIME PERIOD TO BE DISCLOSED: _____________  I understand and acknowledge that:  * This Authorization may be revoked at any time by you in writing, except if your health information has already been used or disclosed.  * Your health information that will be used or disclosed as a result of you signing this authorization could be re-disclosed by the recipient. If this occurs, your re-disclosed health information may no longer be protected by State or Federal laws.  * You may refuse to sign this Authorization. Your refusal will not affect your ability to obtain treatment.  * This Authorization becomes effective upon signing and will  on (date) __________.      If no date is indicated, this Authorization will  one (1) year from the signature date.    Name: Nanci Quigley    Signature:   Date:     10/12/2017          PLEASE FAX REQUESTED RECORDS BACK TO: [phone]

## 2017-10-26 ENCOUNTER — OFFICE VISIT (OUTPATIENT)
Dept: NEUROLOGY | Facility: MEDICAL CENTER | Age: 27
End: 2017-10-26
Payer: MEDICARE

## 2017-10-26 VITALS
TEMPERATURE: 98.7 F | OXYGEN SATURATION: 95 % | BODY MASS INDEX: 15.95 KG/M2 | SYSTOLIC BLOOD PRESSURE: 100 MMHG | HEIGHT: 63 IN | WEIGHT: 90 LBS | DIASTOLIC BLOOD PRESSURE: 68 MMHG | HEART RATE: 104 BPM

## 2017-10-26 DIAGNOSIS — G35 MULTIPLE SCLEROSIS (HCC): ICD-10-CM

## 2017-10-26 PROCEDURE — 99212 OFFICE O/P EST SF 10 MIN: CPT

## 2017-10-26 RX ORDER — TERIFLUNOMIDE 7 MG/1
7 TABLET, FILM COATED ORAL DAILY
Qty: 30 TAB | Refills: 6 | Status: SHIPPED | OUTPATIENT
Start: 2017-10-26 | End: 2020-06-30

## 2017-10-26 RX ORDER — CYCLOBENZAPRINE HCL 10 MG
10 TABLET ORAL 3 TIMES DAILY PRN
Qty: 30 TAB | Refills: 0 | Status: SHIPPED | OUTPATIENT
Start: 2017-10-26 | End: 2018-12-02

## 2017-10-26 NOTE — PROGRESS NOTES
Neurology Progress Note  10/26/2017      Referring MD:  Dr. Granados    Patient ID:  Name:             Nanci Mccormack     YOB: 1990  Age:                 27 y.o.  female   MRN:               [id number]                                              Reason for Consult:      Follow-up of multiple sclerosis    History of Present Illness:    This 27-year-old -American lady with definite multiple sclerosis has been on Copaxone 40 mg 3 times a week and had a recent exacerbation resulted in right leg weakness requiring hospitalization and treatment with intravenous Solu-Medrol. She presents today for reevaluation of her therapy.    Review of Systems: Relates to her multiple sclerosis.  Constitutional: Denies fevers, Denies weight changes  Eyes: Denies changes in vision, no eye pain  Ears/Nose/Throat/Mouth: Denies nasal congestion or sore throat   Cardiovascular: Denies chest pain, Denies palpitations   Respiratory: Denies shortness of breath , Denies cough  Gastrointestinal/Hepatic: Denies abdominal pain, nausea, vomiting, diarrhea, constipation or GI bleeding   Genitourinary: Denies dysuria or frequency  Musculoskeletal/Rheum: Denies  joint pain and swelling, No edema  Skin: Denies rash  Neurological: Right-sided lower extremity spasticity and weakness.  Psychiatric: denies mood disorder   Endocrine: Vidhya thyroid problems  Heme/Oncology/Lymph Nodes: Denies enlarged lymph nodes, denies brusing or known bleeding disorder  All other systems were reviewed and are negative (AMA/CMS criteria)                Past Medical History:     Past Medical History:   Diagnosis Date   • MS (multiple sclerosis) (CMS-[facility])        Problems addressed this visit:    Problem List Items Addressed This Visit     None      Visit Diagnoses     Multiple sclerosis (CMS-HCC)        Relevant Medications    Teriflunomide (AUBAGIO) 7 MG Tab    Other Relevant Orders    KELVIN VIRUS PCR    COMP METABOLIC PANEL    CBC WITH DIFFERENTIAL     "      Past Surgical History:   No past surgical history on file.      Current Outpatient Medications:  Current Outpatient Prescriptions   Medication   • Teriflunomide (AUBAGIO) 7 MG Tab   • cyclobenzaprine (FLEXERIL) 10 MG Tab   • cyclobenzaprine (FLEXERIL) 10 MG Tab   • Glatiramer Acetate 20 MG/ML Solution Prefilled Syringe   • Ibuprofen-Diphenhydramine Cit (ADVIL PM) 200-38 MG Tab     Current Facility-Administered Medications   Medication Dose   • medroxyPROGESTERone (DEPO-PROVERA) injection 150 mg  150 mg       Medication Allergy:  Allergies   Allergen Reactions   • Pcn [Penicillins]      Hives         Family History:  Family History   Problem Relation Age of Onset   • Hypertension Mother    • Cancer Mother    • Hypertension Father        Social History:  Social History     Social History   • Marital status: Single     Spouse name: N/A   • Number of children: N/A   • Years of education: N/A     Occupational History   • Not on file.     Social History Main Topics   • Smoking status: Never Smoker   • Smokeless tobacco: Never Used   • Alcohol use Yes   • Drug use:      Types: Inhaled, Marijuana      Comment: daily marijuana   • Sexual activity: Not on file     Other Topics Concern   • Not on file     Social History Narrative   • No narrative on file       Physical Exam Is very thin -American lady who has a distinctly right hemiparesis with spastic gait on the right side. Upper extremity exam seems to be normal.:  Vitals:   Vitals:    10/26/17 1348   BP: 100/68   Pulse: (!) 104   Temp: 37.1 °C (98.7 °F)   SpO2: 95%   Weight: 40.8 kg (90 lb)   Height: 1.6 m (5' 3\")     General Appearance:   Weight/BMI: Body mass index is 15.94 kg/m².      Eyes:  Normal optic discs: Yes  Visual field: Normal  Pupillary responses: Normal  Extraocular movement: Normal    Cardiovascular:  Normal carotid pulses bilaterally: Yes  RRR with no MRGs: Yes  No peripheral edema, pulses intact: Yes    Musculoskeletal:  Normal gait and " station: Yes  Normal muscle strength: No weakness in the right leg.  Normal muscle tone, no atrophy: Yes  No abnormal movements: No spasticity right lower extremity    Plan:   In light of the fact she's had a breakthrough on a standardized medication I think it is worthwhile to start her on oral medication and start her on Aubagio 7 mg a day with precautions to look for KELVIN virus and obtaining a chem panel and CBC initially.    Total length of time of this visit was 20 minutes of which greater than 50% was spent counseling the patient/family and coordinating care.    Thank you for allowing me to participate in his care.    Sincerely yours,        Taz Cheung MD, PhD

## 2017-11-22 ENCOUNTER — TELEPHONE (OUTPATIENT)
Dept: NEUROLOGY | Facility: MEDICAL CENTER | Age: 27
End: 2017-11-22

## 2017-11-22 NOTE — TELEPHONE ENCOUNTER
Pt calling requesting a refill on Roxicodone for her back pain, states the muscle relaxer is not helping. Roxicodone was prescribed to her by a ER dr. Pt was informed to keep her appt with Dr. Flores for 11/29/2017 and discuss the refill options with him at that time. Pt verbalized understanding.

## 2017-11-29 ENCOUNTER — OFFICE VISIT (OUTPATIENT)
Dept: NEUROLOGY | Facility: MEDICAL CENTER | Age: 27
End: 2017-11-29
Payer: MEDICARE

## 2017-11-29 VITALS
WEIGHT: 92.2 LBS | OXYGEN SATURATION: 97 % | TEMPERATURE: 99.7 F | SYSTOLIC BLOOD PRESSURE: 100 MMHG | HEIGHT: 63 IN | HEART RATE: 73 BPM | BODY MASS INDEX: 16.34 KG/M2 | DIASTOLIC BLOOD PRESSURE: 66 MMHG

## 2017-11-29 DIAGNOSIS — G89.29 OTHER CHRONIC PAIN: ICD-10-CM

## 2017-11-29 DIAGNOSIS — G35 MULTIPLE SCLEROSIS (HCC): ICD-10-CM

## 2017-11-29 PROCEDURE — 99212 OFFICE O/P EST SF 10 MIN: CPT

## 2017-11-29 RX ORDER — OXYCODONE AND ACETAMINOPHEN 7.5; 325 MG/1; MG/1
1 TABLET ORAL EVERY 4 HOURS PRN
Qty: 60 TAB | Refills: 0 | Status: SHIPPED | OUTPATIENT
Start: 2017-11-29 | End: 2018-02-13 | Stop reason: SDUPTHER

## 2017-11-29 NOTE — PROGRESS NOTES
Neurology Progress Note  11/29/2017      Referring MD:  Dr. Granados  Patient ID:  Name:             Nanci Mccormack     YOB: 1990  Age:                 27 y.o.  female   MRN:               [id number]                                              Reason for Consult:      Follow-up: Multiple sclerosis and initiation of new medication    History of Present Illness:    This 27-year-old -American woman has documented multiple sclerosis and has been on Copaxone and has been breaking through on that and has had to have a Solu-Medrol infusion. She also carries a diagnosis of chronic pain for which she has been on a limited amount of oxycodone and largely cyclobenzaprine. She has not yet started the Aubagio at the 7 mg dose and her KELVIN virus is pending. Her laboratory studies at this point including hepatic function and CBC are normal. She's had increasing pain and some degree of weakness and so it's important to get her Aubagio as quickly as possible.    Review of Systems: Relates to her multiple sclerosis and chronic pain.  Constitutional: Denies fevers, Denies weight changes  Eyes: Denies changes in vision, no eye pain  Ears/Nose/Throat/Mouth: Denies nasal congestion or sore throat   Cardiovascular: Denies chest pain, Denies palpitations   Respiratory: Denies shortness of breath , Denies cough  Gastrointestinal/Hepatic: Denies abdominal pain, nausea, vomiting, diarrhea, constipation or GI bleeding   Genitourinary: Denies dysuria or frequency  Musculoskeletal/Rheum: Denies  joint pain and swelling, No edema  Skin: Denies rash  Neurological: She has a significant number of neurologic deficits.  Psychiatric: denies mood disorder   Endocrine: Vidhya thyroid problems  Heme/Oncology/Lymph Nodes: Denies enlarged lymph nodes, denies brusing or known bleeding disorder  All other systems were reviewed and are negative (AMA/CMS criteria)                Past Medical History:     Past Medical History:   Diagnosis  "Date   • MS (multiple sclerosis) (CMS-HCC)        Problems addressed this visit:    Problem List Items Addressed This Visit     None      Visit Diagnoses     Multiple sclerosis (CMS-HCC)        Other chronic pain        Relevant Medications    oxycodone-acetaminophen (PERCOCET) 7.5-325 MG per tablet          Past Surgical History:   No past surgical history on file.      Current Outpatient Medications:  Current Outpatient Prescriptions   Medication   • oxycodone-acetaminophen (PERCOCET) 7.5-325 MG per tablet   • cyclobenzaprine (FLEXERIL) 10 MG Tab   • Ibuprofen-Diphenhydramine Cit (ADVIL PM) 200-38 MG Tab   • Teriflunomide (AUBAGIO) 7 MG Tab   • cyclobenzaprine (FLEXERIL) 10 MG Tab   • Glatiramer Acetate 20 MG/ML Solution Prefilled Syringe     Current Facility-Administered Medications   Medication Dose   • medroxyPROGESTERone (DEPO-PROVERA) injection 150 mg  150 mg       Medication Allergy:  Allergies   Allergen Reactions   • Pcn [Penicillins]      Hives         Family History:  Family History   Problem Relation Age of Onset   • Hypertension Mother    • Cancer Mother    • Hypertension Father        Social History:  Social History     Social History   • Marital status: Single     Spouse name: N/A   • Number of children: N/A   • Years of education: N/A     Occupational History   • Not on file.     Social History Main Topics   • Smoking status: Never Smoker   • Smokeless tobacco: Never Used   • Alcohol use Yes   • Drug use:      Types: Inhaled, Marijuana      Comment: daily marijuana   • Sexual activity: Not on file     Other Topics Concern   • Not on file     Social History Narrative   • No narrative on file       Physical Exam:  Vitals:   Vitals:    11/29/17 0740   BP: 100/66   Pulse: 73   Temp: 37.6 °C (99.7 °F)   SpO2: 97%   Weight: 41.8 kg (92 lb 3.2 oz)   Height: 1.6 m (5' 3\")     General Appearance:He is a distinct right hemiparesis particularly involving the right lower extremity with some involvement of the " right upper extremity. Eye movements appear to be normal and she does appear to be in some chronic pain.  Weight/BMI: Body mass index is 16.33 kg/m².      Eyes:  Normal optic discs: Yes  Visual field: Normal  Pupillary responses: Normal  Extraocular movement: Normal    Cardiovascular:  Normal carotid pulses bilaterally: Yes  RRR with no MRGs: Yes  No peripheral edema, pulses intact: Yes    Musculoskeletal:  Normal gait and station: No right hemiparesis  Normal muscle strength: No right-sided hemiparetic with this  Normal muscle tone, no atrophy: Yes  No abnormal movements: Yes    Plan:   I'm going to start her on some pain medication for severe pain and advised her to use her Flexeril and ibuprofen and to reserve the oxycodone/APAP for severe pain. She'll call us as soon as she receives her partial and can start on that and I will let her know her KELVIN virus titers as it is available. We'll see her again as soon as possible but no fixed appointment was set at this point pending the information regarding those 2 items.    Total length of time of this visit was 20 minutes of which greater than 50% was spent counseling the patient/family and coordinating care.    Thank you for allowing me to participate in his care.    Sincerely yours,        Taz Cheung MD, PhD

## 2018-01-06 ENCOUNTER — HOSPITAL ENCOUNTER (EMERGENCY)
Facility: MEDICAL CENTER | Age: 28
End: 2018-01-06
Attending: EMERGENCY MEDICINE
Payer: MEDICARE

## 2018-01-06 VITALS
RESPIRATION RATE: 16 BRPM | HEART RATE: 95 BPM | TEMPERATURE: 96.6 F | SYSTOLIC BLOOD PRESSURE: 117 MMHG | WEIGHT: 98 LBS | HEIGHT: 63 IN | OXYGEN SATURATION: 100 % | DIASTOLIC BLOOD PRESSURE: 74 MMHG | BODY MASS INDEX: 17.36 KG/M2

## 2018-01-06 DIAGNOSIS — G43.009 MIGRAINE WITHOUT AURA AND WITHOUT STATUS MIGRAINOSUS, NOT INTRACTABLE: ICD-10-CM

## 2018-01-06 DIAGNOSIS — G35 MULTIPLE SCLEROSIS (HCC): ICD-10-CM

## 2018-01-06 PROCEDURE — 99284 EMERGENCY DEPT VISIT MOD MDM: CPT

## 2018-01-06 PROCEDURE — 96375 TX/PRO/DX INJ NEW DRUG ADDON: CPT

## 2018-01-06 PROCEDURE — 700111 HCHG RX REV CODE 636 W/ 250 OVERRIDE (IP): Performed by: EMERGENCY MEDICINE

## 2018-01-06 PROCEDURE — 96374 THER/PROPH/DIAG INJ IV PUSH: CPT

## 2018-01-06 RX ORDER — DIPHENHYDRAMINE HYDROCHLORIDE 50 MG/ML
12.5 INJECTION INTRAMUSCULAR; INTRAVENOUS ONCE
Status: COMPLETED | OUTPATIENT
Start: 2018-01-06 | End: 2018-01-06

## 2018-01-06 RX ORDER — SUMATRIPTAN 25 MG/1
25-50 TABLET, FILM COATED ORAL
Qty: 10 TAB | Refills: 0 | Status: SHIPPED | OUTPATIENT
Start: 2018-01-06 | End: 2020-01-01

## 2018-01-06 RX ORDER — KETOROLAC TROMETHAMINE 30 MG/ML
30 INJECTION, SOLUTION INTRAMUSCULAR; INTRAVENOUS ONCE
Status: DISCONTINUED | OUTPATIENT
Start: 2018-01-06 | End: 2018-01-06 | Stop reason: HOSPADM

## 2018-01-06 RX ADMIN — DIPHENHYDRAMINE HYDROCHLORIDE 12.5 MG: 50 INJECTION, SOLUTION INTRAMUSCULAR; INTRAVENOUS at 11:01

## 2018-01-06 RX ADMIN — PROCHLORPERAZINE EDISYLATE 10 MG: 5 INJECTION INTRAMUSCULAR; INTRAVENOUS at 11:01

## 2018-01-06 ASSESSMENT — PAIN SCALES - GENERAL: PAINLEVEL_OUTOF10: 7

## 2018-01-06 NOTE — DISCHARGE INSTRUCTIONS
Migraine Headache    Take ibuprofen and Imitrex immediately at the first hint of headache.  Return for sudden onset, severe headache, headache and fever or headache and weakness. Follow-up with Dr. Cheung    You had a borderline or high normal blood pressure reading today.  This does not necessarily mean you have hypertension.  Please followup with your/a primary physician for comprehensive blood pressure evaluation and yearly fasting cholesterol assessment.  BP Readings from Last 3 Encounters:   01/06/18 117/74   11/29/17 100/66   10/26/17 100/68         You have a migraine headache. Symptoms of migraines include a throbbing headache, made worse by activity. Sometimes only one side of the head hurts. Nausea, vomiting and sinus pain or stuffiness is common with migraines. Visual symptoms such as light sensitivity, blind spots, or flashing lights may also occur. Loud noises may make migraine pain worse. Migraine headaches are caused by changes in the size of the blood vessels in the head and neck. Many factors may cause this problem including:  Ø Emotional stress, lack of sleep, and menstrual periods.  Ø Alcohol and some drugs (such as birth control pills).  Ø Diet factors (fasting, caffeine, food preservatives, chocolate).  Ø Environmental factors (weather changes, bright lights, odors, smoke).  Ø Jarring motions and loud noises may also bring on a migraine. Prevention of migraines includes dealing with the trigger factors.    Treatment may require medicines for pain, inflammation, and vomiting. Injections for pain and IV fluids can be used if the headache is very severe, or if you are vomiting repeatedly. Get plenty of rest over the next 24 hours.  Lie down in a quiet, dark place and try to sleep  Medicines to prevent the blood vessel changes may be prescribed.  For people with frequent migraines, other medicines such as beta blockers and antidepressants may be helpful. Please see your caregiver if you are not  better in 1-2 days.     seek immediate medical care if:  Ø You develop a high fever, repeated vomiting, dehydration, or extreme weakness  Ø You develop fainting, worsening headache, confusion, or seizures  Ø You develop numbness or weakness of the limbs    [employer]Care® Patient Information ©2007 [employer].

## 2018-01-06 NOTE — ED NOTES
Patient states she is feeling much better.  Wants to leave.  ERP notified.  Verbalized understanding.

## 2018-01-06 NOTE — ED NOTES
Pt to triage in wheelchair  Chief Complaint   Patient presents with   • Head Ache     x 1 wk   • Hip Pain   • Back Pain   • Medication Reaction   started abagio for MS on 12/20  Pt asked to wait in lobby, pt updated on triage process and pt asked to inform RN of any changes.

## 2018-01-06 NOTE — ED PROVIDER NOTES
ED Provider Note    Scribed for Hamlet Mckeon M.D. by Roberto Carlos Berman. 1/6/2018  10:36 AM    Primary care provider: Geneva Granados M.D.  Means of arrival: Walk-in  History obtained from: Patient  History limited by: None    CHIEF COMPLAINT  Chief Complaint   Patient presents with   • Head Ache     x 1 wk   • Hip Pain   • Back Pain   • Medication Reaction       HPI  Nanci Mccormack is a 27 y.o. female with a history of MS and migraines who presents complaining of a headache that began one week ago. The headache is located on the left side and posterior aspect of her head. The headache is a 7/10 in severity. At onset the headache was similar in quality to her normal headaches, but is gradually increased in severity and has persisted which is unusual. Also, she is now experiencing light and sound sensitivity. The patient reports associated lower back pain that has recently increased in severity, increased difficulty walking from baseline secondary to new left hip weakness and mild pain, and fever of 100.2 °FThat ended 3 days ago. She is normally capable of walking without a cane, but reports her difficulty walking has  increased from baseline since the onset of the left hip pain and weakness. She denies dysuria, chills, dental pain, diaphoresis, diarrhea, or vomiting. The patient reports that she experiences right side weakness and right hip pain at baseline secondary to her MS. She began taking Aubagio for her multiple sclerosis on 11/29. She recently finished a 60 tablet prescription Roxicodone 7.5-325 mg tablets to be taken as needed for pain management that was prescribed in 11/2017. Her last MS flare-up was one year ago. She typically exerperiences falls and weakness during her MS flares. She also reports that she normally experiences one to two falls a weak secondary to weakness, but denies any recent increase in the number of falls. Her last fall was five days ago. She is followed by Dr. Cheung,  "Neurology. Also, she was admitted in 10/2017 for possible stroke. There is no chance that she is pregnant. She occasionally smokes marijuana, but denies smoking cigarettes.    REVIEW OF SYSTEMS  Pertinent positives include: headache, light and sound sensitivity, lower back pain that has recently increased in severity, increased difficulty walking from baseline secondary to new left hip weakness and mild pain, and fever of 100.2 °F.  Pertinent negatives include: dysuria, chills, dental pain, diaphoresis, diarrhea, or vomiting.  E    PAST MEDICAL HISTORY  Past Medical History:   Diagnosis Date   • MS (multiple sclerosis) (CMS-[facility])        SOCIAL HISTORY  Denies smoking cigarettes.    CURRENT MEDICATIONS    Current Outpatient Prescriptions on File Prior to Encounter   Medication Sig Dispense Refill   • oxycodone-acetaminophen (PERCOCET) 7.5-325 MG per tablet Take 1 Tab by mouth every four hours as needed for Severe Pain. 60 Tab 0   • Teriflunomide (AUBAGIO) 7 MG Tab Take 7 mg by mouth every day. (Patient not taking: Reported on 11/29/2017) 30 Tab 6   • cyclobenzaprine (FLEXERIL) 10 MG Tab Take 1 Tab by mouth 3 times a day as needed for Muscle Spasms. 30 Tab 0   • Glatiramer Acetate 20 MG/ML Solution Prefilled Syringe Inject 20 mg as instructed every day.     • Ibuprofen-Diphenhydramine Cit (ADVIL PM) 200-38 MG Tab Take 1 Tab by mouth every bedtime.         ALLERGIES  Allergies   Allergen Reactions   • Pcn [Penicillins]      Hives         PHYSICAL EXAM  VITAL SIGNS: /74   Pulse 96   Temp 35.9 °C (96.6 °F)   Resp 16   Ht 1.594 m (5' 2.75\")   Wt 44.5 kg (98 lb)   SpO2 100%   BMI 17.50 kg/m²  Reviewed andNormal  Constitutional :  Well developed, Well nourished, well appearing  ENT: No TMJ crepitus. No intraoral erythema or exudate. No tooth tenderness. Face symmetric..   Eyes: pupils reactive without eye discharge nor conjunctival hyperemia.  Neck: Normal range of motion, No tenderness, Supple, No stridor. "   Cardiovascular: Regular rhythm, No murmurs, No rubs, No gallops.  No cyanosis.   Respiratory: Clear to auscultation bilaterally.  Abdomen:  Soft, nontender  Skin: Warm, dry, no erythema, no rash.   Neurological: Hip flexion, extension, knee flexion and extension, extensor hallucis longus and ankle plantar flexion are 5/5 left and 4 of 5 right.  Sensation is intact to light touch in the both legs.  . Deep tendon reflexes symmetric patellar and Achilles  Musculoskeletal: no limb deformities.    DIFFERENTIALS  migraine headache, tension headache, MS flare    RADIOLOGY:  MRI brain from October reviewed and demonstrates multiple plaques consistent with multiple sclerosis        INTERVENTIONS:  diphenhydrAMINE (BENADRYL) injection 12.5 mg (12.5 mg Intravenous Given 1/6/18 1101)       Response:Patient felt anxious and dysphoric after Benadryl but that gradually resolved and her headache nearly completely resolved just with Benadryl.    ED COURSE:  10:36 AM - Patient seen and examined at bedside. Patient will be treated with ketorolac injection 30 mg, prochlorperazine injection 10 mg, and diphenhydramine injection 12.5 mg for her symptoms.    11:18 AM Recheck: Patient re-evaluated at beside. Patient reports feeling worse after receiving only her Benadryl. She is very anxious and she would like to leave. Discussed patient's condition and treatment plan.    11:18 AM Paged Neurology.    11:48 AM - Re-examined; The patient is resting in bed and would like to go home. I discussed plans for discharge with a prescription for Imitrex. She was given a referral to her primary care and instructed to return to the ED if her symptoms worsen. Patient understands and agrees.    Case discussed with neurology in the pharmacist who felt that her multiple sclerosis medication is unlikely to be causing her headache. Neurology felt that he should treat migraine and send the patient to follow up with Dr. Cheung..    MEDICAL DECISION  MAKING:  This patient presents with an apparent migraine headache and a history of MS. There is no history of head trauma. There was no thunderclap headache to suggest subarachnoid hemorrhage. She's had a recent MRI brain. There is no definite MS flare except for her reported left-sided weakness at the hip. Hip flexion however is 5 of 5 on the left and still much more robust than on the right where she has a permanent deficit. After Benadryl patient stated she wanted to go home and wanted no further medications.    DISPOSITION:Patient will be discharged home in stable condition.    The patient will return for new or worsening symptoms and is stable at the time of discharge.      PLAN:  New Prescriptions    SUMATRIPTAN (IMITREX) 25 MG TAB TABLET    Take 1-2 Tabs by mouth Once PRN for Migraine for up to 1 dose.     Ibuprofen at the start of headache  Migraine handout given  Return for sudden severe headache, headache and neurologic weakness headache and fever    Taz Cheung M.D.  [address] [place] [id number]  [phone]    Schedule an appointment as soon as possible for a visit        CONDITION:  Good.    FINAL IMPRESSION:  1. Migraine without aura and without status migrainosus, not intractable    2. Multiple sclerosis (CMS-[facility])         I, Roberto Carlos Berman (Scribe), am scribing for, and in the presence of, Hamlet Mckeon M.D..    Electronically signed by: Roberto Carlos Berman (Scribe), 1/6/2018    Electronically signed by: Roberto Carlos Berman, 1/6/2018    The note accurately reflects work and decisions made by me.  Hamlet Mckeon  1/6/2018  12:14 PM

## 2018-01-06 NOTE — ED NOTES
Reviewed discharge instructions with patient, she verbalized understanding, reviewed rx.  Patient has a ride home.  Able to ambulate out.

## 2018-02-13 ENCOUNTER — OFFICE VISIT (OUTPATIENT)
Dept: NEUROLOGY | Facility: MEDICAL CENTER | Age: 28
End: 2018-02-13
Payer: MEDICARE

## 2018-02-13 VITALS
OXYGEN SATURATION: 100 % | HEIGHT: 63 IN | DIASTOLIC BLOOD PRESSURE: 60 MMHG | WEIGHT: 94.5 LBS | SYSTOLIC BLOOD PRESSURE: 94 MMHG | TEMPERATURE: 97.9 F | BODY MASS INDEX: 16.74 KG/M2 | HEART RATE: 84 BPM

## 2018-02-13 DIAGNOSIS — M54.50 CHRONIC LOW BACK PAIN WITHOUT SCIATICA, UNSPECIFIED BACK PAIN LATERALITY: ICD-10-CM

## 2018-02-13 DIAGNOSIS — R25.2 SPASTICITY: ICD-10-CM

## 2018-02-13 DIAGNOSIS — G35 MULTIPLE SCLEROSIS (HCC): ICD-10-CM

## 2018-02-13 DIAGNOSIS — G89.29 CHRONIC LOW BACK PAIN WITHOUT SCIATICA, UNSPECIFIED BACK PAIN LATERALITY: ICD-10-CM

## 2018-02-13 DIAGNOSIS — F32.1 MODERATE SINGLE CURRENT EPISODE OF MAJOR DEPRESSIVE DISORDER (HCC): ICD-10-CM

## 2018-02-13 PROCEDURE — 99215 OFFICE O/P EST HI 40 MIN: CPT | Performed by: PSYCHIATRY & NEUROLOGY

## 2018-02-13 RX ORDER — CHOLESTYRAMINE 4 G/9G
1 POWDER, FOR SUSPENSION ORAL DAILY
Qty: 33 EACH | Refills: 0 | Status: SHIPPED | OUTPATIENT
Start: 2018-02-13 | End: 2018-12-02

## 2018-02-13 RX ORDER — OXYCODONE AND ACETAMINOPHEN 7.5; 325 MG/1; MG/1
1 TABLET ORAL EVERY 4 HOURS PRN
Qty: 60 TAB | Refills: 0 | Status: SHIPPED | OUTPATIENT
Start: 2018-02-13 | End: 2018-05-13

## 2018-02-13 RX ORDER — BACLOFEN 10 MG/1
10 TABLET ORAL 3 TIMES DAILY
Qty: 90 TAB | Refills: 1 | Status: SHIPPED | OUTPATIENT
Start: 2018-02-13 | End: 2018-12-02

## 2018-02-13 NOTE — PROGRESS NOTES
CC: Multiple Sclerosis       HPI:    Nanci Mccormack is a 28 y.o. female with a history of migraines who presents today in neurologic follow up for multiple sclerosis. She is a former patient of Dr. Cheung's who saw her on 11/29/2017. She is unaccompanied to today's visit.     Ms. Mccormack had previously been on Copaxone, bu had breakthrough disease according to his notes, and was switched to Aubagio. She has been having significant depression symptoms since she started the medication and would like to go off of the medication. She feels very emotional since starting Aubagio, and is tearful during the interview. She reports being very depressed, isolating herself, which is making it difficult to take care of her children. She denies any suicidal ideations.     She tells me that she was initially diagnosed with MS in 2012 after developing right side weakness during her second pregnancy. She was living in [place], [place], at the time, and treated at [facility]. She had similar symptoms during her first pregnancy in 2007, but tells me that this was ignored at the time as she was only [age] years old and was not taken seriously. In 2012, during her second pregnancy, she was hospitalized for her symptoms. After she delivered her child, she had an MRI that showed brain lesions concerning for MS. She subsequently had a lumbar puncture and was diagnosed with MS. Medical records are not available for my review today.     In March or April of 2012, she was found by her friend in her house having collapsed with her young child on top of her with right arm and leg weakness. She was admitted to the hospital for 2 weeks and was in a wheelchair for 9 1/2 months. She also had bowel incontinence at the time.     Today she reports poorly localizable chronic pain in her lower back, radiating somewhat to the right side. She describes it as a stabbing pain. If she stands too fast or steps wrong, this makes the pain worse. She tells  me that she has bene using a cane/walker. There is an MRI of the right hip from 5/27/2017 that did not show any abnormality. She has never had a lumbar spine MRI. Dr. Cheung had prescribed her Percocet #60 for this chronic pain.         Prior ER visits at [facility]:   8/10/2015 - seen for headache - Treated with IV Dilauded  9/14/2015 - seen for ear cyst - given Rx for pain medication.  12/19/2015 - Scalp abscess - treated and Rx'ed Percocet   1/11/2016 - Headache Amitryptiline  3/18/2016 - Migraine - Fioracet, decadron  5/22/16 - Foreign body   10/22/2016 - Headache - Zofran, Norco  2/17/2017 - Dental pain - Rx'ed Norco  5/26/2017 - Right hip pain  7/20/2017 - Treated with pain medication - given Rx to go home on  10/4/2017 - Headache - morphine, Zofran    Diagnosed: 2012  Symptoms - initial presentation. Right sided weakness.  Prior Treatments: Copaxone until October 2017. Aubagio November 2017.  [place], [place]. [facility]   Lumbar puncture: Yes  Outside records unavailable today.    ROS:   Constitutional: No fevers or chills. +fatigue   Eyes: No blurry vision or eye pain.  ENT: No dysphagia or hearing loss.  Respiratory: No cough or shortness of breath.  Cardiovascular: No chest pain or palpitations.  GI: No nausea, vomiting, or diarrhea. + constipation.  : No urinary incontinence or dysuria.  Musculoskeletal: No joint swelling or arthralgias.  Skin: No skin rashes.  Neuro: + gets migraine headaches, denies dizziness, or tremors.  Endocrine: No heat or cold intolerance. No polydipsia or polyuria.  Psych: + depression, denies SI/HI.  Heme/Lymph: No easy bruising or swollen lymph nodes.  All other review of systems were reviewed and were negative.     Past Medical History:   Past Medical History:   Diagnosis Date   • MS (multiple sclerosis) (CMS-HCC)        Past Surgical History:   Past Surgical History:   Procedure Laterality Date   • CYST EXCISION Right     Ear       Social History:   Social History      Social History   • Marital status: Single     Spouse name: N/A   • Number of children: N/A   • Years of education: N/A     Occupational History   • Not on file.     Social History Main Topics   • Smoking status: Never Smoker   • Smokeless tobacco: Never Used   • Alcohol use Yes      Comment: Socially   • Drug use: Yes     Types: Inhaled, Marijuana      Comment: daily marijuana   • Sexual activity: Not on file     Other Topics Concern   • Not on file     Social History Narrative   • No narrative on file       Family Hx:   Family History   Problem Relation Age of Onset   • Hypertension Mother    • Cancer Mother      cervical   • No Known Problems Sister    • No Known Problems Brother    • Cancer Maternal Aunt      Leiomyosarcoma   • No Known Problems Brother    • No Known Problems Sister    • No Known Problems Daughter    • No Known Problems Daughter        Current Medications:   Current Outpatient Prescriptions:   •  cholestyramine (QUESTRAN) 4 g packet, Take 4 g by mouth every day., Disp: 33 Each, Rfl: 0  •  baclofen (LIORESAL) 10 MG Tab, Take 1 Tab by mouth 3 times a day., Disp: 90 Tab, Rfl: 1  •  oxyCODONE-acetaminophen (PERCOCET) 7.5-325 MG per tablet, Take 1 Tab by mouth every four hours as needed for Severe Pain for up to 60 doses., Disp: 60 Tab, Rfl: 0  •  SUMAtriptan (IMITREX) 25 MG Tab tablet, Take 1-2 Tabs by mouth Once PRN for Migraine for up to 1 dose., Disp: 10 Tab, Rfl: 0  •  Teriflunomide (AUBAGIO) 7 MG Tab, Take 7 mg by mouth every day., Disp: 30 Tab, Rfl: 6  •  Ibuprofen-Diphenhydramine Cit (ADVIL PM) 200-38 MG Tab, Take 1 Tab by mouth every bedtime., Disp: , Rfl:   •  cyclobenzaprine (FLEXERIL) 10 MG Tab, Take 1 Tab by mouth 3 times a day as needed. (Patient not taking: Reported on 2/13/2018), Disp: 30 Tab, Rfl: 0  •  cyclobenzaprine (FLEXERIL) 10 MG Tab, Take 1 Tab by mouth 3 times a day as needed for Muscle Spasms. (Patient not taking: Reported on 2/13/2018), Disp: 30 Tab, Rfl: 0  •   "Glatiramer Acetate 20 MG/ML Solution Prefilled Syringe, Inject 20 mg as instructed every day., Disp: , Rfl:     Current Facility-Administered Medications:   •  medroxyPROGESTERone (DEPO-PROVERA) injection 150 mg, 150 mg, Intramuscular, Q90 DAYS, Geneva Granados M.D., 150 mg at 10/12/17 0948    Allergies:   Allergies   Allergen Reactions   • Pcn [Penicillins]      Hives           Physical Exam:   Ambulatory Vitals  Vitals  Blood Pressure: (!) 94/60  Temperature: 36.6 °C (97.9 °F)  Pulse: 84  Pulse Oximetry: 100 %  Height: 160 cm (5' 2.99\")  Weight: 42.9 kg (94 lb 8 oz)  Encounter Vitals  Temperature: 36.6 °C (97.9 °F)  Temp Source: Temporal  Blood Pressure: (!) 94/60  BP Location: Left, Upper Arm  Patient BP Position: Sitting  Pulse: 84  Pulse Oximetry: 100 %  Weight: 42.9 kg (94 lb 8 oz)  How Weight Obtained: Stand Up Scale  Height: 160 cm (5' 2.99\")  BMI (Calculated): 16.74    Constitutional: Thin  female. Appears stated age. Wearing false eyelashes.  Cardiovascular: RRR, with no murmurs, rubs or gallops. No carotid bruits. No peripheral edema, pedal pulses intact.   Respiratory: Lungs CTA B/L, no W/R/R.   Skin: Warm, dry, intact. No rashes observed.  Eyes:    Funduscopic: Optic discs flat with no evidence of papilledema or pallor. Normal posterior segments.  Neurologic:   Mental Status: Awake, alert, oriented x 3.   Speech: Fluent with normal prosody.   Memory: Able to recall 3 words at 1 minute and 5 minutes. Able to recall recent and remote events accurately.    Concentration: Attentive. Able to focus on history and follow multi-step commands.   Fund of Knowledge: Appropriate.   Cranial Nerves:    CN II: PERRL. No afferent pupillary defect.    CN III, IV, VI: Good eye closure, EOMI.     CN V: Facial sensation intact and symmetric.     CN VII: No facial asymmetry.     CN VIII: Hearing intact.     CN IX and X: Palate elevates symmetrically. Normal gag reflex.    CN XI: Symmetric shoulder shrug. " "    CN XII: Tongue midline.    Sensory: Decreased temperature and vibration on the right lower extremity compared to the left.    Coordination: No evidence of past-pointing on finger to nose testing, no dysdiadochokinesia. Heel to shin intact.    DTR's: Hyperpathic.    Babinski: Toes downgoing bilaterally.   Romberg: Negative.   Movements: Shivering.   Musculoskeletal:    Strength: 4/5 in the right upper and lower extremity throughout. 5/5 in the left upper and lower extremities.   Gait: Steady, narrow based.    Tone: Normal bulk and tone.   Joints: No swelling.     Imaging:   MRI of the brain w/wo contrast from 10/5/2017  1.  Multiple periventricular white matter lesions more prominent posteriorly highly suspicious for demyelinating process such as multiple sclerosis. No MRI scan of the brain is available for direct comparison.  2.  No \"active\" enhancing lesions in the brain parenchyma.    Assessment/Plan:  Chronic low back pain without sciatica  Ms. Mccormack is a 27 yo F with complaints of chronic back pain \"stabbing\" in quality, prior right hip MRI was negative. During our conversation regarding her pain management strategy, I suggested we avoid narcotic medications due to their addictive potential, and she became very angry, suggesting that I do not understand what her pain is like, and that she is only in this position because Dr. Cheung had been the only one who would prescribe her this medication, and he has passed away. She was not receptive to alternative suggestions for her pain, and stated that she had tried multiple different medications including Lyrica, gabapentin, and Amitryptiline, all of which gave her side effects. I explained to her that I do not endorse chronic narcotic medications as a pain strategy. The most pressing issue is to better understand why she is having pain to begin with. We ultimately agreed that I would prescribe her a similar amount of Percocet to what Dr. Cheung had prescribed " (60 tablets for 3 months), but she would have to sign a pain contract and could not receive narcotic medications by any other prescriber but me. I made it clear that this would be a one time supply, and three months should be sufficient time for her to get into a pain management clinic. After these three months, I will not supply further narcotics.     Plan:  1. Percocet #60 - 3 month supply with zero refills.   2. Referral to pain clinic.   3. MRI of the lumbar spine w/o contrast to further understand etiology.     Multiple sclerosis (CMS-HCC)  Ms. Mccormack is a 27 yo F diagnosed with MS after acute right arm and leg weakness in 2012, brain MRI showing typical MS lesions, and lumbar puncture. Her outside medical records are not available for my review at today's visit, but I have reviewed her brain MRI from 10/5/2017, and agree that the lesions seen do appear typical for MS. She was previously treated with Copaxone then switched to Aubagio due to breakthrough disease, but has developed significant depression. I would like her to stop the Aubagio and undergo the rapid elimination protocol with cholestyramine. I explained to her that otherwise, the drug may remain in her body much longer, up to 2 years.     Plan:  1. Stop Aubagio  2. Cholestyramine 4mg three times a day for 11 days. Side effects reviewed with the patient including constipation.  3. She will return in 2 weeks to follow up and further discuss her treatment options.    Moderate single current episode of major depressive disorder (CMS-HCC)  Association between starting Aubagio and start of depression. Will stop Aubagio and monitor her. If no resolution after stopping drug, may treat with antidepressants. No SI. See above.    Spasticity  Dr. Cheung had prescribed Flexeril, which she had taken with partial relief. She feels marijuana helps better with her spasticity.       Greater than 50% of this 40 minute face to face follow up encounter was devoted to  disease state counseling on Multiple Sclerosis and coordination of care. During today's encounter we discussed available treatment options and their individual side effect profiles. (see above assessment and plan for further details of discussion).

## 2018-02-14 PROBLEM — F32.1 MODERATE SINGLE CURRENT EPISODE OF MAJOR DEPRESSIVE DISORDER (HCC): Status: ACTIVE | Noted: 2018-02-14

## 2018-02-14 PROBLEM — G89.29 CHRONIC LOW BACK PAIN WITHOUT SCIATICA: Status: ACTIVE | Noted: 2018-02-14

## 2018-02-14 PROBLEM — R25.2 SPASTICITY: Status: ACTIVE | Noted: 2018-02-14

## 2018-02-14 PROBLEM — M54.50 CHRONIC LOW BACK PAIN WITHOUT SCIATICA: Status: ACTIVE | Noted: 2018-02-14

## 2018-02-15 NOTE — ASSESSMENT & PLAN NOTE
Dr. Cheung had prescribed Flexeril, which she had taken with partial relief. She feels marijuana helps better with her spasticity.

## 2018-02-15 NOTE — ASSESSMENT & PLAN NOTE
"Ms. Mccormack is a 29 yo F with complaints of chronic back pain \"stabbing\" in quality, prior right hip MRI was negative. During our conversation regarding her pain management strategy, I suggested we avoid narcotic medications due to their addictive potential, and she became very angry, suggesting that I do not understand what her pain is like, and that she is only in this position because Dr. Cheung had been the only one who would prescribe her this medication, and he has passed away. She was not receptive to alternative suggestions for her pain, and stated that she had tried multiple different medications including Lyrica, gabapentin, and Amitryptiline, all of which gave her side effects. I explained to her that I do not endorse chronic narcotic medications as a pain strategy. The most pressing issue is to better understand why she is having pain to begin with. We ultimately agreed that I would prescribe her a similar amount of Percocet to what Dr. Cheung had prescribed (60 tablets for 3 months), but she would have to sign a pain contract and could not receive narcotic medications by any other prescriber but me. I made it clear that this would be a one time supply, and three months should be sufficient time for her to get into a pain management clinic. After these three months, I will not supply further narcotics.     Plan:  1. Percocet #60 - 3 month supply with zero refills.   2. Referral to pain clinic.   3. MRI of the lumbar spine w/o contrast to further understand etiology.   "

## 2018-02-15 NOTE — ASSESSMENT & PLAN NOTE
Association between starting Aubagio and start of depression. Will stop Aubagio and monitor her. If no resolution after stopping drug, may treat with antidepressants. No SI. See above.

## 2018-02-15 NOTE — ASSESSMENT & PLAN NOTE
Ms. Mccormack is a 29 yo F diagnosed with MS after acute right arm and leg weakness in 2012, brain MRI showing typical MS lesions, and lumbar puncture. Her outside medical records are not available for my review at today's visit, but I have reviewed her brain MRI from 10/5/2017, and agree that the lesions seen do appear typical for MS. She was previously treated with Copaxone then switched to Aubagio due to breakthrough disease, but has developed significant depression. I would like her to stop the Aubagio and undergo the rapid elimination protocol with cholestyramine. I explained to her that otherwise, the drug may remain in her body much longer, up to 2 years.     Plan:  1. Stop Aubagio  2. Cholestyramine 4mg three times a day for 11 days. Side effects reviewed with the patient including constipation.  3. She will return in 2 weeks to follow up and further discuss her treatment options.

## 2018-02-20 DIAGNOSIS — G89.29 CHRONIC LOW BACK PAIN WITHOUT SCIATICA, UNSPECIFIED BACK PAIN LATERALITY: ICD-10-CM

## 2018-02-20 DIAGNOSIS — M54.50 CHRONIC LOW BACK PAIN WITHOUT SCIATICA, UNSPECIFIED BACK PAIN LATERALITY: ICD-10-CM

## 2018-03-15 DIAGNOSIS — G35 MULTIPLE SCLEROSIS (HCC): ICD-10-CM

## 2018-05-20 ENCOUNTER — HOSPITAL ENCOUNTER (EMERGENCY)
Facility: MEDICAL CENTER | Age: 28
End: 2018-05-20
Attending: EMERGENCY MEDICINE
Payer: MEDICARE

## 2018-05-20 ENCOUNTER — APPOINTMENT (OUTPATIENT)
Dept: RADIOLOGY | Facility: MEDICAL CENTER | Age: 28
End: 2018-05-20
Attending: EMERGENCY MEDICINE
Payer: MEDICARE

## 2018-05-20 VITALS
DIASTOLIC BLOOD PRESSURE: 76 MMHG | HEART RATE: 63 BPM | BODY MASS INDEX: 16.52 KG/M2 | TEMPERATURE: 98.6 F | HEIGHT: 63 IN | OXYGEN SATURATION: 98 % | WEIGHT: 93.25 LBS | RESPIRATION RATE: 16 BRPM | SYSTOLIC BLOOD PRESSURE: 106 MMHG

## 2018-05-20 DIAGNOSIS — M25.511 ACUTE PAIN OF RIGHT SHOULDER: ICD-10-CM

## 2018-05-20 DIAGNOSIS — K04.7 DENTAL ABSCESS: ICD-10-CM

## 2018-05-20 DIAGNOSIS — R11.2 NON-INTRACTABLE VOMITING WITH NAUSEA, UNSPECIFIED VOMITING TYPE: ICD-10-CM

## 2018-05-20 LAB
ALBUMIN SERPL BCP-MCNC: 4 G/DL (ref 3.2–4.9)
ALBUMIN/GLOB SERPL: 1.7 G/DL
ALP SERPL-CCNC: 52 U/L (ref 30–99)
ALT SERPL-CCNC: 9 U/L (ref 2–50)
ANION GAP SERPL CALC-SCNC: 7 MMOL/L (ref 0–11.9)
APPEARANCE UR: ABNORMAL
AST SERPL-CCNC: 14 U/L (ref 12–45)
BACTERIA #/AREA URNS HPF: ABNORMAL /HPF
BASOPHILS # BLD AUTO: 0.5 % (ref 0–1.8)
BASOPHILS # BLD: 0.04 K/UL (ref 0–0.12)
BILIRUB SERPL-MCNC: 0.4 MG/DL (ref 0.1–1.5)
BILIRUB UR QL STRIP.AUTO: NEGATIVE
BUN SERPL-MCNC: 12 MG/DL (ref 8–22)
CALCIUM SERPL-MCNC: 9.2 MG/DL (ref 8.5–10.5)
CHLORIDE SERPL-SCNC: 109 MMOL/L (ref 96–112)
CO2 SERPL-SCNC: 22 MMOL/L (ref 20–33)
COLOR UR: YELLOW
CREAT SERPL-MCNC: 0.73 MG/DL (ref 0.5–1.4)
DEPRECATED D DIMER PPP IA-ACNC: <200 NG/ML(D-DU)
EOSINOPHIL # BLD AUTO: 0.51 K/UL (ref 0–0.51)
EOSINOPHIL NFR BLD: 6.2 % (ref 0–6.9)
EPI CELLS #/AREA URNS HPF: ABNORMAL /HPF
ERYTHROCYTE [DISTWIDTH] IN BLOOD BY AUTOMATED COUNT: 45.8 FL (ref 35.9–50)
GLOBULIN SER CALC-MCNC: 2.4 G/DL (ref 1.9–3.5)
GLUCOSE SERPL-MCNC: 84 MG/DL (ref 65–99)
GLUCOSE UR STRIP.AUTO-MCNC: NEGATIVE MG/DL
HCG UR QL: NEGATIVE
HCT VFR BLD AUTO: 40 % (ref 37–47)
HGB BLD-MCNC: 13.3 G/DL (ref 12–16)
HYALINE CASTS #/AREA URNS LPF: ABNORMAL /LPF
IMM GRANULOCYTES # BLD AUTO: 0.02 K/UL (ref 0–0.11)
IMM GRANULOCYTES NFR BLD AUTO: 0.2 % (ref 0–0.9)
KETONES UR STRIP.AUTO-MCNC: ABNORMAL MG/DL
LEUKOCYTE ESTERASE UR QL STRIP.AUTO: NEGATIVE
LYMPHOCYTES # BLD AUTO: 3.53 K/UL (ref 1–4.8)
LYMPHOCYTES NFR BLD: 42.8 % (ref 22–41)
MCH RBC QN AUTO: 31.2 PG (ref 27–33)
MCHC RBC AUTO-ENTMCNC: 33.3 G/DL (ref 33.6–35)
MCV RBC AUTO: 93.9 FL (ref 81.4–97.8)
MICRO URNS: ABNORMAL
MONOCYTES # BLD AUTO: 0.4 K/UL (ref 0–0.85)
MONOCYTES NFR BLD AUTO: 4.8 % (ref 0–13.4)
NEUTROPHILS # BLD AUTO: 3.75 K/UL (ref 2–7.15)
NEUTROPHILS NFR BLD: 45.5 % (ref 44–72)
NITRITE UR QL STRIP.AUTO: NEGATIVE
NRBC # BLD AUTO: 0 K/UL
NRBC BLD-RTO: 0 /100 WBC
PH UR STRIP.AUTO: 6 [PH]
PLATELET # BLD AUTO: 218 K/UL (ref 164–446)
PMV BLD AUTO: 9.8 FL (ref 9–12.9)
POTASSIUM SERPL-SCNC: 4.4 MMOL/L (ref 3.6–5.5)
PROT SERPL-MCNC: 6.4 G/DL (ref 6–8.2)
PROT UR QL STRIP: NEGATIVE MG/DL
RBC # BLD AUTO: 4.26 M/UL (ref 4.2–5.4)
RBC # URNS HPF: ABNORMAL /HPF
RBC UR QL AUTO: NEGATIVE
SODIUM SERPL-SCNC: 138 MMOL/L (ref 135–145)
SP GR UR REFRACTOMETRY: 1.02
SP GR UR STRIP.AUTO: 1.02
UROBILINOGEN UR STRIP.AUTO-MCNC: 1 MG/DL
WBC # BLD AUTO: 8.3 K/UL (ref 4.8–10.8)
WBC #/AREA URNS HPF: ABNORMAL /HPF

## 2018-05-20 PROCEDURE — 71045 X-RAY EXAM CHEST 1 VIEW: CPT

## 2018-05-20 PROCEDURE — 700102 HCHG RX REV CODE 250 W/ 637 OVERRIDE(OP): Performed by: EMERGENCY MEDICINE

## 2018-05-20 PROCEDURE — 81025 URINE PREGNANCY TEST: CPT

## 2018-05-20 PROCEDURE — A9270 NON-COVERED ITEM OR SERVICE: HCPCS | Performed by: EMERGENCY MEDICINE

## 2018-05-20 PROCEDURE — 80053 COMPREHEN METABOLIC PANEL: CPT

## 2018-05-20 PROCEDURE — 700111 HCHG RX REV CODE 636 W/ 250 OVERRIDE (IP): Performed by: EMERGENCY MEDICINE

## 2018-05-20 PROCEDURE — 96374 THER/PROPH/DIAG INJ IV PUSH: CPT

## 2018-05-20 PROCEDURE — 99284 EMERGENCY DEPT VISIT MOD MDM: CPT

## 2018-05-20 PROCEDURE — 81001 URINALYSIS AUTO W/SCOPE: CPT

## 2018-05-20 PROCEDURE — 85025 COMPLETE CBC W/AUTO DIFF WBC: CPT

## 2018-05-20 PROCEDURE — 85379 FIBRIN DEGRADATION QUANT: CPT

## 2018-05-20 RX ORDER — ONDANSETRON 2 MG/ML
4 INJECTION INTRAMUSCULAR; INTRAVENOUS ONCE
Status: COMPLETED | OUTPATIENT
Start: 2018-05-20 | End: 2018-05-20

## 2018-05-20 RX ORDER — ONDANSETRON 4 MG/1
4 TABLET, ORALLY DISINTEGRATING ORAL EVERY 6 HOURS PRN
Qty: 10 TAB | Refills: 0 | Status: SHIPPED | OUTPATIENT
Start: 2018-05-20 | End: 2018-12-02

## 2018-05-20 RX ORDER — CLINDAMYCIN HYDROCHLORIDE 150 MG/1
300 CAPSULE ORAL 4 TIMES DAILY
Qty: 56 CAP | Refills: 0 | Status: SHIPPED | OUTPATIENT
Start: 2018-05-20 | End: 2018-05-27

## 2018-05-20 RX ORDER — CLINDAMYCIN HYDROCHLORIDE 150 MG/1
300 CAPSULE ORAL ONCE
Status: COMPLETED | OUTPATIENT
Start: 2018-05-20 | End: 2018-05-20

## 2018-05-20 RX ORDER — ONDANSETRON 4 MG/1
4 TABLET, ORALLY DISINTEGRATING ORAL EVERY 8 HOURS PRN
Qty: 10 TAB | Refills: 0 | Status: SHIPPED | OUTPATIENT
Start: 2018-05-20 | End: 2018-12-02

## 2018-05-20 RX ORDER — NAPROXEN 500 MG/1
500 TABLET ORAL 2 TIMES DAILY WITH MEALS
Qty: 60 TAB | Refills: 0 | Status: SHIPPED | OUTPATIENT
Start: 2018-05-20 | End: 2018-12-02

## 2018-05-20 RX ADMIN — CLINDAMYCIN HYDROCHLORIDE 300 MG: 150 CAPSULE ORAL at 21:14

## 2018-05-20 RX ADMIN — ONDANSETRON 4 MG: 2 INJECTION INTRAMUSCULAR; INTRAVENOUS at 19:38

## 2018-05-21 NOTE — ED NOTES
Chief Complaint   Patient presents with     RECHECK     [facility] [place] follow up       Initial /82  Pulse 76  Temp 98  F (36.7  C) (Oral)  Ht 1.524 m (5')  Wt 48.3 kg (106 lb 6.4 oz)  SpO2 100%  BMI 20.78 kg/m2 Estimated body mass index is 20.78 kg/(m^2) as calculated from the following:    Height as of this encounter: 1.524 m (5').    Weight as of this encounter: 48.3 kg (106 lb 6.4 oz).  Medication Reconciliation: angel HIGUERA CMA       Report to Mary PARRISH.

## 2018-05-21 NOTE — ED NOTES
Pt brought back from [facility], ambulatory with steady gait.     Pt c/o nausea with intermittent vomiting since Thurs. Pt states hx of MS and thinks this could be associated symptoms. Pt also states sharp pain to R shoulder. Denies any trauma. Pt also states she was concerned about being pregnant because she hasn't had a period since Feb. States she had the depo shot in Dec. States she took 4 at home pregnancy tests and ALL are negative.     Pt a/o x4, speaking in full sentences.     [place] fall assessment completed. No fall risk based off of assessment. Bed locked in low position, call light in place. Personal possessions in place.

## 2018-05-21 NOTE — ED NOTES
Patient discharged home. Verbalizes understanding of discharge instructions with opportunity for questions. Ambulatory. A&O.

## 2018-05-21 NOTE — ED PROVIDER NOTES
ED Provider Note    CHIEF COMPLAINT  Chief Complaint   Patient presents with   • N/V     started Thurday, unable to tolerate fluids.  had depo shot for birth control in December,  has not had period since Feb.  unsure if related.    • Shoulder Pain     (R) side, intermittent over last week.  described as sharp tinging       HPI  Nanci Mccormack is a 28 y.o. female who presents to the emergency department complaining of nausea, vomiting, dental pain and slight shoulder pain. She shoulder pain for approximately 2 weeks secondary to getting heavy objects. Denies chest pain, shortness of breath, hemoptysis, lightheadedness or dizziness. In addition, she states that she has a small abscess where her tooth was once and she popped it with her and she has a slight amount of pus draining from the area. She has difficulty swallowing, fever, neck pain. Initially she had nausea and slight vomiting for the last 2 days after she popped her abscess. As hematochezia, melena, hematemesis, vaginal bleeding, vaginal discharge, dysuria. She is on Depo-Provera and states that she has not had a period for several months. She's had multiple home present suggests are negative.    REVIEW OF SYSTEMS  Positives as above. Pertinent negatives include vaginal bleeding, vaginal discharge, chest pain, lightheadedness, dizziness, neck pain  All other review of systems are negative    PAST MEDICAL HISTORY  Past Medical History:   Diagnosis Date   • MS (multiple sclerosis) ([facility])        FAMILY HISTORY  Noncontributory    SOCIAL HISTORY  Social History     Social History   • Marital status: Single     Spouse name: N/A   • Number of children: N/A   • Years of education: N/A     Social History Main Topics   • Smoking status: Never Smoker   • Smokeless tobacco: Never Used   • Alcohol use Yes      Comment: Socially   • Drug use: Yes     Types: Inhaled, Marijuana      Comment: daily marijuana   • Sexual activity: Not on file     Other Topics Concern   • Not  "on file     Social History Narrative   • No narrative on file       SURGICAL HISTORY  Past Surgical History:   Procedure Laterality Date   • CYST EXCISION Right     Ear       CURRENT MEDICATIONS  Home Medications     Reviewed by Dawna Kent R.N. (Registered Nurse) on 05/20/18 at 1836  Med List Status: Partial   Medication Last Dose Status   baclofen (LIORESAL) 10 MG Tab  Active   cholestyramine (QUESTRAN) 4 g packet  Active   cyclobenzaprine (FLEXERIL) 10 MG Tab Not Taking Active   cyclobenzaprine (FLEXERIL) 10 MG Tab Not Taking Active   Glatiramer Acetate 20 MG/ML Solution Prefilled Syringe 10/26/2017 Active   Ibuprofen-Diphenhydramine Cit (ADVIL PM) 200-38 MG Tab 2/13/2018 Active   medroxyPROGESTERone (DEPO-PROVERA) injection 150 mg  Active   SUMAtriptan (IMITREX) 25 MG Tab tablet 2/13/2018 Active   Teriflunomide (AUBAGIO) 7 MG Tab 2/13/2018 Active                ALLERGIES  Allergies   Allergen Reactions   • Pcn [Penicillins]      Hives         PHYSICAL EXAM  VITAL SIGNS: /76   Pulse 63   Temp 37 °C (98.6 °F)   Resp 16   Ht 1.594 m (5' 2.75\")   Wt 42.3 kg (93 lb 4.1 oz)   LMP  (LMP Unknown)   SpO2 98%   BMI 16.65 kg/m²      Constitutional: Well developed, Well nourished, No acute distress, Non-toxic appearance.   Eyes: PERRLA, EOMI, Conjunctiva normal, No discharge.   HENT: The left lower premolar is missing at there is a small area of erythema slight draining fluid, there is no evidence of focal abscess  Cardiovascular: Normal heart rate, Normal rhythm, No murmurs, No rubs, No gallops, and intact distal pulses.   Thorax & Lungs:  No respiratory distress, no rales, no rhonchi, No wheezing, No chest wall tenderness.   Abdomen: Bowel sounds normal, Soft, No tenderness, No guarding, No rebound, No pulsatile masses.   Skin: Warm, Dry, No erythema, No rash.   Extremities: Full range of motion, no deformity, no edema.  Neurologic: Alert & oriented x 3, No focal deficits noted, acting appropriately " on exam.  Psychiatric: Affect normal for clinical presentation.      RADIOLOGY/PROCEDURES  DX-CHEST-LIMITED (1 VIEW)   Final Result      No acute cardiac or pulmonary abnormalities are identified.        Results for orders placed or performed during the hospital encounter of 05/20/18   URINALYSIS,CULTURE IF INDICATED   Result Value Ref Range    Color Yellow     Character Cloudy (A)     Specific Gravity 1.024 <1.035    Ph 6.0 5.0 - 8.0    Glucose Negative Negative mg/dL    Ketones Trace (A) Negative mg/dL    Protein Negative Negative mg/dL    Bilirubin Negative Negative    Urobilinogen, Urine 1.0 Negative    Nitrite Negative Negative    Leukocyte Esterase Negative Negative    Occult Blood Negative Negative    Micro Urine Req Microscopic    BETA-HCG QUALITATIVE URINE   Result Value Ref Range    Beta-Hcg Urine Negative Negative   REFRACTOMETER SG   Result Value Ref Range    Specific Gravity 1.022    URINE MICROSCOPIC (W/UA)   Result Value Ref Range    WBC 2-5 /hpf    RBC 0-2 /hpf    Bacteria Moderate (A) None /hpf    Epithelial Cells Moderate (A) /hpf    Hyaline Cast 0-2 /lpf   CBC WITH DIFFERENTIAL   Result Value Ref Range    WBC 8.3 4.8 - 10.8 K/uL    RBC 4.26 4.20 - 5.40 M/uL    Hemoglobin 13.3 12.0 - 16.0 g/dL    Hematocrit 40.0 37.0 - 47.0 %    MCV 93.9 81.4 - 97.8 fL    MCH 31.2 27.0 - 33.0 pg    MCHC 33.3 (L) 33.6 - 35.0 g/dL    RDW 45.8 35.9 - 50.0 fL    Platelet Count 218 164 - 446 K/uL    MPV 9.8 9.0 - 12.9 fL    Neutrophils-Polys 45.50 44.00 - 72.00 %    Lymphocytes 42.80 (H) 22.00 - 41.00 %    Monocytes 4.80 0.00 - 13.40 %    Eosinophils 6.20 0.00 - 6.90 %    Basophils 0.50 0.00 - 1.80 %    Immature Granulocytes 0.20 0.00 - 0.90 %    Nucleated RBC 0.00 /100 WBC    Neutrophils (Absolute) 3.75 2.00 - 7.15 K/uL    Lymphs (Absolute) 3.53 1.00 - 4.80 K/uL    Monos (Absolute) 0.40 0.00 - 0.85 K/uL    Eos (Absolute) 0.51 0.00 - 0.51 K/uL    Baso (Absolute) 0.04 0.00 - 0.12 K/uL    Immature Granulocytes (abs) 0.02  0.00 - 0.11 K/uL    NRBC (Absolute) 0.00 K/uL   CMP   Result Value Ref Range    Sodium 138 135 - 145 mmol/L    Potassium 4.4 3.6 - 5.5 mmol/L    Chloride 109 96 - 112 mmol/L    Co2 22 20 - 33 mmol/L    Anion Gap 7.0 0.0 - 11.9    Glucose 84 65 - 99 mg/dL    Bun 12 8 - 22 mg/dL    Creatinine 0.73 0.50 - 1.40 mg/dL    Calcium 9.2 8.5 - 10.5 mg/dL    AST(SGOT) 14 12 - 45 U/L    ALT(SGPT) 9 2 - 50 U/L    Alkaline Phosphatase 52 30 - 99 U/L    Total Bilirubin 0.4 0.1 - 1.5 mg/dL    Albumin 4.0 3.2 - 4.9 g/dL    Total Protein 6.4 6.0 - 8.2 g/dL    Globulin 2.4 1.9 - 3.5 g/dL    A-G Ratio 1.7 g/dL   D-DIMER   Result Value Ref Range    D-Dimer Screen <200 <250 ng/mL(D-DU)   ESTIMATED GFR   Result Value Ref Range    GFR If African American >60 >60 mL/min/1.73 m 2    GFR If Non African American >60 >60 mL/min/1.73 m 2         COURSE & MEDICAL DECISION MAKING  Pertinent Labs & Imaging studies reviewed. (See chart for details)  This is a pleasant 20 year female presents with myofascial strain of the scapular region, nausea and vomiting probably secondary to her abscessed tooth. She has no evidence of leukocytosis, she has no evidence of elevated transaminases suspicious for biliary etiology of her discomfort. In addition, the patient is not exactly entire pregnancy has not a urinary tract infection. The patient has received Zofran 4 mg IV as well as clindamycin 300 mg orally. Reevaluation, she is a nontender abdomen, shows no evidence of a surgical condition. She is to follow-up with a dentist for further evaluation and she does not have a period L abscesses or guarding emergency surgical intervention. She is positive by mouth. She'll the shoulder contusion/strain she has no evidence of fracture, she has a negative d-dimer, she is not hypoxic, tachypneic or tachycardic and she has low pretest probability for pulmonary embolism therefore did not wish is experiencing a pulmonary embolism. The patient has had strict return  precautions and is thankful, while symptoms are stable, has a nonsurgical abdomen discharge.    Discharge Medication List as of 5/20/2018  9:16 PM      START taking these medications    Details   naproxen (NAPROSYN) 500 MG Tab Take 1 Tab by mouth 2 times a day, with meals., Disp-60 Tab, R-0, Normal      !! ondansetron (ZOFRAN ODT) 4 MG TABLET DISPERSIBLE Take 1 Tab by mouth every 6 hours as needed for Nausea., Disp-10 Tab, R-0, Normal      clindamycin (CLEOCIN) 150 MG Cap Take 2 Caps by mouth 4 times a day for 7 days., Disp-56 Cap, R-0, Print Rx Paper      !! ondansetron (ZOFRAN ODT) 4 MG TABLET DISPERSIBLE Take 1 Tab by mouth every 8 hours as needed., Disp-10 Tab, R-0, Print Rx Paper       !! - Potential duplicate medications found. Please discuss with provider.          FINAL IMPRESSION     1. Dental abscess    2. Non-intractable vomiting with nausea, unspecified vomiting type    3. Acute pain of right shoulder        DISPOSITION:  Patient will be discharged home in stable condition.    FOLLOW UP:  [facility], Emergency Dept  [address] [id number] [phone]    If symptoms worsen    Geneva Granados M.D.  [address]  [place] [id number] [phone]    Schedule an appointment as soon as possible for a visit  As needed    [facility]  [address]  [place] [address]  [phone]    Schedule an appointment as soon as possible for a visit in 1 day  for your dental abscess      OUTPATIENT MEDICATIONS:  Discharge Medication List as of 5/20/2018  9:16 PM      START taking these medications    Details   naproxen (NAPROSYN) 500 MG Tab Take 1 Tab by mouth 2 times a day, with meals., Disp-60 Tab, R-0, Normal      !! ondansetron (ZOFRAN ODT) 4 MG TABLET DISPERSIBLE Take 1 Tab by mouth every 6 hours as needed for Nausea., Disp-10 Tab, R-0, Normal      clindamycin (CLEOCIN) 150 MG Cap Take 2 Caps by mouth 4 times a day for 7 days., Disp-56 Cap, R-0, Print Rx Paper       !! ondansetron (ZOFRAN ODT) 4 MG TABLET DISPERSIBLE Take 1 Tab by mouth every 8 hours as needed., Disp-10 Tab, R-0, Print Rx Paper       !! - Potential duplicate medications found. Please discuss with provider.          Electronically signed by: Ricardo Crowley, 5/20/2018 9:20 PM

## 2018-05-21 NOTE — DISCHARGE INSTRUCTIONS
Dental Abscess  Introduction  A dental abscess is pus in or around a tooth.  Follow these instructions at home:  · Take medicines only as told by your dentist.  · If you were prescribed antibiotic medicine, finish all of it even if you start to feel better.  · Rinse your mouth (gargle) often with salt water.  · Do not drive or use heavy machinery, like a , while taking pain medicine.  · Do not apply heat to the outside of your mouth.  · Keep all follow-up visits as told by your dentist. This is important.  Contact a doctor if:  · Your pain is worse, and medicine does not help.  Get help right away if:  · You have a fever or chills.  · Your symptoms suddenly get worse.  · You have a very bad headache.  · You have problems breathing or swallowing.  · You have trouble opening your mouth.  · You have puffiness (swelling) in your neck or around your eye.  This information is not intended to replace advice given to you by your health care provider. Make sure you discuss any questions you have with your health care provider.  Document Released: 05/03/2016 Document Revised: 05/25/2017 Document Reviewed: 12/15/2015  © 2017 Elsevier    Nausea and Vomiting, Adult  Nausea is the feeling that you have an upset stomach or have to vomit. As nausea gets worse, it can lead to vomiting. Vomiting occurs when stomach contents are thrown up and out of the mouth. Vomiting can make you feel weak and cause you to become dehydrated. Dehydration can make you tired and thirsty, cause you to have a dry mouth, and decrease how often you urinate. Older adults and people with other diseases or a weak immune system are at higher risk for dehydration. It is important to treat your nausea and vomiting as told by your health care provider.  Follow these instructions at home:  Follow instructions from your health care provider about how to care for yourself at home.  Eating and drinking  Follow these recommendations as told by your health  care provider:  · Take an oral rehydration solution (ORS). This is a drink that is sold at pharmacies and retail stores.  · Drink clear fluids in small amounts as you are able. Clear fluids include water, ice chips, diluted fruit juice, and low-calorie sports drinks.  · Eat bland, easy-to-digest foods in small amounts as you are able. These foods include bananas, applesauce, rice, lean meats, toast, and crackers.  · Avoid fluids that contain a lot of sugar or caffeine, such as energy drinks, sports drinks, and soda.  · Avoid alcohol.  · Avoid spicy or fatty foods.  General instructions  · Drink enough fluid to keep your urine clear or pale yellow.  · Wash your hands often. If soap and water are not available, use hand .  · Make sure that all people in your household wash their hands well and often.  · Take over-the-counter and prescription medicines only as told by your health care provider.  · Rest at home while you recover.  · Watch your condition for any changes.  · Breathe slowly and deeply when you feel nauseated.  · Keep all follow-up visits as told by your health care provider. This is important.  Contact a health care provider if:  · You have a fever.  · You cannot keep fluids down.  · Your symptoms get worse.  · You have new symptoms.  · Your nausea does not go away after two days.  · You feel light-headed or dizzy.  · You have a headache.  · You have muscle cramps.  Get help right away if:  · You have pain in your chest, neck, arm, or jaw.  · You feel extremely weak or you faint.  · You have persistent vomiting.  · You see blood in your vomit.  · Your vomit looks like black coffee grounds.  · You have bloody or black stools or stools that look like tar.  · You have a severe headache, a stiff neck, or both.  · You have a rash.  · You have severe pain, cramping, or bloating in your abdomen.  · You have trouble breathing or you are breathing very quickly.  · Your heart is beating very  quickly.  · Your skin feels cold and clammy.  · You feel confused.  · You have pain when you urinate.  · You have signs of dehydration, such as:  ¨ Dark urine, very little urine, or no urine.  ¨ Cracked lips.  ¨ Dry mouth.  ¨ Sunken eyes.  ¨ Sleepiness.  ¨ Weakness.  These symptoms may represent a serious problem that is an emergency. Do not wait to see if the symptoms will go away. Get medical help right away. Call your local emergency services (911 in the [place] Do not drive yourself to the hospital.   This information is not intended to replace advice given to you by your health care provider. Make sure you discuss any questions you have with your health care provider.  Document Released: 12/18/2006 Document Revised: 05/22/2017 Document Reviewed: 08/23/2016  [employer] Interactive Patient Education © 2017 [employer]    Shoulder Pain  Many things can cause shoulder pain, including:  · An injury to the area.  · Overuse of the shoulder.  · Arthritis.  The source of the pain can be:  · Inflammation.  · An injury to the shoulder joint.  · An injury to a tendon, ligament, or bone.  Follow these instructions at home:  Take these actions to help with your pain:  · Squeeze a soft ball or a foam pad as much as possible. This helps to keep the shoulder from swelling. It also helps to strengthen the arm.  · Take over-the-counter and prescription medicines only as told by your health care provider.  · If directed, apply ice to the area:  ¨ Put ice in a plastic bag.  ¨ Place a towel between your skin and the bag.  ¨ Leave the ice on for 20 minutes, 2-3 times per day. Stop applying ice if it does not help with the pain.  · If you were given a shoulder sling or immobilizer:  ¨ Wear it as told.  ¨ Remove it to shower or bathe.  ¨ Move your arm as little as possible, but keep your hand moving to prevent swelling.  Contact a health care provider if:  · Your pain gets worse.  · Your pain is not relieved with medicines.  · New pain  develops in your arm, hand, or fingers.  Get help right away if:  · Your arm, hand, or fingers:  ¨ Tingle.  ¨ Become numb.  ¨ Become swollen.  ¨ Become painful.  ¨ Turn white or blue.  This information is not intended to replace advice given to you by your health care provider. Make sure you discuss any questions you have with your health care provider.  Document Released: 09/27/2006 Document Revised: 08/13/2017 Document Reviewed: 04/11/2016  [employer] Interactive Patient Education © 2017 [employer]

## 2018-05-21 NOTE — ED TRIAGE NOTES
Nanci Mccormack  Chief Complaint   Patient presents with   • N/V     started Thurday, unable to tolerate fluids.  had depo shot for birth control in December,  has not had period since Feb.  unsure if related.    • Shoulder Pain     (R) side, intermittent over last week.  described as sharp tinging     Pt ambulatory to triage with above complaint.  VSS. Pt returned to [facility], educated on triage process, and to inform staff of any changes or concerns.   Specimen cup given and instructed on clean catch urine sample.

## 2018-08-21 ENCOUNTER — HOSPITAL ENCOUNTER (EMERGENCY)
Facility: MEDICAL CENTER | Age: 28
End: 2018-08-21
Attending: EMERGENCY MEDICINE
Payer: MEDICARE

## 2018-08-21 VITALS
HEART RATE: 93 BPM | TEMPERATURE: 98.1 F | SYSTOLIC BLOOD PRESSURE: 104 MMHG | WEIGHT: 89.73 LBS | BODY MASS INDEX: 16.51 KG/M2 | RESPIRATION RATE: 14 BRPM | OXYGEN SATURATION: 100 % | HEIGHT: 62 IN | DIASTOLIC BLOOD PRESSURE: 71 MMHG

## 2018-08-21 DIAGNOSIS — J01.00 ACUTE NON-RECURRENT MAXILLARY SINUSITIS: ICD-10-CM

## 2018-08-21 PROCEDURE — A9270 NON-COVERED ITEM OR SERVICE: HCPCS | Performed by: EMERGENCY MEDICINE

## 2018-08-21 PROCEDURE — 700102 HCHG RX REV CODE 250 W/ 637 OVERRIDE(OP): Performed by: EMERGENCY MEDICINE

## 2018-08-21 PROCEDURE — 99283 EMERGENCY DEPT VISIT LOW MDM: CPT

## 2018-08-21 RX ORDER — HYDROCODONE BITARTRATE AND ACETAMINOPHEN 5; 325 MG/1; MG/1
1 TABLET ORAL ONCE
Status: COMPLETED | OUTPATIENT
Start: 2018-08-21 | End: 2018-08-21

## 2018-08-21 RX ORDER — BENZONATATE 100 MG/1
100 CAPSULE ORAL 3 TIMES DAILY PRN
Qty: 20 CAP | Refills: 0 | Status: SHIPPED | OUTPATIENT
Start: 2018-08-21 | End: 2018-12-02

## 2018-08-21 RX ORDER — SULFAMETHOXAZOLE AND TRIMETHOPRIM 800; 160 MG/1; MG/1
1 TABLET ORAL 2 TIMES DAILY
Qty: 20 TAB | Refills: 0 | Status: SHIPPED | OUTPATIENT
Start: 2018-08-21 | End: 2018-08-31

## 2018-08-21 RX ADMIN — HYDROCODONE BITARTRATE AND ACETAMINOPHEN 1 TABLET: 5; 325 TABLET ORAL at 20:22

## 2018-08-21 ASSESSMENT — PAIN SCALES - GENERAL: PAINLEVEL_OUTOF10: 8

## 2018-08-22 NOTE — ED PROVIDER NOTES
ED Provider Note    HPI: Patient is a 25-year-old female who presented to the emergency departmentAugust 21, 2018 at 6:42 PM with a chief complaint of facial pain and congestion.    Patient's has had the symptoms for several days.  The pain is primarily in the maxillary region but she has little bit of frontal pain.  No neck stiffness or photophobia no nausea no vomiting.  Patient has not had a fever or chills.  No other somatic complaint    Review of Systems: Positive for facial pain and pressure mild headache negative for neck stiffness photophobia fever chills nausea vomiting abdominal pain.    Past medical/surgical history: Multiple sclerosis    Medications: Cholestyramine baclofen Imitrex aubagio    Allergies: Penicillin    Social History: No history of smoking social use of alcohol patient smokes marijuana daily      Physical exam: Constitutional: Pleasant female awake alert appeared tired  Vital signs: Temperature 98.1 pulse 93 respirations 14 blood pressure 104/71 pulse oximetry 100%  EYES: PERRL, EOMI, Conjunctivae and sclera normal, eyelids normal bilaterally.  Neck: Trachea midline. No cervical masses seen or palpated. Normal range of motion, supple. No meningeal signs elicited.  Cardiac: Regular rate and rhythm. S1-S2 present. No S3 or S4 present. No murmurs, rubs, or gallops heard. No edema or varicosities were seen.   Lungs: Clear to auscultation with good aeration throughout. No wheezes, rales, or rhonchi heard. Patient's chest wall moved symmetrically with each respiratory effort. Patient was not making use of accessory muscles of respiration in breathing.  Neurologic: alert and awake answers questions appropriately. Moves all four extremities independently, no gross focal abnormalities identified. Normal strength and motor.  Skin: no rash or lesion seen, no palpable dermatologic lesions identified.  EENT exam: Patient reports pain with palpation of both maxillary sinuses and has some mild pain with  palpation of the frontal sinuses.  No ear drainage seen.  Mastoids normal bilaterally per    Medical decision making: Patient has no signs or symptoms of meningitis or pneumonia.  Her symptoms seem to be most consistent with a sinus type infection and her physical exam certainly would be consistent with this.    Patient will be discharged on Tessalon and Bactrim.  She is given a one-time dose of narcotic pain medication in the department.  The patient is to follow-up with her primary care provider for general care.  The patient is carefully counseled return to ED for worsening headache neck stiffness photophobia vomiting or any other problems    Patient verbalized understanding of these instructions and states she will comply    Impression acute maxillary sinusitis

## 2018-08-22 NOTE — ED PROVIDER NOTES
ED Provider Note    HPI:     Review of Systems:    Past medical/surgical history:     Medications:     Allergies:     Social History:       Physical exam: Constitutional:   Vital signs:  IF HYPERTENSIVE AND D?C DOCUMENT FU  120/80  EYES: PERRL, EOMI, Conjunctivae and sclera normal, eyelids normal bilaterally.  Neck: Trachea midline. No cervical masses seen or palpated. Normal range of motion, supple. No meningeal signs elicited.  Cardiac: Regular rate and rhythm. S1-S2 present. No S3 or S4 present. No murmurs, rubs, or gallops heard. No edema or varicosities were seen.   Lungs: Clear to auscultation with good aeration throughout. No wheezes, rales, or rhonchi heard. Patient's chest wall moved symmetrically with each respiratory effort. Patient was not making use of accessory muscles of respiration in breathing.  Abdomen: Soft nontender to palpation. No rebound or guarding elicited. No organomegaly identified. No pulsatile abdominal masses identified.   Musculoskeletal:  no  pain with palpitation or movement of muscle, bone or joint , no obvious musculoskeletal deformities identified.  Neurologic: alert and awake answers questions appropriately. Moves all four extremities independently, no gross focal abnormalities identified. Normal strength and motor.  Skin: no rash or lesion seen, no palpable dermatologic lesions identified.  Psychiatric: not anxious, delusional, or hallucinating.    Medical decision making:

## 2018-08-22 NOTE — ED TRIAGE NOTES
"PT ambulated to triage c/o nasal congestion and a head ache x 1 week  Chief Complaint   Patient presents with   • Congestion   • Head Ache     Blood pressure 104/71, pulse 93, temperature 36.7 °C (98.1 °F), resp. rate 14, height 1.575 m (5' 2\"), weight 40.7 kg (89 lb 11.6 oz), SpO2 100 %.      "

## 2018-08-22 NOTE — ED NOTES
Rimmazachary Mccormack discharged via ambulation with self to lobby, reportedly for ride home with .  Discharge instructions given and reviewed, patient educated to follow up with PCP, verbalized understanding.  Prescriptions given x 2.  All personal belongings in possession.  No questions at this time.

## 2018-12-02 ENCOUNTER — HOSPITAL ENCOUNTER (EMERGENCY)
Facility: MEDICAL CENTER | Age: 28
End: 2018-12-02
Attending: EMERGENCY MEDICINE
Payer: MEDICARE

## 2018-12-02 VITALS
OXYGEN SATURATION: 97 % | DIASTOLIC BLOOD PRESSURE: 87 MMHG | HEIGHT: 62 IN | WEIGHT: 90.83 LBS | SYSTOLIC BLOOD PRESSURE: 112 MMHG | HEART RATE: 71 BPM | BODY MASS INDEX: 16.71 KG/M2 | TEMPERATURE: 98.3 F | RESPIRATION RATE: 18 BRPM

## 2018-12-02 DIAGNOSIS — H92.01 ACUTE OTALGIA, RIGHT: ICD-10-CM

## 2018-12-02 PROCEDURE — A9270 NON-COVERED ITEM OR SERVICE: HCPCS | Performed by: EMERGENCY MEDICINE

## 2018-12-02 PROCEDURE — 700102 HCHG RX REV CODE 250 W/ 637 OVERRIDE(OP): Performed by: EMERGENCY MEDICINE

## 2018-12-02 PROCEDURE — 99284 EMERGENCY DEPT VISIT MOD MDM: CPT

## 2018-12-02 RX ORDER — OXYCODONE HYDROCHLORIDE AND ACETAMINOPHEN 5; 325 MG/1; MG/1
1 TABLET ORAL ONCE
Status: COMPLETED | OUTPATIENT
Start: 2018-12-02 | End: 2018-12-02

## 2018-12-02 RX ORDER — AZITHROMYCIN 250 MG/1
TABLET, FILM COATED ORAL
Qty: 6 TAB | Refills: 0 | Status: SHIPPED | OUTPATIENT
Start: 2018-12-02 | End: 2019-07-16

## 2018-12-02 RX ORDER — OXYCODONE HYDROCHLORIDE AND ACETAMINOPHEN 5; 325 MG/1; MG/1
1-2 TABLET ORAL EVERY 6 HOURS PRN
Qty: 10 TAB | Refills: 0 | Status: SHIPPED | OUTPATIENT
Start: 2018-12-02 | End: 2018-12-04

## 2018-12-02 RX ADMIN — OXYCODONE AND ACETAMINOPHEN 1 TABLET: 5; 325 TABLET ORAL at 19:18

## 2018-12-02 ASSESSMENT — PAIN SCALES - GENERAL: PAINLEVEL_OUTOF10: 3

## 2018-12-03 NOTE — ED PROVIDER NOTES
ED Provider Note    CHIEF COMPLAINT  Chief Complaint   Patient presents with   • Headache   • Ear Pain     right ear pain x 1 wk        HPI  Nanci Mccormack is a 28 y.o. female with a history of multiple sclerosis who presents with complaints of right ear pain for the past week.  Patient says she has had a slight cold and congestion.  She says she has been having some greenish nasal drainage, but denies any facial or sinus pain.  She says that she started having ear pain about a week ago which has progressively worsened.  She denies any ear drainage.  She has had no fever, chills, sore throat, cough, or any difficulty breathing.  She denies any dental pain.      REVIEW OF SYSTEMS  See HPI for further details.     PAST MEDICAL HISTORY  Past Medical History:   Diagnosis Date   • MS (multiple sclerosis) ([facility])        FAMILY HISTORY  Family History   Problem Relation Age of Onset   • Hypertension Mother    • Cancer Mother         cervical   • No Known Problems Sister    • No Known Problems Brother    • Cancer Maternal Aunt         Leiomyosarcoma   • No Known Problems Brother    • No Known Problems Sister    • No Known Problems Daughter    • No Known Problems Daughter        SOCIAL HISTORY  Social History     Social History   • Marital status: Single     Spouse name: N/A   • Number of children: N/A   • Years of education: N/A     Social History Main Topics   • Smoking status: Never Smoker   • Smokeless tobacco: Never Used   • Alcohol use Yes      Comment: Socially   • Drug use: Yes     Types: Inhaled, Marijuana      Comment: daily marijuana   • Sexual activity: Not on file     Other Topics Concern   • Not on file     Social History Narrative   • No narrative on file       SURGICAL HISTORY  Past Surgical History:   Procedure Laterality Date   • CYST EXCISION Right     Ear       CURRENT MEDICATIONS  Home Medications     Reviewed by Anayeli Gonzalez R.N. (Registered Nurse) on 12/02/18 at 8322  Med List Status: Partial  "  Medication Last Dose Status   SUMAtriptan (IMITREX) 25 MG Tab tablet prn Active   Teriflunomide (AUBAGIO) 7 MG Tab 12/2/2018 Active                ALLERGIES  Allergies   Allergen Reactions   • Pcn [Penicillins]      Hives         PHYSICAL EXAM  VITAL SIGNS: /70   Pulse 85   Temp 36.9 °C (98.4 °F) (Temporal)   Resp 18   Ht 1.575 m (5' 2\")   Wt 41.2 kg (90 lb 13.3 oz)   LMP 11/13/2018   SpO2 97%   BMI 16.61 kg/m²   Constitutional: Awake, alert, in no acute distress, Non-toxic appearance.   HENT: Atraumatic. Bilateral external ears normal, tympanic membranes show decreased landmarks, with a slight amount of erythema to the upper portion of the tympanic membrane, left tympanic membrane is clear with fluid behind both, mucous membranes moist, throat nonerythematous without exudates, nose is normal.  There is eroded first molar to the right upper jaw without significant swelling to the gums or buccal mucosa.  No tenderness on percussion.  Eyes: PERRL, EOMI, conjunctiva moist, noninjected.  Neck: Nontender, Normal range of motion, No nuchal rigidity, No stridor.   Lymphatic: No lymphadenopathy noted.         COURSE & MEDICAL DECISION MAKING  Pertinent Labs & Imaging studies reviewed. (See chart for details)  The patient presents with the above complaints.  She was given a Percocet for pain.  On examination she complains of right ear pain.  There is no swelling to the external canal or any drainage.  She does have fluid behind the tympanic membrane and it is slightly erythematous.  Patient will be placed on a course of amoxicillin.  She is resting pain medication, stating that ibuprofen has not helped.  She is given Percocet 5/325 #10 for pain.  Patient is to follow-up with her PCP on Monday, return to ER for any worsening pain, fever, vomiting, or any other problems.    Patient does have a history of chronic pain, but appears to be low risk on the opiate risk scale.  [place] two previous prescriptions " for controlled substances in the last 2 years. In prescribing controlled substances to this patient, I certify that I have obtained and reviewed the medical history of Nanci Mccormack. I have also made a good shraddha effort to obtain applicable records from other providers who have treated the patient and records did not demonstrate any increased risk of substance abuse that would prevent me from prescribing controlled substances.     I have conducted a physical exam and documented it. I have reviewed Ms. Mccormack’s prescription history as maintained by the [place] Prescription Monitoring Program.     I have assessed the patient’s risk for abuse, dependency, and addiction using the validated Opioid Risk Tool available at https://www.mdcalc.com/zjpwvm-vzni-cjid-ort-narcotic-abuse.     Given the above, I believe the benefits of controlled substance therapy outweigh the risks. The reasons for prescribing controlled substances include non-narcotic, oral analgesic alternatives have been inadequate for pain control. Accordingly, I have discussed the risk and benefits, treatment plan, and alternative therapies with the patient.         FINAL IMPRESSION  1.  Right otalgia  2.   3.         Electronically signed by: Roberto Willett, 12/2/2018 7:05 PM

## 2018-12-03 NOTE — ED TRIAGE NOTES
Pt to triage .  Chief Complaint   Patient presents with   • Headache   • Ear Pain     right ear pain x 1 wk

## 2019-07-16 ENCOUNTER — HOSPITAL ENCOUNTER (EMERGENCY)
Facility: MEDICAL CENTER | Age: 29
End: 2019-07-16
Attending: EMERGENCY MEDICINE
Payer: MEDICARE

## 2019-07-16 VITALS
SYSTOLIC BLOOD PRESSURE: 120 MMHG | HEART RATE: 64 BPM | RESPIRATION RATE: 12 BRPM | OXYGEN SATURATION: 98 % | WEIGHT: 89.29 LBS | TEMPERATURE: 98.4 F | DIASTOLIC BLOOD PRESSURE: 86 MMHG | HEIGHT: 62 IN | BODY MASS INDEX: 16.43 KG/M2

## 2019-07-16 DIAGNOSIS — S61.412A LACERATION OF LEFT HAND WITHOUT FOREIGN BODY, INITIAL ENCOUNTER: ICD-10-CM

## 2019-07-16 DIAGNOSIS — R20.2 PARESTHESIA: ICD-10-CM

## 2019-07-16 PROCEDURE — 99284 EMERGENCY DEPT VISIT MOD MDM: CPT

## 2019-07-16 PROCEDURE — 90715 TDAP VACCINE 7 YRS/> IM: CPT | Performed by: EMERGENCY MEDICINE

## 2019-07-16 PROCEDURE — 700111 HCHG RX REV CODE 636 W/ 250 OVERRIDE (IP): Performed by: EMERGENCY MEDICINE

## 2019-07-16 PROCEDURE — 90471 IMMUNIZATION ADMIN: CPT

## 2019-07-16 RX ORDER — HYDROCODONE BITARTRATE AND ACETAMINOPHEN 5; 325 MG/1; MG/1
1-2 TABLET ORAL EVERY 4 HOURS PRN
Qty: 15 TAB | Refills: 0 | Status: SHIPPED | OUTPATIENT
Start: 2019-07-16 | End: 2019-07-19

## 2019-07-16 RX ORDER — CEPHALEXIN 500 MG/1
500 CAPSULE ORAL 4 TIMES DAILY
Qty: 28 CAP | Refills: 0 | Status: SHIPPED | OUTPATIENT
Start: 2019-07-16 | End: 2019-07-23

## 2019-07-16 RX ADMIN — CLOSTRIDIUM TETANI TOXOID ANTIGEN (FORMALDEHYDE INACTIVATED), CORYNEBACTERIUM DIPHTHERIAE TOXOID ANTIGEN (FORMALDEHYDE INACTIVATED), BORDETELLA PERTUSSIS TOXOID ANTIGEN (GLUTARALDEHYDE INACTIVATED), BORDETELLA PERTUSSIS FILAMENTOUS HEMAGGLUTININ ANTIGEN (FORMALDEHYDE INACTIVATED), BORDETELLA PERTUSSIS PERTACTIN ANTIGEN, AND BORDETELLA PERTUSSIS FIMBRIAE 2/3 ANTIGEN 0.5 ML: 5; 2; 2.5; 5; 3; 5 INJECTION, SUSPENSION INTRAMUSCULAR at 18:56

## 2019-07-17 NOTE — ED PROVIDER NOTES
"ED Provider Note    CHIEF COMPLAINT  Laceration    [facility]  Nanci Mccormack is a 29 y.o. female who presents with complaint of a laceration on the palm of her left hand Which occurred about 6:00 this morning.The patient sustained this injury by stabbing herself with a knife while trying to cut and avocado. Tetanus vaccination status reviewed and is not up-to-date.  Patient initially did not think anything of it as the bleeding was controlled.  However later in the day she noticed she was having a numbness in her second and third digits.  She denies any coolness to her fingers.  She is complaining of some pain radiating up her arm.  She has some pain with flexion of her fourth and fifth digit but she states she is able to range her fingers.  She denies any foreign body.  She does not suspect fracture.    REVIEW OF SYSTEMS  See HPI for further details.  Positive for numbness in her fingers and pain radiating up her arm..denies fevers, coolness, foreign body, hyperglycemia.    PAST MEDICAL HISTORY   has a past medical history of MS (multiple sclerosis) ([facility]).    SOCIAL HISTORY  Social History     Social History Main Topics   • Smoking status: Never Smoker   • Smokeless tobacco: Never Used   • Alcohol use Yes      Comment: Socially   • Drug use: Yes     Types: Inhaled, Marijuana      Comment: daily marijuana   • Sexual activity: Not on file       SURGICAL HISTORY   has a past surgical history that includes cyst excision (Right).    CURRENT MEDICATIONS  Reviewed.  See Encounter Summary.    ALLERGIES  Allergies   Allergen Reactions   • Pcn [Penicillins]      Hives         PHYSICAL EXAM  VITAL SIGNS: /79   Pulse 91   Temp 36.9 °C (98.4 °F) (Temporal)   Resp 12   Ht 1.575 m (5' 2\")   Wt 40.5 kg (89 lb 4.6 oz)   SpO2 95%   BMI 16.33 kg/m²   Constitutional: Alert in no apparent distress.  HENT: Normocephalic, Atraumatic, Bilateral external ears normal. Nose normal.   Eyes: Pupils are equal and reactive. " Conjunctiva normal, non-icteric.   Thorax & Lungs: Easy unlabored respirations  Abdomen:  No gross signs of peritonitis no pain with movement   Skin: On the palm of the left hand just above the palmar crease there is a small 1 cm vertical laceration between the second and third digit.  There is no active bleeding.  There is no redness or drainage.  Extremities: Patient is able to flex her fingers at the tips however she is unable to flex her fingers when isolating the base of her second and third fingers.  She has good cap refill.  She has tenderness to palpation to the palm of the hand.  Patient has numbness to the second digit as well as the medial aspect of the third digit.  She has sensation over the back of her finger.  Neurologic: Alert, Grossly non-focal.   Psychiatric: Affect and Mood normal      COURSE & MEDICAL DECISION MAKING  Pertinent Labs & Imaging studies reviewed. (See chart for details)    Patient presents with a injury to her hand from a knife.  With her numbness to her second and third digit I am concerned about a possible ulnar nerve injury.  There is no expanding hematoma and there is no active bleeding.  She has good cap refill and I doubt a vascular injury.  She also has limited flexion of her second and third digit and therefore a possible tendon injury is also on the differential.  The injury is over 12 hours old.  She will receive a tetanus shot and Ortho will be consulted.  The laceration does not need suture repair      In prescribing controlled substances to this patient, I certify that I have obtained and reviewed the medical history of Nanci Mccormack. I have also made a good shraddha effort to obtain applicable records from other providers who have treated the patient and no other records are available at this time.     I have conducted a physical exam and documented it. I have reviewed Ms. Mccormack’s prescription history as maintained by the [place] Prescription Monitoring Program.      I have assessed the patient’s risk for abuse, dependency, and addiction using the validated Opioid Risk Tool available at https://www.mdcalc.com/gkzhof-jnak-bbfm-ort-narcotic-abuse.     Given the above, I believe the benefits of controlled substance therapy outweigh the risks. The reasons for prescribing controlled substances include non-narcotic, oral analgesic alternatives have been inadequate for pain control. Accordingly, I have discussed the risk and benefits, treatment plan, and alternative therapies with the patient.     Discussed with Dr. Farfan.  He is aware of the patient's decreased sensation to her fingers as well as her limited range of motion.  I am concerned about her ulnar nerve and possible tendon laceration.  Patient is to follow-up with hand surgery this week.      We will place the patient on prophylactic antibiotics.  Strict return precautions were given.         FINAL IMPRESSION  1. HandLaceration, 1 cm      The patient was discharged home with an information sheet on laceration care and told to return immediately for any signs or symptoms listed, but specifically if any redness, drainage, pus or wound opening.  The follow-up plan is documented in EPIC and provided in writing to the patient.    Electronically signed by: Alis Juan, 7/16/2019 6:50 PM

## 2019-07-17 NOTE — DISCHARGE INSTRUCTIONS
You must call the orthopedic specialist tomorrow as you need to see a hand specialist.  I believe you have hurt your ulnar nerve as well as possibly a tendon.  Take the medication as directed.  Any fevers, worsening pain, new or different symptoms or concerns return.

## 2019-07-17 NOTE — ED NOTES
Discharge instructions gone over with pt. Pt verbalized understanding. All questions answered. Pt educated on prescriptions given. Pt educated to f/u with ortho hand specialist. Pt educated on s/sx to return to ED. Pt left ambulatory with steady gait.

## 2019-07-17 NOTE — ED TRIAGE NOTES
"Chief Complaint   Patient presents with   • Hand Laceration     accidentally stabbed palm of LT hand with knife while cutting an avocado last night. very small lac, bleeding controlled. went to sleep and is now having pain in LT hand/arm. numbness to 2nd and 3rd digits.    • Numbness     Pt to triage for above. tdap is not up to date.     Pt returned to lobby. Educated on triage process and to inform staff of any changes.     /79   Pulse 91   Temp 36.9 °C (98.4 °F) (Temporal)   Resp 12   Ht 1.575 m (5' 2\")   Wt 40.5 kg (89 lb 4.6 oz)   SpO2 95%   BMI 16.33 kg/m²     "

## 2020-01-01 ENCOUNTER — APPOINTMENT (OUTPATIENT)
Dept: RADIOLOGY | Facility: MEDICAL CENTER | Age: 30
DRG: 129 | End: 2020-01-01
Attending: EMERGENCY MEDICINE
Payer: MEDICARE

## 2020-01-01 ENCOUNTER — APPOINTMENT (OUTPATIENT)
Dept: RADIOLOGY | Facility: MEDICAL CENTER | Age: 30
DRG: 129 | End: 2020-01-01
Attending: DENTIST
Payer: MEDICARE

## 2020-01-01 ENCOUNTER — HOSPITAL ENCOUNTER (INPATIENT)
Facility: MEDICAL CENTER | Age: 30
LOS: 1 days | DRG: 129 | End: 2020-01-02
Attending: EMERGENCY MEDICINE | Admitting: HOSPITALIST
Payer: MEDICARE

## 2020-01-01 ENCOUNTER — ANESTHESIA (OUTPATIENT)
Dept: SURGERY | Facility: MEDICAL CENTER | Age: 30
DRG: 129 | End: 2020-01-01
Payer: MEDICARE

## 2020-01-01 ENCOUNTER — ANESTHESIA EVENT (OUTPATIENT)
Dept: SURGERY | Facility: MEDICAL CENTER | Age: 30
DRG: 129 | End: 2020-01-01
Payer: MEDICARE

## 2020-01-01 DIAGNOSIS — G89.4 CHRONIC PAIN SYNDROME: ICD-10-CM

## 2020-01-01 DIAGNOSIS — L03.211 FACIAL CELLULITIS: ICD-10-CM

## 2020-01-01 DIAGNOSIS — K04.7 DENTAL ABSCESS: ICD-10-CM

## 2020-01-01 LAB
ANION GAP SERPL CALC-SCNC: 8 MMOL/L (ref 0–11.9)
BASOPHILS # BLD AUTO: 0.4 % (ref 0–1.8)
BASOPHILS # BLD: 0.05 K/UL (ref 0–0.12)
BUN SERPL-MCNC: 11 MG/DL (ref 8–22)
CALCIUM SERPL-MCNC: 9 MG/DL (ref 8.5–10.5)
CHLORIDE SERPL-SCNC: 106 MMOL/L (ref 96–112)
CO2 SERPL-SCNC: 23 MMOL/L (ref 20–33)
CREAT SERPL-MCNC: 0.78 MG/DL (ref 0.5–1.4)
EOSINOPHIL # BLD AUTO: 0.18 K/UL (ref 0–0.51)
EOSINOPHIL NFR BLD: 1.5 % (ref 0–6.9)
ERYTHROCYTE [DISTWIDTH] IN BLOOD BY AUTOMATED COUNT: 44.6 FL (ref 35.9–50)
GLUCOSE SERPL-MCNC: 107 MG/DL (ref 65–99)
GRAM STN SPEC: NORMAL
HCG UR QL: NEGATIVE
HCT VFR BLD AUTO: 41.2 % (ref 37–47)
HGB BLD-MCNC: 13.3 G/DL (ref 12–16)
IMM GRANULOCYTES # BLD AUTO: 0.03 K/UL (ref 0–0.11)
IMM GRANULOCYTES NFR BLD AUTO: 0.2 % (ref 0–0.9)
LYMPHOCYTES # BLD AUTO: 2.63 K/UL (ref 1–4.8)
LYMPHOCYTES NFR BLD: 21.8 % (ref 22–41)
MCH RBC QN AUTO: 32 PG (ref 27–33)
MCHC RBC AUTO-ENTMCNC: 32.3 G/DL (ref 33.6–35)
MCV RBC AUTO: 99.3 FL (ref 81.4–97.8)
MONOCYTES # BLD AUTO: 0.52 K/UL (ref 0–0.85)
MONOCYTES NFR BLD AUTO: 4.3 % (ref 0–13.4)
NEUTROPHILS # BLD AUTO: 8.67 K/UL (ref 2–7.15)
NEUTROPHILS NFR BLD: 71.8 % (ref 44–72)
NRBC # BLD AUTO: 0 K/UL
NRBC BLD-RTO: 0 /100 WBC
PLATELET # BLD AUTO: 236 K/UL (ref 164–446)
PMV BLD AUTO: 9.3 FL (ref 9–12.9)
POTASSIUM SERPL-SCNC: 3.8 MMOL/L (ref 3.6–5.5)
RBC # BLD AUTO: 4.15 M/UL (ref 4.2–5.4)
SIGNIFICANT IND 70042: NORMAL
SITE SITE: NORMAL
SODIUM SERPL-SCNC: 137 MMOL/L (ref 135–145)
SOURCE SOURCE: NORMAL
WBC # BLD AUTO: 12.1 K/UL (ref 4.8–10.8)

## 2020-01-01 PROCEDURE — 160027 HCHG SURGERY MINUTES - 1ST 30 MINS LEVEL 2: Performed by: DENTIST

## 2020-01-01 PROCEDURE — 70355 PANORAMIC X-RAY OF JAWS: CPT

## 2020-01-01 PROCEDURE — 0CDWXZ1 EXTRACTION OF UPPER TOOTH, MULTIPLE, EXTERNAL APPROACH: ICD-10-PCS | Performed by: DENTIST

## 2020-01-01 PROCEDURE — 87070 CULTURE OTHR SPECIMN AEROBIC: CPT

## 2020-01-01 PROCEDURE — 80048 BASIC METABOLIC PNL TOTAL CA: CPT

## 2020-01-01 PROCEDURE — 160048 HCHG OR STATISTICAL LEVEL 1-5: Performed by: DENTIST

## 2020-01-01 PROCEDURE — 770006 HCHG ROOM/CARE - MED/SURG/GYN SEMI*

## 2020-01-01 PROCEDURE — 85025 COMPLETE CBC W/AUTO DIFF WBC: CPT

## 2020-01-01 PROCEDURE — 87075 CULTR BACTERIA EXCEPT BLOOD: CPT

## 2020-01-01 PROCEDURE — 81025 URINE PREGNANCY TEST: CPT

## 2020-01-01 PROCEDURE — 96365 THER/PROPH/DIAG IV INF INIT: CPT | Mod: XU

## 2020-01-01 PROCEDURE — 160035 HCHG PACU - 1ST 60 MINS PHASE I: Performed by: DENTIST

## 2020-01-01 PROCEDURE — 99285 EMERGENCY DEPT VISIT HI MDM: CPT

## 2020-01-01 PROCEDURE — 500754 HCHG JAW BRA: Performed by: DENTIST

## 2020-01-01 PROCEDURE — 700101 HCHG RX REV CODE 250: Performed by: ANESTHESIOLOGY

## 2020-01-01 PROCEDURE — 0NBT0ZZ EXCISION OF RIGHT MANDIBLE, OPEN APPROACH: ICD-10-PCS | Performed by: DENTIST

## 2020-01-01 PROCEDURE — 36415 COLL VENOUS BLD VENIPUNCTURE: CPT

## 2020-01-01 PROCEDURE — 70487 CT MAXILLOFACIAL W/DYE: CPT

## 2020-01-01 PROCEDURE — 160002 HCHG RECOVERY MINUTES (STAT): Performed by: DENTIST

## 2020-01-01 PROCEDURE — 700111 HCHG RX REV CODE 636 W/ 250 OVERRIDE (IP): Performed by: ANESTHESIOLOGY

## 2020-01-01 PROCEDURE — 700111 HCHG RX REV CODE 636 W/ 250 OVERRIDE (IP): Performed by: EMERGENCY MEDICINE

## 2020-01-01 PROCEDURE — 160009 HCHG ANES TIME/MIN: Performed by: DENTIST

## 2020-01-01 PROCEDURE — 700105 HCHG RX REV CODE 258: Performed by: HOSPITALIST

## 2020-01-01 PROCEDURE — 700111 HCHG RX REV CODE 636 W/ 250 OVERRIDE (IP): Performed by: HOSPITALIST

## 2020-01-01 PROCEDURE — 99223 1ST HOSP IP/OBS HIGH 75: CPT | Performed by: HOSPITALIST

## 2020-01-01 PROCEDURE — 0CDXXZ0 EXTRACTION OF LOWER TOOTH, SINGLE, EXTERNAL APPROACH: ICD-10-PCS | Performed by: DENTIST

## 2020-01-01 PROCEDURE — 87205 SMEAR GRAM STAIN: CPT

## 2020-01-01 PROCEDURE — 700111 HCHG RX REV CODE 636 W/ 250 OVERRIDE (IP): Performed by: FAMILY MEDICINE

## 2020-01-01 PROCEDURE — 96375 TX/PRO/DX INJ NEW DRUG ADDON: CPT

## 2020-01-01 PROCEDURE — 700105 HCHG RX REV CODE 258: Performed by: ANESTHESIOLOGY

## 2020-01-01 PROCEDURE — 500440 HCHG DRESSING, STERILE ROLL (KERLIX): Performed by: DENTIST

## 2020-01-01 PROCEDURE — 700101 HCHG RX REV CODE 250: Performed by: EMERGENCY MEDICINE

## 2020-01-01 PROCEDURE — 700117 HCHG RX CONTRAST REV CODE 255: Performed by: EMERGENCY MEDICINE

## 2020-01-01 PROCEDURE — 700101 HCHG RX REV CODE 250: Performed by: DENTIST

## 2020-01-01 PROCEDURE — 501838 HCHG SUTURE GENERAL: Performed by: DENTIST

## 2020-01-01 RX ORDER — CLINDAMYCIN PHOSPHATE 600 MG/50ML
600 INJECTION, SOLUTION INTRAVENOUS EVERY 8 HOURS
Status: DISCONTINUED | OUTPATIENT
Start: 2020-01-01 | End: 2020-01-02 | Stop reason: HOSPADM

## 2020-01-01 RX ORDER — HALOPERIDOL 5 MG/ML
1 INJECTION INTRAMUSCULAR
Status: DISCONTINUED | OUTPATIENT
Start: 2020-01-01 | End: 2020-01-01 | Stop reason: HOSPADM

## 2020-01-01 RX ORDER — ONDANSETRON 2 MG/ML
4 INJECTION INTRAMUSCULAR; INTRAVENOUS ONCE
Status: COMPLETED | OUTPATIENT
Start: 2020-01-01 | End: 2020-01-01

## 2020-01-01 RX ORDER — MEPERIDINE HYDROCHLORIDE 25 MG/ML
12.5 INJECTION INTRAMUSCULAR; INTRAVENOUS; SUBCUTANEOUS
Status: DISCONTINUED | OUTPATIENT
Start: 2020-01-01 | End: 2020-01-01 | Stop reason: HOSPADM

## 2020-01-01 RX ORDER — LIDOCAINE HYDROCHLORIDE AND EPINEPHRINE 10; 10 MG/ML; UG/ML
INJECTION, SOLUTION INFILTRATION; PERINEURAL
Status: DISCONTINUED | OUTPATIENT
Start: 2020-01-01 | End: 2020-01-01 | Stop reason: HOSPADM

## 2020-01-01 RX ORDER — ACETAMINOPHEN 325 MG/1
650 TABLET ORAL EVERY 6 HOURS PRN
Status: DISCONTINUED | OUTPATIENT
Start: 2020-01-01 | End: 2020-01-02 | Stop reason: HOSPADM

## 2020-01-01 RX ORDER — TERIFLUNOMIDE 7 MG/1
7 TABLET, FILM COATED ORAL DAILY
Status: DISCONTINUED | OUTPATIENT
Start: 2020-01-01 | End: 2020-01-01

## 2020-01-01 RX ORDER — CLINDAMYCIN PHOSPHATE 600 MG/50ML
600 INJECTION, SOLUTION INTRAVENOUS ONCE
Status: COMPLETED | OUTPATIENT
Start: 2020-01-01 | End: 2020-01-01

## 2020-01-01 RX ORDER — ROCURONIUM BROMIDE 10 MG/ML
INJECTION, SOLUTION INTRAVENOUS PRN
Status: DISCONTINUED | OUTPATIENT
Start: 2020-01-01 | End: 2020-01-01 | Stop reason: SURG

## 2020-01-01 RX ORDER — IBUPROFEN 200 MG
400 TABLET ORAL EVERY 6 HOURS PRN
COMMUNITY
End: 2021-10-05

## 2020-01-01 RX ORDER — PROCHLORPERAZINE EDISYLATE 5 MG/ML
5-10 INJECTION INTRAMUSCULAR; INTRAVENOUS EVERY 4 HOURS PRN
Status: DISCONTINUED | OUTPATIENT
Start: 2020-01-01 | End: 2020-01-02 | Stop reason: HOSPADM

## 2020-01-01 RX ORDER — PROMETHAZINE HYDROCHLORIDE 25 MG/1
12.5-25 SUPPOSITORY RECTAL EVERY 4 HOURS PRN
Status: DISCONTINUED | OUTPATIENT
Start: 2020-01-01 | End: 2020-01-02 | Stop reason: HOSPADM

## 2020-01-01 RX ORDER — MORPHINE SULFATE 4 MG/ML
4 INJECTION, SOLUTION INTRAMUSCULAR; INTRAVENOUS ONCE
Status: COMPLETED | OUTPATIENT
Start: 2020-01-01 | End: 2020-01-01

## 2020-01-01 RX ORDER — ONDANSETRON 2 MG/ML
4 INJECTION INTRAMUSCULAR; INTRAVENOUS
Status: DISCONTINUED | OUTPATIENT
Start: 2020-01-01 | End: 2020-01-01 | Stop reason: HOSPADM

## 2020-01-01 RX ORDER — ONDANSETRON 4 MG/1
4 TABLET, ORALLY DISINTEGRATING ORAL EVERY 4 HOURS PRN
Status: DISCONTINUED | OUTPATIENT
Start: 2020-01-01 | End: 2020-01-02 | Stop reason: HOSPADM

## 2020-01-01 RX ORDER — HYDROMORPHONE HYDROCHLORIDE 1 MG/ML
0.5 INJECTION, SOLUTION INTRAMUSCULAR; INTRAVENOUS; SUBCUTANEOUS ONCE
Status: COMPLETED | OUTPATIENT
Start: 2020-01-01 | End: 2020-01-01

## 2020-01-01 RX ORDER — MIDAZOLAM HYDROCHLORIDE 1 MG/ML
1 INJECTION INTRAMUSCULAR; INTRAVENOUS
Status: DISCONTINUED | OUTPATIENT
Start: 2020-01-01 | End: 2020-01-01 | Stop reason: HOSPADM

## 2020-01-01 RX ORDER — MORPHINE SULFATE 4 MG/ML
1-4 INJECTION, SOLUTION INTRAMUSCULAR; INTRAVENOUS
Status: DISCONTINUED | OUTPATIENT
Start: 2020-01-01 | End: 2020-01-02 | Stop reason: HOSPADM

## 2020-01-01 RX ORDER — ONDANSETRON 2 MG/ML
4 INJECTION INTRAMUSCULAR; INTRAVENOUS EVERY 4 HOURS PRN
Status: DISCONTINUED | OUTPATIENT
Start: 2020-01-01 | End: 2020-01-02 | Stop reason: HOSPADM

## 2020-01-01 RX ORDER — SODIUM CHLORIDE, SODIUM LACTATE, POTASSIUM CHLORIDE, CALCIUM CHLORIDE 600; 310; 30; 20 MG/100ML; MG/100ML; MG/100ML; MG/100ML
INJECTION, SOLUTION INTRAVENOUS CONTINUOUS
Status: DISCONTINUED | OUTPATIENT
Start: 2020-01-01 | End: 2020-01-01 | Stop reason: HOSPADM

## 2020-01-01 RX ORDER — DIPHENHYDRAMINE HYDROCHLORIDE 50 MG/ML
INJECTION INTRAMUSCULAR; INTRAVENOUS PRN
Status: DISCONTINUED | OUTPATIENT
Start: 2020-01-01 | End: 2020-01-01 | Stop reason: SURG

## 2020-01-01 RX ORDER — BISACODYL 10 MG
10 SUPPOSITORY, RECTAL RECTAL
Status: DISCONTINUED | OUTPATIENT
Start: 2020-01-01 | End: 2020-01-02 | Stop reason: HOSPADM

## 2020-01-01 RX ORDER — POLYETHYLENE GLYCOL 3350 17 G/17G
1 POWDER, FOR SOLUTION ORAL
Status: DISCONTINUED | OUTPATIENT
Start: 2020-01-01 | End: 2020-01-02 | Stop reason: HOSPADM

## 2020-01-01 RX ORDER — MIDAZOLAM HYDROCHLORIDE 1 MG/ML
INJECTION INTRAMUSCULAR; INTRAVENOUS PRN
Status: DISCONTINUED | OUTPATIENT
Start: 2020-01-01 | End: 2020-01-01 | Stop reason: SURG

## 2020-01-01 RX ORDER — AMOXICILLIN 250 MG
2 CAPSULE ORAL 2 TIMES DAILY
Status: DISCONTINUED | OUTPATIENT
Start: 2020-01-01 | End: 2020-01-02 | Stop reason: HOSPADM

## 2020-01-01 RX ORDER — MORPHINE SULFATE 4 MG/ML
2 INJECTION, SOLUTION INTRAMUSCULAR; INTRAVENOUS ONCE
Status: COMPLETED | OUTPATIENT
Start: 2020-01-01 | End: 2020-01-01

## 2020-01-01 RX ORDER — OXYCODONE HYDROCHLORIDE 5 MG/1
5-10 TABLET ORAL EVERY 4 HOURS PRN
Status: DISCONTINUED | OUTPATIENT
Start: 2020-01-01 | End: 2020-01-02 | Stop reason: HOSPADM

## 2020-01-01 RX ORDER — PROMETHAZINE HYDROCHLORIDE 25 MG/1
12.5-25 TABLET ORAL EVERY 4 HOURS PRN
Status: DISCONTINUED | OUTPATIENT
Start: 2020-01-01 | End: 2020-01-02 | Stop reason: HOSPADM

## 2020-01-01 RX ORDER — DIPHENHYDRAMINE HYDROCHLORIDE 50 MG/ML
12.5 INJECTION INTRAMUSCULAR; INTRAVENOUS
Status: DISCONTINUED | OUTPATIENT
Start: 2020-01-01 | End: 2020-01-01 | Stop reason: HOSPADM

## 2020-01-01 RX ORDER — SODIUM CHLORIDE, SODIUM LACTATE, POTASSIUM CHLORIDE, CALCIUM CHLORIDE 600; 310; 30; 20 MG/100ML; MG/100ML; MG/100ML; MG/100ML
INJECTION, SOLUTION INTRAVENOUS
Status: DISCONTINUED | OUTPATIENT
Start: 2020-01-01 | End: 2020-01-01 | Stop reason: SURG

## 2020-01-01 RX ADMIN — ONDANSETRON 4 MG: 2 INJECTION INTRAMUSCULAR; INTRAVENOUS at 21:20

## 2020-01-01 RX ADMIN — MORPHINE SULFATE 4 MG: 4 INJECTION INTRAVENOUS at 23:35

## 2020-01-01 RX ADMIN — SUGAMMADEX 200 MG: 100 INJECTION, SOLUTION INTRAVENOUS at 21:20

## 2020-01-01 RX ADMIN — MORPHINE SULFATE 4 MG: 4 INJECTION INTRAVENOUS at 17:35

## 2020-01-01 RX ADMIN — MORPHINE SULFATE 4 MG: 4 INJECTION INTRAVENOUS at 14:48

## 2020-01-01 RX ADMIN — PROPOFOL 150 MG: 10 INJECTION, EMULSION INTRAVENOUS at 21:05

## 2020-01-01 RX ADMIN — ONDANSETRON 4 MG: 2 INJECTION INTRAMUSCULAR; INTRAVENOUS at 12:25

## 2020-01-01 RX ADMIN — CLINDAMYCIN IN 5 PERCENT DEXTROSE 600 MG: 12 INJECTION, SOLUTION INTRAVENOUS at 12:38

## 2020-01-01 RX ADMIN — POTASSIUM CHLORIDE: 149 INJECTION, SOLUTION, CONCENTRATE INTRAVENOUS at 22:49

## 2020-01-01 RX ADMIN — SODIUM CHLORIDE, POTASSIUM CHLORIDE, SODIUM LACTATE AND CALCIUM CHLORIDE: 600; 310; 30; 20 INJECTION, SOLUTION INTRAVENOUS at 21:00

## 2020-01-01 RX ADMIN — FENTANYL CITRATE 100 MCG: 50 INJECTION, SOLUTION INTRAMUSCULAR; INTRAVENOUS at 21:03

## 2020-01-01 RX ADMIN — MIDAZOLAM 2 MG: 1 INJECTION INTRAMUSCULAR; INTRAVENOUS at 21:03

## 2020-01-01 RX ADMIN — IOHEXOL 100 ML: 350 INJECTION, SOLUTION INTRAVENOUS at 16:00

## 2020-01-01 RX ADMIN — DIPHENHYDRAMINE HYDROCHLORIDE 12.5 MG: 50 INJECTION, SOLUTION INTRAMUSCULAR; INTRAVENOUS at 21:13

## 2020-01-01 RX ADMIN — MORPHINE SULFATE 4 MG: 4 INJECTION INTRAVENOUS at 22:31

## 2020-01-01 RX ADMIN — MORPHINE SULFATE 4 MG: 4 INJECTION INTRAVENOUS at 16:10

## 2020-01-01 RX ADMIN — HYDROMORPHONE HYDROCHLORIDE 0.5 MG: 1 INJECTION, SOLUTION INTRAMUSCULAR; INTRAVENOUS; SUBCUTANEOUS at 12:25

## 2020-01-01 RX ADMIN — ROCURONIUM BROMIDE 25 MG: 10 INJECTION, SOLUTION INTRAVENOUS at 21:05

## 2020-01-01 RX ADMIN — MORPHINE SULFATE 4 MG: 4 INJECTION INTRAVENOUS at 19:27

## 2020-01-01 RX ADMIN — MORPHINE SULFATE 2 MG: 4 INJECTION INTRAVENOUS at 23:24

## 2020-01-01 ASSESSMENT — COGNITIVE AND FUNCTIONAL STATUS - GENERAL
SUGGESTED CMS G CODE MODIFIER MOBILITY: CH
SUGGESTED CMS G CODE MODIFIER MOBILITY: CH
DAILY ACTIVITIY SCORE: 24
MOBILITY SCORE: 24
MOBILITY SCORE: 24
SUGGESTED CMS G CODE MODIFIER DAILY ACTIVITY: CH

## 2020-01-01 ASSESSMENT — LIFESTYLE VARIABLES
EVER_SMOKED: NEVER
EVER FELT BAD OR GUILTY ABOUT YOUR DRINKING: NO
TOTAL SCORE: 0
DOES PATIENT WANT TO STOP DRINKING: NO
ALCOHOL_USE: YES
AVERAGE NUMBER OF DAYS PER WEEK YOU HAVE A DRINK CONTAINING ALCOHOL: 5
TOTAL SCORE: 0
CONSUMPTION TOTAL: POSITIVE
EVER HAD A DRINK FIRST THING IN THE MORNING TO STEADY YOUR NERVES TO GET RID OF A HANGOVER: NO
ON A TYPICAL DAY WHEN YOU DRINK ALCOHOL HOW MANY DRINKS DO YOU HAVE: 2
HAVE PEOPLE ANNOYED YOU BY CRITICIZING YOUR DRINKING: NO
TOTAL SCORE: 0
HOW MANY TIMES IN THE PAST YEAR HAVE YOU HAD 5 OR MORE DRINKS IN A DAY: 10
HAVE YOU EVER FELT YOU SHOULD CUT DOWN ON YOUR DRINKING: NO

## 2020-01-01 ASSESSMENT — PATIENT HEALTH QUESTIONNAIRE - PHQ9
2. FEELING DOWN, DEPRESSED, IRRITABLE, OR HOPELESS: NOT AT ALL
1. LITTLE INTEREST OR PLEASURE IN DOING THINGS: NOT AT ALL
SUM OF ALL RESPONSES TO PHQ9 QUESTIONS 1 AND 2: 0

## 2020-01-01 ASSESSMENT — ENCOUNTER SYMPTOMS
CHILLS: 0
FEVER: 0

## 2020-01-01 ASSESSMENT — PAIN SCALES - GENERAL: PAIN_LEVEL: 0

## 2020-01-01 NOTE — ED TRIAGE NOTES
Ambulates to triage  Chief Complaint   Patient presents with   • Facial Swelling     R upper jaw x4 days     Pt had a tooth pulled 2 weeks ago on her R lower, was on abx for a week after that, then noticed pain and swelling to her R upper jaw, with pain traveling to her R hear and head.

## 2020-01-01 NOTE — ED PROVIDER NOTES
ED Provider Note    Scribed for Yomi Forbes M.D. by Heraclio Barros. 1/1/2020, 12:03 PM.    Primary care provider: Geneva Granados M.D.  Means of arrival: Private Vehicle  History obtained from: Patient  History limited by: None    CHIEF COMPLAINT  Chief Complaint   Patient presents with   • Facial Swelling     R upper jaw x4 days       HPI  Nanci Mccormack is a 29 y.o. female who presents to the Emergency Department for right upper facial swelling onset 4 days ago. She notes that she is swollen inside and outside of her mouth. She recently was at the dentist 2 weeks ago and had her lower wisdom teeth extracted. She has not been able to contact her dentist about her swelling today as she has been out of town. She notes associated vomiting and nausea. She denies any sinus pain or dental pain, diarrhea, or fever.     REVIEW OF SYSTEMS  Pertinent positives include right upper facial swelling, vomiting and nausea. Pertinent negatives include no sinus pain or dental pain, diarrhea, or fever.  All other systems negative.      PAST MEDICAL HISTORY   has a past medical history of MS (multiple sclerosis) ([facility]).    SURGICAL HISTORY   has a past surgical history that includes cyst excision (Right).    SOCIAL HISTORY  Social History     Tobacco Use   • Smoking status: Never Smoker   • Smokeless tobacco: Never Used   Substance Use Topics   • Alcohol use: Yes     Comment: Socially   • Drug use: Yes     Types: Inhaled, Marijuana     Comment: daily marijuana      Social History     Substance and Sexual Activity   Drug Use Yes   • Types: Inhaled, Marijuana    Comment: daily marijuana       FAMILY HISTORY  Family History   Problem Relation Age of Onset   • Hypertension Mother    • Cancer Mother         cervical   • No Known Problems Sister    • No Known Problems Brother    • Cancer Maternal Aunt         Leiomyosarcoma   • No Known Problems Brother    • No Known Problems Sister    • No Known Problems Daughter    • No Known  "Problems Daughter        CURRENT MEDICATIONS  Home Medications     Reviewed by Rianna Sherman R.N. (Registered Nurse) on 01/01/20 at 1147  Med List Status: Complete   Medication Last Dose Status   GABAPENTIN PO  Active   Teriflunomide (AUBAGIO) 7 MG Tab 12/31/2019 Active                ALLERGIES  Allergies   Allergen Reactions   • Pcn [Penicillins]      Hives         PHYSICAL EXAM  VITAL SIGNS: /72   Pulse (!) 107   Temp 37.2 °C (99 °F) (Temporal)   Resp 18   Ht 1.575 m (5' 2\")   Wt 41 kg (90 lb 4.8 oz)   LMP 12/12/2019 (Exact Date)   SpO2 100%   BMI 16.52 kg/m²     Constitutional: Well developed, Well nourished, No acute distress, Non-toxic appearance.   HENT: Soft tissue swelling along the maxilla on the right side, no dental tenderness throughout, poster oropharynx is normal, sublingual and submandibular is normal, TMs are normal, no definite trismus, Normocephalic, Atraumatic  Eyes: nonicteric  Neck: Supple, no meningismus  Cardiovascular: Regular pulse  Lungs: No respiratory distress  Skin: Warm, Dry, no rash  Extremities: No edema  Neurologic: Alert, appropriate, follows commands  Psychiatric: Affect normal     DIAGNOSTIC STUDIES / PROCEDURES    LABS  Results for orders placed or performed during the hospital encounter of 01/01/20   CBC WITH DIFFERENTIAL   Result Value Ref Range    WBC 12.1 (H) 4.8 - 10.8 K/uL    RBC 4.15 (L) 4.20 - 5.40 M/uL    Hemoglobin 13.3 12.0 - 16.0 g/dL    Hematocrit 41.2 37.0 - 47.0 %    MCV 99.3 (H) 81.4 - 97.8 fL    MCH 32.0 27.0 - 33.0 pg    MCHC 32.3 (L) 33.6 - 35.0 g/dL    RDW 44.6 35.9 - 50.0 fL    Platelet Count 236 164 - 446 K/uL    MPV 9.3 9.0 - 12.9 fL    Neutrophils-Polys 71.80 44.00 - 72.00 %    Lymphocytes 21.80 (L) 22.00 - 41.00 %    Monocytes 4.30 0.00 - 13.40 %    Eosinophils 1.50 0.00 - 6.90 %    Basophils 0.40 0.00 - 1.80 %    Immature Granulocytes 0.20 0.00 - 0.90 %    Nucleated RBC 0.00 /100 WBC    Neutrophils (Absolute) 8.67 (H) 2.00 - 7.15 K/uL "    Lymphs (Absolute) 2.63 1.00 - 4.80 K/uL    Monos (Absolute) 0.52 0.00 - 0.85 K/uL    Eos (Absolute) 0.18 0.00 - 0.51 K/uL    Baso (Absolute) 0.05 0.00 - 0.12 K/uL    Immature Granulocytes (abs) 0.03 0.00 - 0.11 K/uL    NRBC (Absolute) 0.00 K/uL   BASIC METABOLIC PANEL   Result Value Ref Range    Sodium 137 135 - 145 mmol/L    Potassium 3.8 3.6 - 5.5 mmol/L    Chloride 106 96 - 112 mmol/L    Co2 23 20 - 33 mmol/L    Glucose 107 (H) 65 - 99 mg/dL    Bun 11 8 - 22 mg/dL    Creatinine 0.78 0.50 - 1.40 mg/dL    Calcium 9.0 8.5 - 10.5 mg/dL    Anion Gap 8.0 0.0 - 11.9   ESTIMATED GFR   Result Value Ref Range    GFR If African American >60 >60 mL/min/1.73 m 2    GFR If Non African American >60 >60 mL/min/1.73 m 2      All labs reviewed by me.    RADIOLOGY  CT-MAXILLOFACIAL WITH PLUS RECONS   Final Result      Dental disease as detailed above. Markedly diseased right maxillary second premolar with periapical lucency and cortical destruction, with associated 17 x 12 x 16 mm subperiosteal abscess. Right facial cellulitis.        The radiologist's interpretation of all radiological studies have been reviewed by me.    COURSE & MEDICAL DECISION MAKING  Nursing notes, VS, PMSFHx reviewed in chart.     12:03 PM Patient seen and examined at bedside. Ordered for CT maxillofacial, CBC, and BMP to evaluate. Patient will be treated with dilaudid 0.5 mg, zofran 4 mg and cleocin 600 mg for her symptoms. Discussed with the patient a CT will be ordered to evaluate for abscess/infection.     1:30 PM - Reviewed the patient's lab and radiology results. Paged oral surgery.     1:41 PM I discussed the patient's case and the above findings with Dr. Ansari (Oral surgery) who consulted about facial surgery tonight.     1:43 PM Paged Hospitalist.          Decision Making:  This is a 29 y.o. year old female who presents with a facial cellulitis and abscess.  It appears to be involving the second premolar on the right maxilla.  The case was  discussed with Dr. Lerma who will take the patient the operating room tonight.  Patient otherwise will be n.p.o.  She will be written for antibiotics and fluids.  She will be admitted by the hospitalist, Dr. Maynard.      DISPOSITION:  Patient will be admitted to Dr. Maynard (hospitalist) in guarded condition.     FINAL IMPRESSION  1. Facial cellulitis    2. Dental abscess          Heraclio MARIANO (Scribe), am scribing for, and in the presence of, Yomi Forbes M.D..    Electronically signed by: Heraclio Barros (Scribe), 1/1/2020    Yomi MARIANO M.D. personally performed the services described in this documentation, as scribed by Heraclio Barros in my presence, and it is both accurate and complete. C.     The note accurately reflects work and decisions made by me.  Yomi Forbes  1/1/2020  1:50 PM

## 2020-01-02 VITALS
HEIGHT: 62 IN | RESPIRATION RATE: 19 BRPM | DIASTOLIC BLOOD PRESSURE: 54 MMHG | HEART RATE: 74 BPM | OXYGEN SATURATION: 99 % | WEIGHT: 90.3 LBS | BODY MASS INDEX: 16.62 KG/M2 | SYSTOLIC BLOOD PRESSURE: 100 MMHG | TEMPERATURE: 97.8 F

## 2020-01-02 DIAGNOSIS — G35 MULTIPLE SCLEROSIS (HCC): ICD-10-CM

## 2020-01-02 PROCEDURE — 700111 HCHG RX REV CODE 636 W/ 250 OVERRIDE (IP): Performed by: HOSPITALIST

## 2020-01-02 PROCEDURE — 700102 HCHG RX REV CODE 250 W/ 637 OVERRIDE(OP): Performed by: HOSPITALIST

## 2020-01-02 PROCEDURE — 99239 HOSP IP/OBS DSCHRG MGMT >30: CPT | Performed by: INTERNAL MEDICINE

## 2020-01-02 PROCEDURE — A9270 NON-COVERED ITEM OR SERVICE: HCPCS | Performed by: HOSPITALIST

## 2020-01-02 PROCEDURE — 700101 HCHG RX REV CODE 250: Performed by: HOSPITALIST

## 2020-01-02 RX ORDER — CLINDAMYCIN HYDROCHLORIDE 300 MG/1
300 CAPSULE ORAL EVERY 6 HOURS
Qty: 40 CAP | Refills: 0 | Status: SHIPPED | OUTPATIENT
Start: 2020-01-02 | End: 2020-01-12

## 2020-01-02 RX ORDER — NICOTINE 14MG/24HR
1 PATCH, TRANSDERMAL 24 HOURS TRANSDERMAL 2 TIMES DAILY WITH MEALS
Qty: 30 CAP | Refills: 0 | Status: SHIPPED | OUTPATIENT
Start: 2020-01-02 | End: 2020-06-30

## 2020-01-02 RX ORDER — ONDANSETRON 4 MG/1
4 TABLET, ORALLY DISINTEGRATING ORAL EVERY 4 HOURS PRN
Qty: 10 TAB | Refills: 0 | Status: SHIPPED | OUTPATIENT
Start: 2020-01-02 | End: 2020-05-26

## 2020-01-02 RX ORDER — AMOXICILLIN 250 MG
2 CAPSULE ORAL 2 TIMES DAILY
Qty: 30 TAB | Refills: 0 | Status: SHIPPED | OUTPATIENT
Start: 2020-01-02 | End: 2020-05-26

## 2020-01-02 RX ORDER — POLYETHYLENE GLYCOL 3350 17 G/17G
17 POWDER, FOR SOLUTION ORAL
Qty: 1 EACH | Refills: 3 | Status: SHIPPED | OUTPATIENT
Start: 2020-01-02 | End: 2020-05-26

## 2020-01-02 RX ORDER — ACETAMINOPHEN 325 MG/1
650 TABLET ORAL EVERY 6 HOURS PRN
Qty: 30 TAB | Refills: 0 | Status: SHIPPED | OUTPATIENT
Start: 2020-01-02 | End: 2020-05-26

## 2020-01-02 RX ORDER — SODIUM CHLORIDE 9 MG/ML
INJECTION, SOLUTION INTRAVENOUS
Status: DISCONTINUED
Start: 2020-01-02 | End: 2020-01-02 | Stop reason: HOSPADM

## 2020-01-02 RX ORDER — POLYETHYLENE GLYCOL 3350 17 G/17G
17 POWDER, FOR SOLUTION ORAL
Refills: 3 | COMMUNITY
Start: 2020-01-02 | End: 2020-01-02

## 2020-01-02 RX ORDER — OXYCODONE HYDROCHLORIDE 5 MG/1
5-10 TABLET ORAL EVERY 6 HOURS PRN
Qty: 12 TAB | Refills: 0 | Status: SHIPPED | OUTPATIENT
Start: 2020-01-02 | End: 2020-01-05

## 2020-01-02 RX ADMIN — MORPHINE SULFATE 4 MG: 4 INJECTION INTRAVENOUS at 01:38

## 2020-01-02 RX ADMIN — PROCHLORPERAZINE EDISYLATE 10 MG: 5 INJECTION INTRAMUSCULAR; INTRAVENOUS at 04:18

## 2020-01-02 RX ADMIN — MORPHINE SULFATE 4 MG: 4 INJECTION INTRAVENOUS at 02:42

## 2020-01-02 RX ADMIN — ONDANSETRON 4 MG: 2 INJECTION INTRAMUSCULAR; INTRAVENOUS at 02:46

## 2020-01-02 RX ADMIN — PROMETHAZINE HYDROCHLORIDE 12.5 MG: 25 TABLET ORAL at 03:31

## 2020-01-02 RX ADMIN — CLINDAMYCIN IN 5 PERCENT DEXTROSE 600 MG: 12 INJECTION, SOLUTION INTRAVENOUS at 05:48

## 2020-01-02 RX ADMIN — MORPHINE SULFATE 4 MG: 4 INJECTION INTRAVENOUS at 00:38

## 2020-01-02 NOTE — PROGRESS NOTES
Pt tolerated extractions and I&D well. Excellent prognosis for recovery . When pt is medically stable, OK to dc from OMFS standpoint.  Pt can f/u with my office in 1 week . Recommend dc with Clindamycin , pain meds and peridex.      Thank you,  Lefty Ansari, DDS  [phone]

## 2020-01-02 NOTE — DISCHARGE PLANNING
Medicaid Meds-to-Beds: Discharge prescription orders listed below delivered to patient's bedside. RN notified. Patient counseled. Informed patient that probiotic is not covered by insurance and is available OTC. Clarified order for miralax, patient states she does not need it at this time but will contact [facility] pharmacy if needed.      Nanci Mccormack Rosamaureen   Home Medication Instructions VERONICA:[id number]    Printed on:01/02/20 0048   Medication Information                      acetaminophen (TYLENOL) 325 MG Tab  Take 2 Tabs by mouth every 6 hours as needed (Mild Pain; (Pain scale 1-3); Temp greater than 100.5 F).             clindamycin (CLEOCIN) 300 MG Cap  Take 1 Cap by mouth every 6 hours for 10 days.             ondansetron (ZOFRAN ODT) 4 MG TABLET DISPERSIBLE  Take 1 Tab by mouth every four hours as needed for Nausea (give PO if no IV route available).             senna-docusate (PERICOLACE OR SENOKOT S) 8.6-50 MG Tab  Take 2 Tabs by mouth 2 Times a Day.               Gaby Contreras, PharmD

## 2020-01-02 NOTE — CONSULTS
Oral & Facial Surgery   Consultation Note    ID:  Nanci Mccormack     HPI:  Pt with multi day hx of pain and swelling in the face.      Chief Complaint:  Facial swelling, tooth pain    Exam:   Gen:  AAO x 3, NAD  Head:  NCAT  Eyes:  PERRLA, EOMI  Ears:  EAC Clear  Nose:  Nares patent, no d/c, no heme  Mouth:  shaneka 20 Mm, difficult to exam.  Appears to be teeth 4, 14 & 19 with gross decay present.  Tenderness to palpation on teeth as well.  Swelling in area of right maxilla.  Tissue locally erythematous, induration present  Throat:  OP Clear    Imaging:   Maxillofacial CT:  Abscess located at right maxilla as well as in left mandible and left maxilla.    Pano:  pending    Assessment:  Facial abscess 2/2 odontogenic infection  Will need to do more thorough exam when asleep    Plan:    To OR for I&D with exts    Thank You,  Lefty Ansari, MAYNORS  [phone]

## 2020-01-02 NOTE — ANESTHESIA PROCEDURE NOTES
Airway  Date/Time: 1/1/2020 9:07 PM  Performed by: Rickie Samuels M.D.  Authorized by: Rickie Samuels M.D.     Location:  OR  Urgency:  Elective  Indications for Airway Management:  Anesthesia  Spontaneous Ventilation: absent    Sedation Level:  Deep  Preoxygenated: Yes    Patient Position:  Sniffing  Final Airway Type:  Endotracheal airway  Final Endotracheal Airway:  ETT  Cuffed: Yes    Technique Used for Successful ETT Placement:  Direct laryngoscopy  Insertion Site:  Oral  Blade Type:  Anya  Laryngoscope Blade/Videolaryngoscope Blade Size:  3  ETT Size (mm):  6.5  Measured from:  Teeth  ETT to Teeth (cm):  18  Placement Verified by: auscultation and capnometry    Cormack-Lehane Classification:  Grade IIb - view of arytenoids or posterior of glottis only  Number of Attempts at Approach:  1

## 2020-01-02 NOTE — ANESTHESIA PREPROCEDURE EVALUATION
Relevant Problems   No relevant active problems       Physical Exam    Airway   Mallampati: II  TM distance: >3 FB  Neck ROM: full       Cardiovascular - normal exam  Rhythm: regular  Rate: normal  (-) murmur     Dental - normal exam         Pulmonary - normal exam  Breath sounds clear to auscultation     Abdominal    Neurological - normal exam                 Anesthesia Plan    ASA 2- EMERGENT       Plan - general             Induction: intravenous    Postoperative Plan: Postoperative administration of opioids is intended.    Pertinent diagnostic labs and testing reviewed    Informed Consent:    Anesthetic plan and risks discussed with patient.    Use of blood products discussed with: patient whom consented to blood products.       decreased mouth opening

## 2020-01-02 NOTE — PROGRESS NOTES
Patient arrived on floor via wheelchair. Patient ambulatory to bed. Patient stating pain to right upper mouth is an 8/10. Visible swelling to cheek. Patient oriented to room, unit routine, and call light. , Wayne, at bedside. Will update patient on surgery time.

## 2020-01-02 NOTE — H&P
Hospital Medicine History & Physical Note    Date of Service  1/1/2020    Primary Care Physician  Geneva Granados M.D.    Consultants  Dr. Ansari, oral surgery    Code Status  full    Chief Complaint  Right facial pain    History of Presenting Illness  29 y.o. female who presented 1/1/2020 with right facial pain and swelling.  Ms. Mccormack has a past medical history of multiple sclerosis and chronic marijuana use that had a right lower wisdom tooth removed at [facility] 2 weeks ago though she does not know the name of the oral surgeon that did this.  She was on 5 days of antibiotics for that procedure.  About 2 days ago she developed pain and swelling on the right upper molar region that has been worsening.  She has been taking ibuprofen for pain.  She presented the emergency room with these complaints and here a CAT scan confirms an abscess therefore she will be treated with IV antibiotics, IV fluids, IV morphine and will be n.p.o. for surgery today by Dr. Ansari, oral surgery.  Her , Wayne is at bedside.  Discussed her condition at length.  Ms. Mccormack is very anxious to eat and drink after the procedure currently is in substantial pain.     Review of Systems  Review of Systems   Constitutional: Negative for chills and fever.   All other systems reviewed and are negative.      Past Medical History   has a past medical history of MS (multiple sclerosis) (HCC).    Surgical History   has a past surgical history that includes cyst excision (Right).     Family History  family history includes Cancer in her maternal aunt and mother; Hypertension in her mother; No Known Problems in her brother, brother, daughter, daughter, sister, and sister.     Social History   reports that she has never smoked. She has never used smokeless tobacco. She reports current alcohol use. She reports current drug use. Drugs: Inhaled and Marijuana.  She is     Allergies  Allergies   Allergen Reactions   • Pcn  [Penicillins]      Hives         Medications  Prior to Admission Medications   Prescriptions Last Dose Informant Patient Reported? Taking?   Teriflunomide (AUBAGIO) 7 MG Tab 12/31/2019 at AM Patient No No   Sig: Take 7 mg by mouth every day.   ibuprofen (MOTRIN) 200 MG Tab 1/1/2020 at 0200 Patient Yes Yes   Sig: Take 400 mg by mouth every 6 hours as needed.      Facility-Administered Medications: None       Physical Exam  Temp:  [36.6 °C (97.8 °F)-37.2 °C (99 °F)] 36.6 °C (97.8 °F)  Pulse:  [] 73  Resp:  [16-18] 16  BP: (106-129)/(70-89) 124/86  SpO2:  [99 %-100 %] 99 %    Physical Exam  Vitals signs and nursing note reviewed.   Constitutional:       Comments: She is very thin   HENT:      Head: Normocephalic and atraumatic.      Mouth/Throat:      Mouth: Mucous membranes are dry.      Pharynx: Oropharynx is clear.      Comments: Right upper molar with severe decay and swelling of the gum  Eyes:      General: No scleral icterus.     Conjunctiva/sclera: Conjunctivae normal.   Neck:      Musculoskeletal: Normal range of motion and neck supple.   Cardiovascular:      Rate and Rhythm: Normal rate and regular rhythm.      Heart sounds: No murmur.   Pulmonary:      Effort: No respiratory distress.      Breath sounds: Normal breath sounds.   Abdominal:      General: There is no distension.      Tenderness: There is no tenderness.   Musculoskeletal:      Right lower leg: No edema.      Left lower leg: No edema.   Skin:     General: Skin is warm and dry.   Neurological:      General: No focal deficit present.      Mental Status: She is oriented to person, place, and time.   Psychiatric:         Mood and Affect: Mood normal.         Behavior: Behavior normal.         Laboratory:  Recent Labs     01/01/20  1230   WBC 12.1*   RBC 4.15*   HEMOGLOBIN 13.3   HEMATOCRIT 41.2   MCV 99.3*   MCH 32.0   MCHC 32.3*   RDW 44.6   PLATELETCT 236   MPV 9.3     Recent Labs     01/01/20  1230   SODIUM 137   POTASSIUM 3.8   CHLORIDE 106    CO2 23   GLUCOSE 107*   BUN 11   CREATININE 0.78   CALCIUM 9.0     Recent Labs     01/01/20  1230   GLUCOSE 107*         No results for input(s): NTPROBNP in the last 72 hours.      No results for input(s): TROPONINT in the last 72 hours.    Urinalysis:    No results found     Imaging:  [id number]   Final Result      Multiple dental caries with apparent right upper second premolar and left lower premolar periapical abscesses.      CT-MAXILLOFACIAL WITH PLUS RECONS   Final Result      Dental disease as detailed above. Markedly diseased right maxillary second premolar with periapical lucency and cortical destruction, with associated 17 x 12 x 16 mm subperiosteal abscess. Right facial cellulitis.            Assessment/Plan:  I anticipate this patient will require at least two midnights for appropriate medical management, necessitating inpatient admission.    * Dental abscess- (present on admission)  Assessment & Plan  Right sided subperiosteal abscess of the upper molars noted on CT scan  She has a penicillin allergy therefore will be treated with IV clindamycin  IV fluids and IV morphine while she is n.p.o. for surgery today by Dr. Ansari  White blood cell count is 12.1      Multiple sclerosis (HCC)- (present on admission)  Assessment & Plan  History of multiple sclerosis for which she is on Teriflunomide which will be continued.   She was first diagnosed in 2012  She was followed by Dr. Cheung prior to his passing and then was seen by Dr. Russo who has now left town  She will need to follow-up with a new neurologist after discharge      VTE prophylaxis: SCDs

## 2020-01-02 NOTE — OP REPORT
Operative Report  Oral & Facial Surgery  Dental Extractions    Pt Name:  Nanci Mccormack     Date of Surgery: 1/1/2020     Surgeon:  Lefty Ansari DDS    Assistant: None    Pre-Op Diagnosis:     Dental Decay on Teeth #s  4,15,19              Oral Abscess    Post Op Diagnosis:       Same    Operation Performed:           Surigcal Extraction of teeth # 4,15,19    Alveoloplasty (1-3 teeth) UR Quad   Complicated Intraoral I&D    Description of Surgery    The pt was brought to the operating room by the anesthesia team.  A time out was performed and consents were verified.  The pt was then uneventfully induced into general anesthesia.  A endotracheal tube was inserted and secured by the anesthesia team.  An oropharyngeal throat pack was placed.  A more thorough exam was performed and it was found that teeth #s 4,15, & 19 were severely decayed and fractured . Large lateral maxillary abscess on #4.  Approximately 10 mL of 1% lidocaine with 1:100K epi was administered to the proposed surgical sites.     A 15 blade was used to make an incision on the maxillary right vestibule in the area of #4.  Blunt dissection was then carried out to the bone and 5 mL of purulence was encountered.  Cultured and sent to micro . Copious irrigation was placed in the wound until clear return was encountered.      A 15 blade was used to make a sulcular incision along the sites to be extracted.  A full thickness mucoperiosteal flap was elevated.  Teeth #s 4,15, 19 were removed with appropriate bone removal and sectioning with forceps and elevators.  Rongeurs and bone files were used to smooth the alveolus of the bone.  Extra attention directed to #4 area due to sever bone erosion. Copious irrigation was placed in the surgical sites.      The throat pack was then removed and hemostasis achieved.  The pt was then turned back over to the anesthesia team, was awakened and extubated uneventfully and taken to the PACU in stable condition.       Estimated Blood Loss:  10 mL  Needle and sponge count:  Correct  Specimens Removed:  Teeth #s 4,15,19     Lefty Ansari DDS

## 2020-01-02 NOTE — ANESTHESIA POSTPROCEDURE EVALUATION
Patient: Nanci Mccormack    Procedure Summary     Date:  01/01/20 Room / Location:  [facility] 10 / SURGERY [facility]    Anesthesia Start:  2100 Anesthesia Stop:  2131    Procedures:       INCISION AND DRAINAGE, ABSCESS, SUBMANDIBULAR REGION (Mouth)      EXTRACTION, TOOTH (Mouth) Diagnosis:  (dental abscess)    Surgeon:  Lefty Ansari D.D.S. Responsible Provider:  Rickie Samuels M.D.    Anesthesia Type:  general ASA Status:  2 - Emergent          Final Anesthesia Type: general  Last vitals  BP   Blood Pressure: 134/90    Temp   36.1 °C (97 °F)    Pulse   Pulse: 80   Resp   14    SpO2   99 %      Anesthesia Post Evaluation    Patient location during evaluation: PACU  Patient participation: complete - patient participated  Level of consciousness: awake and alert  Pain score: 0    Airway patency: patent  Anesthetic complications: no  Cardiovascular status: hemodynamically stable  Respiratory status: acceptable  Hydration status: euvolemic    PONV: none           Nurse Pain Score: 8 (NPRS)

## 2020-01-02 NOTE — ASSESSMENT & PLAN NOTE
Right sided subperiosteal abscess of the upper molars noted on CT scan  She has a penicillin allergy therefore will be treated with IV clindamycin  IV fluids and IV morphine while she is n.p.o. for surgery today by Dr. Ansari  White blood cell count is 12.1

## 2020-01-02 NOTE — DISCHARGE PLANNING
Patient is eligible for Medicaid Meds to Beds at discharge if they have coverage with [place] [employer], Medicaid FFS, Medicaid HMO ([facility]), or [place]. This service is provided through the [facility] if orders are received prior to 4 p.m. Monday through Friday, excluding holidays. Please call [phone] prior to discharge.

## 2020-01-02 NOTE — ASSESSMENT & PLAN NOTE
History of multiple sclerosis for which she is on Teriflunomide which will be continued.   She was first diagnosed in 2012  She was followed by Dr. Cheung prior to his passing and then was seen by Dr. Russo who has now left town  She will need to follow-up with a new neurologist after discharge

## 2020-01-02 NOTE — DISCHARGE SUMMARY
Discharge Summary    CHIEF COMPLAINT ON ADMISSION  Chief Complaint   Patient presents with   • Facial Swelling     R upper jaw x4 days       Reason for Admission  Swollen Cheek      Admission Date  1/1/2020    CODE STATUS  Full Code    HPI & HOSPITAL COURSE  This is a 29 y.o. female here with Facial Swelling (R upper jaw x4 days)    Please review Dr. Manohar Maynard M.D. notes for further details of history of present illness, past medical/social/family histories, allergies and medications. Please review Dr. Ansari consultation notes.  History of multiple sclerosis  Chornic marijuana use  S/p R wisdom removal 2wks ago at [facility]  Presents with Facial Swelling (R upper jaw x4 days)  At the ED afebrile, hemodynamically stable.  CT showed:  Dental disease as detailed above. Markedly diseased right maxillary second premolar with periapical lucency and cortical destruction, with associated 17 x 12 x 16 mm subperiosteal abscess. Right facial cellulitis.  Leukocytosis  Dr. Ansari, Oral Surgery consulted.  She underwent:  Surigcal Extraction of teeth # 4,15,19   Alveoloplasty (1-3 teeth) UR Quad  Complicated Intraoral I&D  For:  Dental Decay on Teeth #s  4,15,19  Oral Abscess  By:  Dr. Ansari, 1/1.  She tolerated procedure, Diet was advanced. Dr. Ansari recommended a course of clindamycin and discharge with follow up to his clinic. He cleared her for discharge.    UPDATE:  I called her and got her mother on the phone to discuss about her BMI of 16. I recommended Boost and encourage PO to gain weight. Her mother mentioned she is already on supplements and will try to continue to encourage her. I have notified her primary provider and he is aware as well.     At discharge date, Nanci Mccormack afebrile and hemodynamically stable.  Nanci Mccormack wanted to be discharged today.    Discharge Physical Exam  Vitals signs and nursing note reviewed.   Constitutional:       Comments: She is very thin    HENT:      Head: Normocephalic and atraumatic.      Mouth/Throat:      Mouth: Mucous membranes are dry.      Pharynx: Oropharynx is clear.      Comments: Mild R facial swelling and tenderness  Eyes:      General: No scleral icterus.     Conjunctiva/sclera: Conjunctivae normal.   Neck:      Musculoskeletal: Normal range of motion and neck supple.   Cardiovascular:      Rate and Rhythm: Normal rate and regular rhythm.      Heart sounds: No murmur.   Pulmonary:      Effort: No respiratory distress.      Breath sounds: Normal breath sounds.   Abdominal:      General: There is no distension.      Tenderness: There is no tenderness.   Musculoskeletal:      Right lower leg: No edema.      Left lower leg: No edema.   Skin:     General: Skin is warm and dry.   Neurological:      General: No focal deficit present.      Mental Status: She is oriented to person, place, and time.   Psychiatric:         Mood and Affect: Mood normal.         Behavior: Behavior normal.     Imaging  [id number]   Final Result      Multiple dental caries with apparent right upper second premolar and left lower premolar periapical abscesses.      CT-MAXILLOFACIAL WITH PLUS RECONS   Final Result      Dental disease as detailed above. Markedly diseased right maxillary second premolar with periapical lucency and cortical destruction, with associated 17 x 12 x 16 mm subperiosteal abscess. Right facial cellulitis.                      Therefore, she is discharged in good and stable condition to home with close outpatient follow-up.    The patient met 2-midnight criteria for an inpatient stay at the time of discharge.    Discharge Date  1/2/2020    FOLLOW UP ITEMS POST DISCHARGE      DISCHARGE DIAGNOSES  Principal Problem:    Dental abscess POA: Yes  Active Problems:    Multiple sclerosis (HCC) POA: Yes      FOLLOW UP  No future appointments.  Geneva Granados M.D.  [address] [place] [id number]  [phone]    In 1  week      Lefty Ansari D.D.S.  [address]  [place] [address]  [phone]    In 1 week    Follow upwith Geneva Granados M.D. in 1 week. She will need referral to a new Neurologist as Dr. Russo has moved.  Follow up with Dr. Ansari, Oral Surgery in 1 week  Avoid swimming or driving if on narcotics    MEDICATIONS ON DISCHARGE     Medication List      START taking these medications      Instructions   acetaminophen 325 MG Tabs  Commonly known as:  TYLENOL   Take 2 Tabs by mouth every 6 hours as needed (Mild Pain; (Pain scale 1-3); Temp greater than 100.5 F).  Dose:  650 mg     clindamycin 300 MG Caps  Commonly known as:  CLEOCIN   Take 1 Cap by mouth every 6 hours for 10 days.  Dose:  300 mg     ondansetron 4 MG Tbdp  Commonly known as:  ZOFRAN ODT   Take 1 Tab by mouth every four hours as needed for Nausea (give PO if no IV route available).  Dose:  4 mg     oxyCODONE immediate-release 5 MG Tabs  Commonly known as:  ROXICODONE   Take 1-2 Tabs by mouth every 6 hours as needed for Severe Pain for up to 3 days.  Dose:  5-10 mg     polyethylene glycol/lytes Pack  Commonly known as:  MIRALAX   Take 1 Packet by mouth 1 time daily as needed (if sennosides and docusate ineffective after 24 hours).  Dose:  17 g     Probiotic 250 MG Caps   Take 1 Cap by mouth 2 times a day, with meals.  Dose:  1 Cap     senna-docusate 8.6-50 MG Tabs  Commonly known as:  PERICOLACE or SENOKOT S   Take 2 Tabs by mouth 2 Times a Day.  Dose:  2 Tab        CONTINUE taking these medications      Instructions   ibuprofen 200 MG Tabs  Commonly known as:  MOTRIN   Take 400 mg by mouth every 6 hours as needed.  Dose:  400 mg     Teriflunomide 7 MG Tabs  Commonly known as:  AUBAGIO   Take 7 mg by mouth every day.  Dose:  7 mg        In prescribing controlled substances to this patient, I certify that I have obtained and reviewed the medical history of Nanci Mccormack. I have also made a good shraddha effort to obtain applicable records from  other providers who have treated the patient and records did not demonstrate any increased risk of substance abuse that would prevent me from prescribing controlled substances.     I have conducted a physical exam and documented it. I have reviewed Ms. Mccormack’s prescription history as maintained by the [place] Prescription Monitoring Program.     I have assessed the patient’s risk for abuse, dependency, and addiction using the validated Opioid Risk Tool available at https://www.mdcalc.com/chvdny-kjsl-sqmb-ort-narcotic-abuse.     Given the above, I believe the benefits of controlled substance therapy outweigh the risks. The reasons for prescribing controlled substances include non-narcotic, oral analgesic alternatives have been inadequate for pain control. Accordingly, I have discussed the risk and benefits, treatment plan, and alternative therapies with the patient.         Allergies  Allergies   Allergen Reactions   • Pcn [Penicillins]      Hives         DIET  Orders Placed This Encounter   Procedures   • Diet Order Low Fiber(GI Soft)     Standing Status:   Standing     Number of Occurrences:   1     Order Specific Question:   Diet:     Answer:   Low Fiber(GI Soft) [2]       ACTIVITY  No heavy lifting or strenuous activity    CONSULTATIONS  Oral Surgery    PROCEDURES  Ct-maxillofacial With Plus Recons    Result Date: 1/1/2020 1/1/2020 12:49 PM HISTORY/REASON FOR EXAM:  right facial sts. TECHNIQUE/EXAM DESCRIPTION AND NUMBER OF VIEWS:  CT scan of the maxillofacial with contrast, with reconstructions. Thin-section helical imaging was obtained of the maxillofacial structures from the orbital roofs through the mandible. Coronal and sagittal multiplanar volume reformat images were generated from the axial data., 80 mL of Omnipaque 350 nonionic contrast was injected intravenously. Low dose optimization technique was utilized for this CT exam including automated exposure control and adjustment of the mA and/or kV  according to patient size. COMPARISON:  None. FINDINGS: Right facial subcutaneous fat infiltration consistent with history of cellulitis. There is a significantly diseased right maxillary second premolar with a large dental caries, periapical lucency with associated bony destruction of the alveolar ridge. There is an associated subperiosteal abscess measuring about 17 x 12 x 16 mm. There are some mildly prominent and asymmetric right cervical lymph nodes which are likely reactive. Multiple additional dental caries are seen. Periapical lucencies adjacent to left maxillary molars. Mucosal thickening in the maxillary sinuses and small mucous retention cyst in right maxillary sinus. No sinus air-fluid levels. Mastoids are clear. Visualized intracranial structures are within normal limits.     Dental disease as detailed above. Markedly diseased right maxillary second premolar with periapical lucency and cortical destruction, with associated 17 x 12 x 16 mm subperiosteal abscess. Right facial cellulitis.    [id number]    Result Date: 1/1/2020 1/1/2020 8:14 PM HISTORY/REASON FOR EXAM:  Jaw Pain. Atraumatic right-sided jaw pain and swelling TECHNIQUE/EXAM DESCRIPTION AND NUMBER OF VIEWS:  1 view(s) of the mandible. COMPARISON:  CT scan same day FINDINGS: There is bilateral upper ventral caries as well as left lower dental caries. There is lucency about the right upper second premolar as noted on CT scan. There also appears to be some lucency about left lower premolars.. No evidence of mandible fracture.     Multiple dental caries with apparent right upper second premolar and left lower premolar periapical abscesses.    Please see Dr. Ansari's OP NOTE    LABORATORY  Lab Results   Component Value Date    SODIUM 137 01/01/2020    POTASSIUM 3.8 01/01/2020    CHLORIDE 106 01/01/2020    CO2 23 01/01/2020    GLUCOSE 107 (H) 01/01/2020    BUN 11 01/01/2020    CREATININE 0.78 01/01/2020        Lab Results   Component  Value Date    WBC 12.1 (H) 01/01/2020    HEMOGLOBIN 13.3 01/01/2020    HEMATOCRIT 41.2 01/01/2020    PLATELETCT 236 01/01/2020        Total time of the discharge process exceeds 40 minutes.

## 2020-01-02 NOTE — DISCHARGE INSTRUCTIONS
Discharge Instructions    Discharged to home by car with relative. Discharged via walking, hospital escort: Refused.  Special equipment needed: Not Applicable    Be sure to schedule a follow-up appointment with your primary care doctor or any specialists as instructed.     Discharge Plan:   Diet Plan: Discussed  Activity Level: Discussed  Confirmed Follow up Appointment: Patient to Call and Schedule Appointment  Confirmed Symptoms Management: Discussed  Medication Reconciliation Updated: Yes  Influenza Vaccine Indication: Patient Refuses    I understand that a diet low in cholesterol, fat, and sodium is recommended for good health. Unless I have been given specific instructions below for another diet, I accept this instruction as my diet prescription.   Other diet: Regular as tolerated    Special Instructions: None    · Is patient discharged on Warfarin / Coumadin?   No     Depression / Suicide Risk    As you are discharged from [facility], it is important to learn how to keep safe from harming yourself.    Recognize the warning signs:  · Abrupt changes in personality, positive or negative- including increase in energy   · Giving away possessions  · Change in eating patterns- significant weight changes-  positive or negative  · Change in sleeping patterns- unable to sleep or sleeping all the time   · Unwillingness or inability to communicate  · Depression  · Unusual sadness, discouragement and loneliness  · Talk of wanting to die  · Neglect of personal appearance   · Rebelliousness- reckless behavior  · Withdrawal from people/activities they love  · Confusion- inability to concentrate     If you or a loved one observes any of these behaviors or has concerns about self-harm, here's what you can do:  · Talk about it- your feelings and reasons for harming yourself  · Remove any means that you might use to hurt yourself (examples: pills, rope, extension cords, firearm)  · Get professional help from  the community (Mental Health, Substance Abuse, psychological counseling)  · Do not be alone:Call your Safe Contact- someone whom you trust who will be there for you.  · Call your local CRISIS HOTLINE 599-1470 or 159-581-3580  · Call your local Children's Mobile Crisis Response Team [place] (951) 459-7952 or www.[employer]  · Call the toll free National Suicide Prevention Hotlines   · National Suicide Prevention Lifeline 822-546-GVLM (8908)  · National Hope Line Network 800-SUICIDE (598-9503)    Discharge Instructions per Toño Martinez M.D.    DIET: Resume previous diet  Healthy diet. Plenty of vegetables.    ACTIVITY: Avoid strenuous activity or heavy lifting.     DIAGNOSIS:   Patient Active Problem List   Diagnosis   • Dental Abscess    Multiple sclerosis (HCC)   • Chronic pain syndrome   • Encounter for initial prescription of injectable contraceptive   • Marijuana use   • Spasticity   • Chronic low back pain without sciatica   • Moderate single current episode of major depressive disorder (HCC)   •        Return to ER in the event of new or worsening symptoms. Please note importance of compliance and the patient has agreed to proceed with all medical recommendations and follow up plan indicated above. All medications come with benefits and risks. Risks may include permanent injury or death and these risks can be minimized with close reassessment and monitoring. Please make it to your scheduled follow ups with Geneva Granados M.D., and/or specialists clinic.  Follow upwith Geneva Granados M.D. in 1 week  Follow up with Dr. Ansari, Oral Surgery in 1 week  Avoid swimming or driving if on narcotics  Any fever, bleeding, more dental pain, facial swelling please see a doctor or go to the ER.      Dental Extraction, Care After  These instructions give you information about caring for yourself after your procedure. Your doctor may also give you more specific instructions. Call your doctor if you have  any problems or questions after your procedure.  HOME CARE  Lifestyle    · Protect the area where your tooth was removed. Do this even if you do not have any pain.  · Do not smoke, do not spit, and do not drink through a straw until your doctor says it is okay.  · Eat soft foods as told by your doctor. Avoid hot drinks and spicy foods until your gum has healed.  Cut (Incision) Care  · Follow instructions from your doctor about:  ¨ How to take care of the cut that was made in your gum.  ¨ When and how to change gauze.  ¨ When and how to take gauze out of your mouth.  ¨ Having your stitches (sutures) removed.  · Do not chew on the gauze.  · If your gum is bleeding a lot, fold a clean piece of gauze, place it on the bleeding gum, and bite on it as told by your doctor.  General Instructions   · Take medicines only as told by your doctor.  · Do not rinse your mouth until your doctor says it is okay. Once you can rinse your mouth, it is important to rinse very gently.  ¨ You may rinse your mouth with warm salt water once your doctor says it is okay. You can make a salt rinse by mixing one teaspoon of salt in two cups of warm water. Do this as told by your doctor.  · Do not brush or floss near where your tooth was removed until your doctor says it is okay. You may brush your other teeth.    · If directed, apply ice to your cheek on the side of your mouth where your tooth was removed:  ¨ Put ice in a plastic bag.  ¨ Place a towel between your skin and the bag.  ¨ Leave the ice on for 20 minutes, 2-3 times a day.  · Keep all follow-up visits as told by your doctor. This is important.  GET HELP IF:  · Your medicine is not helping your pain.  · You have a fever and you also have any of these:  ¨ A sick feeling in your stomach (nausea).  ¨ Throwing up (vomiting).  ¨ Chills.  · You have a very bad cough.  · You are short of breath.  GET HELP RIGHT AWAY IF:  · You have a lot of bleeding that does not stop.  · You have more  puffiness (swelling).  · You have very bad pain.  · You have fluid, blood, or pus coming from the gum where your tooth was removed.  · You have trouble swallowing.  · You cannot open your mouth.     This information is not intended to replace advice given to you by your health care provider. Make sure you discuss any questions you have with your health care provider.     Document Released: 06/07/2011 Document Revised: 05/03/2016 Document Reviewed: 12/14/2015  [employer] Interactive Patient Education ©2016 [employer]      Antibiotic Medicine  Antibiotic medicines are used to treat infections caused by bacteria. They work by injuring or killing the bacteria that is making you sick.  HOW IS AN ANTIBIOTIC CHOSEN?  An antibiotic is chosen based on many factors. To help your health care provider choose one for you, tell your health care provider if:  · You have any allergies.  · You are pregnant or plan to get pregnant.  · You are breastfeeding.  · You are taking any medicines. These include over-the-counter medicines, prescription medicines, and herbal remedies.  · You have a medical condition or problem you have not already discussed.  Your health care provider will also consider:  · How often the medicine has to be taken.  · Common side effects of the medicine.  · The cost of the medicine.  · The taste of the medicine.  If you have questions about why an antibiotic was chosen, make sure to ask.  FOR HOW LONG SHOULD I TAKE MY ANTIBIOTIC?  Continue to take your antibiotic for as long as told by your health care provider. Do not stop taking it when you feel better. If you stop taking it too soon:  · You may start to feel sick again.  · Your infection may become harder to treat.  · Complications may develop.  WHAT IF I MISS A DOSE?  Try not to miss any doses of medicine. If you miss a dose, take it as soon as possible. However, if it is almost time for the next dose:  · If you are taking 2 doses per day, take the missed  dose and the next dose 5 to 6 hours apart.  · If you are taking 3 or more doses per day, take the missed dose and the next dose 2 to 4 hours apart, then go back to the normal schedule.  If you cannot make up a missed dose, take the next scheduled dose on time. Then take the missed dose after you have taken all the doses as recommended by your health care provider, as if you had one more dose left.  DO ANTIBIOTICS AFFECT BIRTH CONTROL?  Birth control pills may not work while you are on antibiotics. If you are taking birth control pills, continue taking them as usual and use a second form of birth control, such as a condom, to avoid unwanted pregnancy. Continue using the second form of birth control until you are finished with your current 1 month cycle of birth control pills.  OTHER INFORMATION  · If there is any medicine left over, throw it away.  · Never take someone else's antibiotics.  · Never take leftover antibiotics.  SEEK MEDICAL CARE IF:  · You get worse.  · You do not feel better within a few days of starting the antibiotic medicine.  · You vomit.  · White patches appear in your mouth.  · You have new joint pain that begins after starting the antibiotic.  · You have new muscle aches that begin after starting the antibiotic.  · You had a fever before starting the antibiotic and it returns.  · You have any symptoms of an allergic reaction, such as an itchy rash. If this happens, stop taking the antibiotic.  SEEK IMMEDIATE MEDICAL CARE IF:  · Your urine turns dark or becomes blood-colored.  · Your skin turns yellow.  · You bruise or bleed easily.  · You have severe diarrhea and abdominal cramps.  · You have a severe headache.  · You have signs of a severe allergic reaction, such as:  ¨ Trouble breathing.  ¨ Wheezing.  ¨ Swelling of the lips, tongue, or face.  ¨ Fainting.  ¨ Blisters on the skin or in the mouth.  If you have signs of a severe allergic reaction, stop taking the antibiotic right away.  This  information is not intended to replace advice given to you by your health care provider. Make sure you discuss any questions you have with your health care provider.  Document Released: 08/30/2005 Document Revised: 09/07/2016 Document Reviewed: 05/04/2016  [employer] Interactive Patient Education © 2017 [employer] Inc.

## 2020-01-02 NOTE — PROGRESS NOTES
Discharge paperwork discussed and signed. All questions answered. Importance of antibiotics verbalized. Patient verbalized worsening signs and symptoms and when to return to ER or call MD. Patient opted to ambulate out with family.

## 2020-01-03 LAB
BACTERIA WND AEROBE CULT: NORMAL
GRAM STN SPEC: NORMAL
SIGNIFICANT IND 70042: NORMAL
SITE SITE: NORMAL
SOURCE SOURCE: NORMAL

## 2020-01-04 LAB
BACTERIA SPEC ANAEROBE CULT: NORMAL
SIGNIFICANT IND 70042: NORMAL
SITE SITE: NORMAL
SOURCE SOURCE: NORMAL

## 2020-01-09 ENCOUNTER — PATIENT OUTREACH (OUTPATIENT)
Dept: HEALTH INFORMATION MANAGEMENT | Facility: OTHER | Age: 30
End: 2020-01-09

## 2020-01-13 NOTE — PROGRESS NOTES
CHW Delroy outbound TC to Nanci's mother Lucie 1/13/20.  Lucie connected CHW with Nanci. Nanci provided with updated phone number [phone] and asked that CHW TC her tomorrow morning.     Plan:  CHW will update Nanci's phone number and TC her tomorrow for intake and follow-up questions.

## 2020-01-18 NOTE — PROGRESS NOTES
FRANCIA Potts outbound TC to pt. For Community Health Worker Intake on 1/17/20.  • Social determinates of health intake incomplete.   • Identified financial security barrier .  • Contact information provided to Nanci Mccormack.  • Pt. Does not have a PCP and declined assistance finding one.  • Referral to RN or SW declined at this time.       Pt. Reports receiving medications and having no questions or concerns on how to take them.  Pt. stated she would TC CCM for rest of SDOH intake (support system, living situation, and confidence managing health).    Plan:  FRANCIA potts will d/c pt. 1/29 if Nanci does not connect with CCM.

## 2020-01-23 SDOH — ECONOMIC STABILITY: FOOD INSECURITY: WITHIN THE PAST 12 MONTHS, YOU WORRIED THAT YOUR FOOD WOULD RUN OUT BEFORE YOU GOT MONEY TO BUY MORE.: NEVER TRUE

## 2020-01-23 SDOH — ECONOMIC STABILITY: FOOD INSECURITY: WITHIN THE PAST 12 MONTHS, THE FOOD YOU BOUGHT JUST DIDN'T LAST AND YOU DIDN'T HAVE MONEY TO GET MORE.: NEVER TRUE

## 2020-01-23 SDOH — ECONOMIC STABILITY: TRANSPORTATION INSECURITY
IN THE PAST 12 MONTHS, HAS THE LACK OF TRANSPORTATION KEPT YOU FROM MEDICAL APPOINTMENTS OR FROM GETTING MEDICATIONS?: NO

## 2020-01-23 SDOH — ECONOMIC STABILITY: INCOME INSECURITY: HOW HARD IS IT FOR YOU TO PAY FOR THE VERY BASICS LIKE FOOD, HOUSING, MEDICAL CARE, AND HEATING?: HARD

## 2020-01-23 SDOH — ECONOMIC STABILITY: TRANSPORTATION INSECURITY
IN THE PAST 12 MONTHS, HAS LACK OF TRANSPORTATION KEPT YOU FROM MEETINGS, WORK, OR FROM GETTING THINGS NEEDED FOR DAILY LIVING?: NO

## 2020-01-29 ENCOUNTER — PATIENT OUTREACH (OUTPATIENT)
Dept: HEALTH INFORMATION MANAGEMENT | Facility: OTHER | Age: 30
End: 2020-01-29

## 2020-05-26 ENCOUNTER — OFFICE VISIT (OUTPATIENT)
Dept: NEUROLOGY | Facility: MEDICAL CENTER | Age: 30
End: 2020-05-26
Payer: MEDICARE

## 2020-05-26 VITALS
HEART RATE: 82 BPM | BODY MASS INDEX: 16.51 KG/M2 | SYSTOLIC BLOOD PRESSURE: 90 MMHG | OXYGEN SATURATION: 100 % | DIASTOLIC BLOOD PRESSURE: 62 MMHG | HEIGHT: 62 IN | WEIGHT: 89.73 LBS | TEMPERATURE: 97.4 F

## 2020-05-26 DIAGNOSIS — R45.86 MOOD CHANGES: ICD-10-CM

## 2020-05-26 DIAGNOSIS — G35 MULTIPLE SCLEROSIS (HCC): ICD-10-CM

## 2020-05-26 PROCEDURE — 99214 OFFICE O/P EST MOD 30 MIN: CPT | Performed by: PSYCHIATRY & NEUROLOGY

## 2020-05-26 RX ORDER — IBUPROFEN 200 MG
200 TABLET ORAL EVERY 6 HOURS PRN
COMMUNITY
End: 2021-01-30

## 2020-05-26 RX ORDER — LAMOTRIGINE 25-50-100
KIT ORAL
Qty: 1 KIT | Refills: 0 | Status: SHIPPED | OUTPATIENT
Start: 2020-05-26 | End: 2020-06-30

## 2020-05-26 RX ORDER — LAMOTRIGINE 100 MG/1
100 TABLET ORAL DAILY
Qty: 30 TAB | Refills: 11 | Status: SHIPPED | OUTPATIENT
Start: 2020-05-26 | End: 2021-10-05

## 2020-05-26 RX ORDER — BACLOFEN 10 MG/1
10 TABLET ORAL 3 TIMES DAILY
Qty: 90 TAB | Refills: 11 | Status: SHIPPED | OUTPATIENT
Start: 2020-05-26 | End: 2021-10-05

## 2020-05-26 ASSESSMENT — PATIENT HEALTH QUESTIONNAIRE - PHQ9
5. POOR APPETITE OR OVEREATING: 2 - MORE THAN HALF THE DAYS
SUM OF ALL RESPONSES TO PHQ QUESTIONS 1-9: 11
CLINICAL INTERPRETATION OF PHQ2 SCORE: 2

## 2020-05-26 NOTE — ASSESSMENT & PLAN NOTE
MS 8 years ago during pregnancy numbness balance was off.  Then six months after delivery of daughter completely paralyzed from waist down.     Aubagio for a 1.5 years stopped the past two months has been getting stomach aches had a relapse and had blurred vision and increased numbness to right arm.     Usually ends up in hosptital once a year.

## 2020-05-28 PROBLEM — R45.86 MOOD CHANGES: Status: ACTIVE | Noted: 2020-05-28

## 2020-05-28 NOTE — PROGRESS NOTES
"Chief Complaint   Patient presents with   • Follow-Up     [facility] MS       Problem List Items Addressed This Visit     Multiple sclerosis (HCC)     MS 8 years ago during pregnancy her symptoms were numbness to lower extremities and her balance was off.  Then six months after delivery of daughter she became completely paralyzed from waist down.     Aubagio for a 1.5 years stopped the past two months has been getting stomach aches had a relapse and had blurred vision and increased numbness to right arm.     Usually ends up in hosptital once a year.         Relevant Orders    MR-BRAIN-WITH & W/O    MR-CERVICAL SPINE-WITH & W/O    CBC WITH DIFFERENTIAL    Comp Metabolic Panel    HEPATITIS PANEL ACUTE(4 COMPONENTS)    Quantiferon Gold TB (PPD)    HCG QUAL SERUM    VITAMIN B12    VITAMIN D,25 HYDROXY          History of present illness:  Nanci Mccormack 30 y.o. female presents today for follow-up in regards to her MS.  She is worried about her mood and thinks she may have a chemical imbalance.  She was previously on Aubajio which makes her sick.    Lumbar puncture: Yes    Past DMA’s: Aubagio and copazone        Current DMA regimen:  To be discussed    Prior neurologists:  Unable to name all of them    Prior brain imaging: Yes   10/05/2017  IMPRESSION:        1.  Multiple periventricular white matter lesions more prominent posteriorly highly suspicious for demyelinating process such as multiple sclerosis. No MRI scan of the brain is available for direct comparison.     2.  No \"active\" enhancing lesions in the brain parenchyma.    Currently employed: No     Past medical history:   Past Medical History:   Diagnosis Date   • MS (multiple sclerosis) (HCC)        Past surgical history:   Past Surgical History:   Procedure Laterality Date   • OR DENTAL SURGERY PROCEDURE  1/1/2020    Procedure: EXTRACTION, TOOTH;  Surgeon: Lefty Ansari D.D.S.;  Location: SURGERY [facility];  Service: Dental   • SUBMANDIBLE ABSCESS " INCISION AND DRAINAGE  1/1/2020    Procedure: INCISION AND DRAINAGE, ABSCESS, SUBMANDIBULAR REGION;  Surgeon: Lefty Ansari D.D.S.;  Location: SURGERY [facility];  Service: Dental   • CYST EXCISION Right     Ear       Family history:   Family History   Problem Relation Age of Onset   • Hypertension Mother    • Cancer Mother         cervical   • No Known Problems Sister    • No Known Problems Brother    • Cancer Maternal Aunt         Leiomyosarcoma   • No Known Problems Brother    • No Known Problems Sister    • No Known Problems Daughter    • No Known Problems Daughter        Social history:   Social History     Socioeconomic History   • Marital status: Single     Spouse name: Not on file   • Number of children: Not on file   • Years of education: Not on file   • Highest education level: Not on file   Occupational History   • Not on file   Social Needs   • Financial resource strain: Hard   • Food insecurity     Worry: Never true     Inability: Never true   • Transportation needs     Medical: No     Non-medical: No   Tobacco Use   • Smoking status: Never Smoker   • Smokeless tobacco: Never Used   Substance and Sexual Activity   • Alcohol use: Yes     Comment: Socially   • Drug use: Yes     Types: Inhaled, Marijuana     Comment: daily marijuana   • Sexual activity: Not on file   Lifestyle   • Physical activity     Days per week: Not on file     Minutes per session: Not on file   • Stress: Not on file   Relationships   • Social connections     Talks on phone: Not on file     Gets together: Not on file     Attends Anabaptist service: Not on file     Active member of club or organization: Not on file     Attends meetings of clubs or organizations: Not on file     Relationship status: Not on file   • Intimate partner violence     Fear of current or ex partner: Not on file     Emotionally abused: Not on file     Physically abused: Not on file     Forced sexual activity: Not on file   Other Topics Concern   • Not on  "file   Social History Narrative   • Not on file       Current medications:   Current Outpatient Medications   Medication   • ibuprofen (ADVIL) 200 MG Tab   • lamoTRIgine 25 & 50 & 100 MG Kit   • lamoTRIgine (LAMICTAL) 100 MG Tab   • baclofen (LIORESAL) 10 MG Tab   • Teriflunomide (AUBAGIO) 7 MG Tab   • Saccharomyces boulardii (PROBIOTIC) 250 MG Cap   • ibuprofen (MOTRIN) 200 MG Tab     No current facility-administered medications for this visit.        Medication Allergy:  Allergies   Allergen Reactions   • Pcn [Penicillins]      Hives         Review of systems:   ROS:   Constitutional: No fevers or chills.  Eyes: No blurry vision or eye pain.  ENT: No dysphagia or hearing loss.  Respiratory: No cough or shortness of breath.  Cardiovascular: No chest pain or palpitations.  GI: No nausea, vomiting, or diarrhea.  : No urinary incontinence or dysuria.  Musculoskeletal: No joint swelling or arthralgias.  Skin: No skin rashes.  Neuro: No headaches, dizziness, or tremors.  Endocrine: No heat or cold intolerance. No polydipsia or polyuria.  Psych:  States feeling more angry, crying bouts  Heme/Lymph: No easy bruising or swollen lymph nodes.  All other review of systems were reviewed and were negative    Physical examination:   Vitals:    05/26/20 1343   BP: (!) 90/62   BP Location: Left arm   Patient Position: Sitting   BP Cuff Size: Adult   Pulse: 82   Temp: 36.3 °C (97.4 °F)   TempSrc: Temporal   SpO2: 100%   Weight: 40.7 kg (89 lb 11.6 oz)   Height: 1.568 m (5' 1.75\")     General: Patient in no acute distress, pleasant and cooperative.  HEENT: Normocephalic, no signs of acute trauma.   Neck: supple, no meningeal signs or carotid bruits. There is normal range of motion. No tenderness on exam.   Chest: clear to auscultation. No cough.   CV: RRR, no murmurs.   Abdomen: soft, non distended or tender.   Skin: no signs of acute rashes or trauma.   Musculoskeletal: joints exhibit full range of motion, without any pain to " palpation. There are no signs of joint or muscle swelling. There is no tenderness to deep palpation of muscles.   Psychiatric: No hallucinatory behavior. Denies symptoms of depression or suicidal ideation. Describes more labile lately and doesn't understand why     NEUROLOGY EXAM  Eyes: Sclera anicteric.  Neurologic:              Mental Status: Awake, alert, oriented x 3.              Speech: Fluent with normal prosody.              Memory: Able to recall recent and remote events accurately.               Concentration: Attentive. Able to focus on history and follow multi-step commands.              Fund of Knowledge: Appropriate.              Cranial Nerves:                          CN II: PERRL. No afferent pupillary defect.                          CN III, IV, VI: Good eye closure, EOMI.                           CN V: Facial sensation intact and symmetric.                           CN VII: No facial asymmetry.                           CN VIII: Hearing intact.                           CN IX and X: Palate elevates symmetrically. Normal gag reflex.                          CN XI: Symmetric shoulder shrug.                           CN XII: Tongue midline.               Sensory: light touch equal on both right and left side of body              Coordination: No evidence of past-pointing on finger to nose testing, no dysdiadochokinesia. Heel to shin intact.               DTR's: 2+ throughout without clonus.               Babinski: Toes down going bilaterally.              Romberg: Negative.              Movements: No tremors observed.   Musculoskeletal:               Strength: 5/5 in upper and lower extremities bilaterally.              Gait: Steady, narrow based.               Tone: Normal bulk and tone.              Joints: No swelling.    ANCILLARY DATA REVIEWED:     Lab Data Review: None since her hospitalization 01/01/2020      Imaging: 10/05/17    ASSESSMENT AND PLAN:    1. Multiple sclerosis (HCC)  This  patient currently on low-dose teriflunomide but not compliant with the medication who may need a stronger medication.  Plan to image her brain and spine to determine which medication may be best for her,  - MR-BRAIN-WITH & W/O; Future  - MR-CERVICAL SPINE-WITH & W/O; Future  - CBC WITH DIFFERENTIAL; Future  - Comp Metabolic Panel; Future  - HEPATITIS PANEL ACUTE(4 COMPONENTS); Future  - Quantiferon Gold TB (PPD); Future  - HCG QUAL SERUM; Future  - VITAMIN B12; Future  - VITAMIN D,25 HYDROXY; Future    2.  Mood:  Will prescribe lamotrigine and f/u on 06/30/2020.    We discussed mood lability and depression and patient was advised the risk and benefits of lamotrigine.    FOLLOW-UP:   Follow-up after MRI and blood work completed to discuss disease modifying agents.  Assess mood and refer to psychiatry if no improvement    EDUCATION AND COUNSELING:  The patient/family educated on diagnosis and prognosis. They understand MS is a chronic progressive disorder for which there is currently no cure. Despite best efforts in management, the condition is likely to progress and carries significant risk for disability including physical and cognitive.       The patient will require frequent follow up including:  Laboratory monitoring -Imaging of entire neuro-axis (brain and spinal cord) with MRI’s w/wo contrast, every year and prn   Patient/family counseled on medication compliance.     The patient understands and agrees that due to the complexity of his/her diagnosis, results of any testing and further recommendations will typically be discussed/made during a face to face encounter in my office. The patient and/or family further understands it is their responsibility to keep proper follow up.     I Bailey TAYLOR am scribing for and in the presence of Dr. Bloch.      Bailey TAYLOR  Neurology      I, Dr. Bloch personally performed the services described in this documentation, as scribed by Bailey Rinaldi in my  presence, and it is both accurate and complete. I spent 45 minutes with this patient face-to-face, over fifty percent was spent counseling patient on their condition, best management practices, reviewing test results and risks and benefits of treatment.

## 2020-06-09 ENCOUNTER — TELEPHONE (OUTPATIENT)
Dept: NEUROLOGY | Facility: MEDICAL CENTER | Age: 30
End: 2020-06-09

## 2020-06-09 NOTE — TELEPHONE ENCOUNTER
[phone]  Pt called states she started taking the Lamotrigine about 2 weeks ago and started getting very itchy like 3 days ago, she states she took benadryl and she was still itchy. No help. Please advise.

## 2020-06-10 ENCOUNTER — OFFICE VISIT (OUTPATIENT)
Dept: NEUROLOGY | Facility: MEDICAL CENTER | Age: 30
End: 2020-06-10
Payer: MEDICARE

## 2020-06-10 VITALS
BODY MASS INDEX: 16.31 KG/M2 | WEIGHT: 88.63 LBS | DIASTOLIC BLOOD PRESSURE: 60 MMHG | OXYGEN SATURATION: 98 % | HEIGHT: 62 IN | HEART RATE: 68 BPM | SYSTOLIC BLOOD PRESSURE: 90 MMHG | TEMPERATURE: 98.6 F | RESPIRATION RATE: 16 BRPM

## 2020-06-10 DIAGNOSIS — L29.9 PRURITUS: ICD-10-CM

## 2020-06-10 DIAGNOSIS — R45.86 MOOD CHANGES: Primary | ICD-10-CM

## 2020-06-10 PROCEDURE — 99214 OFFICE O/P EST MOD 30 MIN: CPT | Performed by: REGISTERED NURSE

## 2020-06-10 RX ORDER — LAMOTRIGINE 25 MG/1
25 TABLET ORAL DAILY
Qty: 30 TAB | Refills: 0 | Status: SHIPPED | OUTPATIENT
Start: 2020-06-10 | End: 2021-10-05

## 2020-06-10 NOTE — PROGRESS NOTES
"Subjective:      Nanci Mccormack is a 30 y.o. female who presents with new symptoms of pruritis to bilateral palms secondary to Lamotrigine which           HPI  Nanci Mccormack was seen on 05/26/2020 for follow up regarding MS.  She presents today as she is having problems with itchy palms.  She was prescribed lamotrigine on 05/26/ but was not given the starter pack as written.  She never titrated up  She was given 100mg tablets.  She states she cracked the pills in half but they crumbled if she tried to cut any further.      ROS   ROS:   Constitutional: No fevers or chills.  Eyes: No blurry vision or eye pain.  ENT: No dysphagia or hearing loss.  Respiratory: No cough or shortness of breath.  Cardiovascular: No chest pain or palpitations.  GI: No nausea, vomiting, or diarrhea.  : No urinary incontinence or dysuria.  Musculoskeletal: No joint swelling or arthralgias.  Skin: No skin rashes.  Neuro: No headaches, dizziness, or tremors.  Endocrine: No heat or cold intolerance. No polydipsia or polyuria.  Psych:  Feeling much better and that she is coping well.    Heme/Lymph: No easy bruising or swollen lymph nodes.  All other review of systems were reviewed and were negative         Objective:     BP (!) 90/60 (BP Location: Left arm, Patient Position: Sitting, BP Cuff Size: Adult)   Pulse 68   Temp 37 °C (98.6 °F) (Temporal)   Resp 16   Ht 1.568 m (5' 1.73\")   Wt 40.2 kg (88 lb 10 oz)   SpO2 98%   BMI 16.35 kg/m²      Physical Exam   Constitutional: She is oriented to person, place, and time.   Eyes: Pupils are equal, round, and reactive to light.   Neck: Neck supple.   Cardiovascular: Normal rate.   Pulmonary/Chest: Effort normal and breath sounds normal.   Abdominal: Bowel sounds are normal.   Musculoskeletal: Normal range of motion.   Neurological: She is alert and oriented to person, place, and time. She has normal reflexes.   Skin: Skin is warm and dry.   Psychiatric: She has a normal mood and " affect.                   Assessment/Plan:           1. Mood changes    - lamoTRIgine (LAMICTAL) 25 MG Tab; Take 1 Tab by mouth every day.  Dispense: 30 Tab; Refill: 0  Explained to patient to take 25 mg x 10 days, then go up to 50 x10 days then go up to 100.      2.  Pruritis    Severe itching to bilateral palms.  Probably secondary to starting to high a dose of lamotrigine.  Will start at 25mg today then titrate up at 10 day intervals to 100.  Verbalizes understanding of how to do this.    F/U June 30th as previously scheduled.  Will have MRI in the interim as previously discussed.    Patient was seen for 45 minutes face to face of which > 50% of appointment time was spent on counseling and coordination of care regarding the above.      EVELYN Lorenzana.

## 2020-06-10 NOTE — ASSESSMENT & PLAN NOTE
Patient states that she was not given a starter pack and was only given the 100mg tablets for lamotrigine.  She is suffering with itchy palms.

## 2020-06-17 ENCOUNTER — HOSPITAL ENCOUNTER (OUTPATIENT)
Dept: RADIOLOGY | Facility: MEDICAL CENTER | Age: 30
End: 2020-06-17
Attending: PSYCHIATRY & NEUROLOGY
Payer: MEDICARE

## 2020-06-17 VITALS — HEIGHT: 62 IN | WEIGHT: 89 LBS | BODY MASS INDEX: 16.38 KG/M2

## 2020-06-17 DIAGNOSIS — G35 MULTIPLE SCLEROSIS (HCC): ICD-10-CM

## 2020-06-17 PROCEDURE — 700117 HCHG RX CONTRAST REV CODE 255: Performed by: PSYCHIATRY & NEUROLOGY

## 2020-06-17 PROCEDURE — A9270 NON-COVERED ITEM OR SERVICE: HCPCS | Performed by: RADIOLOGY

## 2020-06-17 PROCEDURE — A9576 INJ PROHANCE MULTIPACK: HCPCS | Performed by: PSYCHIATRY & NEUROLOGY

## 2020-06-17 PROCEDURE — 70553 MRI BRAIN STEM W/O & W/DYE: CPT

## 2020-06-17 PROCEDURE — 72156 MRI NECK SPINE W/O & W/DYE: CPT

## 2020-06-17 PROCEDURE — 700102 HCHG RX REV CODE 250 W/ 637 OVERRIDE(OP): Performed by: RADIOLOGY

## 2020-06-17 RX ORDER — DIAZEPAM 5 MG/1
5 TABLET ORAL
Status: COMPLETED | OUTPATIENT
Start: 2020-06-17 | End: 2020-06-17

## 2020-06-17 RX ADMIN — DIAZEPAM 5 MG: 5 TABLET ORAL at 16:10

## 2020-06-17 RX ADMIN — GADOTERIDOL 8 ML: 279.3 INJECTION, SOLUTION INTRAVENOUS at 18:12

## 2020-06-17 RX ADMIN — DIAZEPAM 5 MG: 5 TABLET ORAL at 17:12

## 2020-06-17 NOTE — DISCHARGE INSTRUCTIONS
MRI ADULT DISCHARGE INSTRUCTIONS    You have been medicated today for your scan. Please follow the instructions below to ensure your safe recovery. If you have any questions or problems, feel free to call us at [phone] or [phone].     1.   Have someone stay with you to assist you as needed.    2.   Do not drive or operate any mechanical devices.    3.   Do not perform any activity that requires concentration. Make no major decisions over the next 24 hours.     4.   Be careful changing positions from laying down to sitting or standing, as you may become dizzy.     5.   Do not drink alcohol for 48 hours.    6.   There are no restrictions for eating your normal meals. Drink fluids.    7.   You may continue your usual medications for pain, tranquilizers, muscle relaxants or sedatives when awake.     8.   Tomorrow, you may continue your normal daily activities.     9.   Pressure dressing on 10 - 15 minutes. If swelling or bleeding occurs when removed, continue placing direct pressure on injection site for another 5 minutes, or until bleeding stops.     I have been informed of and understand the above discharge instructions. Diazepam (VALIUM) Oral solution  What is this medicine?  You were prescribed DIAZEPAM (dye AZ e shirley) for the procedure you had today. This medication is a benzodiazepine. It is used to treat anxiety and nervousness. It also can help treat alcohol withdrawal, relax muscles, and treat certain types of seizures.  This medicine may be used for other purposes; ask your health care provider or pharmacist if you have questions.  What side effects may I notice from receiving this medicine?  Side effects that you should report to your doctor or health care professional as soon as possible:  • allergic reactions like skin rash, itching or hives, swelling of the face, lips, or tongue  • angry, confused, depressed, other mood changes  • breathing problems  • feeling faint or lightheaded, falls  • muscle  cramps  • problems with balance, talking, walking  • restlessness  • tremors  • trouble passing urine or change in the amount of urine  • unusually weak or tired  Side effects that usually do not require medical attention (report to your doctor or health care professional if they continue or are bothersome):  • difficulty sleeping, nightmares  • dizziness, drowsiness, clumsiness, or unsteadiness, a hangover effect  • headache  • nausea, vomiting  This list may not describe all possible side effects. Call your doctor for medical advice about side effects. You may report side effects to FDA at 6-576-ITG-1213.

## 2020-06-30 ENCOUNTER — OFFICE VISIT (OUTPATIENT)
Dept: NEUROLOGY | Facility: MEDICAL CENTER | Age: 30
End: 2020-06-30
Payer: MEDICARE

## 2020-06-30 VITALS
HEIGHT: 62 IN | HEART RATE: 106 BPM | DIASTOLIC BLOOD PRESSURE: 66 MMHG | WEIGHT: 88.18 LBS | OXYGEN SATURATION: 97 % | SYSTOLIC BLOOD PRESSURE: 106 MMHG | BODY MASS INDEX: 16.23 KG/M2 | TEMPERATURE: 98.4 F

## 2020-06-30 DIAGNOSIS — F43.10 PTSD (POST-TRAUMATIC STRESS DISORDER): ICD-10-CM

## 2020-06-30 DIAGNOSIS — F31.62 BIPOLAR DISORDER, CURRENT EPISODE MIXED, MODERATE (HCC): ICD-10-CM

## 2020-06-30 DIAGNOSIS — R11.0 CHRONIC NAUSEA: ICD-10-CM

## 2020-06-30 DIAGNOSIS — G35 MULTIPLE SCLEROSIS (HCC): ICD-10-CM

## 2020-06-30 DIAGNOSIS — F51.5 NIGHTMARES ASSOCIATED WITH CHRONIC POST-TRAUMATIC STRESS DISORDER: ICD-10-CM

## 2020-06-30 DIAGNOSIS — F43.12 NIGHTMARES ASSOCIATED WITH CHRONIC POST-TRAUMATIC STRESS DISORDER: ICD-10-CM

## 2020-06-30 PROCEDURE — 99215 OFFICE O/P EST HI 40 MIN: CPT | Performed by: REGISTERED NURSE

## 2020-06-30 RX ORDER — PRAZOSIN HYDROCHLORIDE 1 MG/1
1 CAPSULE ORAL EVERY EVENING
Qty: 30 CAP | Refills: 3 | Status: SHIPPED | OUTPATIENT
Start: 2020-06-30 | End: 2021-10-05

## 2020-06-30 RX ORDER — ONDANSETRON 4 MG/1
4 TABLET, FILM COATED ORAL EVERY 4 HOURS PRN
Qty: 20 TAB | Refills: 3 | Status: SHIPPED | OUTPATIENT
Start: 2020-06-30 | End: 2021-10-05

## 2020-06-30 ASSESSMENT — PATIENT HEALTH QUESTIONNAIRE - PHQ9: CLINICAL INTERPRETATION OF PHQ2 SCORE: 0
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
## 2020-10-21 ENCOUNTER — HOSPITAL ENCOUNTER (EMERGENCY)
Facility: MEDICAL CENTER | Age: 30
End: 2020-10-21
Payer: MEDICARE

## 2020-10-21 VITALS
BODY MASS INDEX: 16.39 KG/M2 | WEIGHT: 89.07 LBS | OXYGEN SATURATION: 100 % | HEIGHT: 62 IN | TEMPERATURE: 98.2 F | HEART RATE: 96 BPM | DIASTOLIC BLOOD PRESSURE: 77 MMHG | SYSTOLIC BLOOD PRESSURE: 107 MMHG | RESPIRATION RATE: 16 BRPM

## 2020-10-21 PROCEDURE — 302449 STATCHG TRIAGE ONLY (STATISTIC)

## 2020-10-22 ENCOUNTER — HOSPITAL ENCOUNTER (EMERGENCY)
Facility: MEDICAL CENTER | Age: 30
End: 2020-10-22
Attending: EMERGENCY MEDICINE | Admitting: EMERGENCY MEDICINE
Payer: MEDICARE

## 2020-10-22 VITALS
BODY MASS INDEX: 17.32 KG/M2 | RESPIRATION RATE: 18 BRPM | TEMPERATURE: 97.9 F | HEIGHT: 62 IN | WEIGHT: 94.14 LBS | OXYGEN SATURATION: 98 % | DIASTOLIC BLOOD PRESSURE: 86 MMHG | SYSTOLIC BLOOD PRESSURE: 112 MMHG | HEART RATE: 78 BPM

## 2020-10-22 DIAGNOSIS — N76.0 ACUTE VAGINITIS: ICD-10-CM

## 2020-10-22 LAB
APPEARANCE UR: ABNORMAL
BACTERIA #/AREA URNS HPF: ABNORMAL /HPF
BILIRUB UR QL STRIP.AUTO: NEGATIVE
COLOR UR: YELLOW
EPI CELLS #/AREA URNS HPF: ABNORMAL /HPF
GLUCOSE UR STRIP.AUTO-MCNC: NEGATIVE MG/DL
HCG UR QL: NEGATIVE
HYALINE CASTS #/AREA URNS LPF: ABNORMAL /LPF
KETONES UR STRIP.AUTO-MCNC: NEGATIVE MG/DL
LEUKOCYTE ESTERASE UR QL STRIP.AUTO: NEGATIVE
MICRO URNS: ABNORMAL
MUCOUS THREADS #/AREA URNS HPF: ABNORMAL /HPF
NITRITE UR QL STRIP.AUTO: NEGATIVE
PH UR STRIP.AUTO: 6.5 [PH] (ref 5–8)
PROT UR QL STRIP: NEGATIVE MG/DL
RBC # URNS HPF: ABNORMAL /HPF
RBC UR QL AUTO: NEGATIVE
SP GR UR STRIP.AUTO: 1.02
UROBILINOGEN UR STRIP.AUTO-MCNC: 0.2 MG/DL
WBC #/AREA URNS HPF: ABNORMAL /HPF

## 2020-10-22 PROCEDURE — 81025 URINE PREGNANCY TEST: CPT

## 2020-10-22 PROCEDURE — 81001 URINALYSIS AUTO W/SCOPE: CPT

## 2020-10-22 PROCEDURE — 99284 EMERGENCY DEPT VISIT MOD MDM: CPT

## 2020-10-22 RX ORDER — FLUCONAZOLE 150 MG/1
150 TABLET ORAL DAILY
Qty: 1 TAB | Refills: 1 | Status: SHIPPED | OUTPATIENT
Start: 2020-10-22 | End: 2021-10-05

## 2020-10-22 NOTE — ED TRIAGE NOTES
"Ambulatory to triage with   Chief Complaint   Patient presents with   • Urinary Frequency   Above complaint x 1 week after having sex in a hot tub and not urinating until the next morning. Denies flank pain. States it feels \"irritating to pee.\" Denies fever or chills.      "

## 2020-10-22 NOTE — ED TRIAGE NOTES
Nanci Mccormack  Chief Complaint   Patient presents with   • Painful Urination     x 4 days     Pt ambulatory to triage with above complaint.  VSS, no acute distress.   Urine obtained and sent per protocol.  Pt/staff masked and in appropriate PPE during encounter.     Pt returned to lobby, educated on triage process, and to inform staff of any changes or concerns.
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
## 2020-10-22 NOTE — ED NOTES
Discharge instructions provided with Rx x 1 sent to pharmacy.  Verbalized understanding of follow-up care, medication management & reasons to return to ED.  Released in stable condition.

## 2020-11-18 ENCOUNTER — TELEPHONE (OUTPATIENT)
Dept: NEUROLOGY | Facility: MEDICAL CENTER | Age: 30
End: 2020-11-18

## 2020-11-18 NOTE — TELEPHONE ENCOUNTER
[phone]  [facility] pharmacy called requesting call back to ok shipment for Mavenclad she is 1 month out of window on 2nd cycle if ok to ship.    Called [facility] pharmacy spoke with Renee who transferred me to [facility] to [facility] then she transferred me to another person/location, spoke with Van who transferred me back to [facility]   Cleared Mavenclad ok per dr Bloch. Shipped last cycle.

## 2020-12-07 ENCOUNTER — TELEPHONE (OUTPATIENT)
Dept: NEUROLOGY | Facility: MEDICAL CENTER | Age: 30
End: 2020-12-07

## 2020-12-07 NOTE — TELEPHONE ENCOUNTER
[facility] called stating unable to contact pt to ship Mavenclad.   Called pt let her know pharmacy has been trying to get a hold of her, she states she will call to confirm. And also transferred to [facility] for pt to schedule follow up with dr Bloch

## 2021-01-04 ENCOUNTER — TELEPHONE (OUTPATIENT)
Dept: NEUROLOGY | Facility: MEDICAL CENTER | Age: 31
End: 2021-01-04

## 2021-01-04 NOTE — TELEPHONE ENCOUNTER
[facility] [phone]  Called stating they have been trying to get a hold of pt and unable to reach, pt only did 1st month. Never started second month Mavenclad. They need pt to contact them.    Called pt unable to [facility], number not accepting calls

## 2021-01-30 ENCOUNTER — APPOINTMENT (OUTPATIENT)
Dept: RADIOLOGY | Facility: MEDICAL CENTER | Age: 31
End: 2021-01-30
Attending: EMERGENCY MEDICINE
Payer: MEDICARE

## 2021-01-30 ENCOUNTER — HOSPITAL ENCOUNTER (EMERGENCY)
Facility: MEDICAL CENTER | Age: 31
End: 2021-01-30
Attending: EMERGENCY MEDICINE | Admitting: EMERGENCY MEDICINE
Payer: MEDICARE

## 2021-01-30 VITALS
DIASTOLIC BLOOD PRESSURE: 73 MMHG | HEART RATE: 96 BPM | SYSTOLIC BLOOD PRESSURE: 106 MMHG | OXYGEN SATURATION: 100 % | BODY MASS INDEX: 16.65 KG/M2 | WEIGHT: 91.05 LBS | RESPIRATION RATE: 18 BRPM | TEMPERATURE: 97 F

## 2021-01-30 DIAGNOSIS — S05.02XA ABRASION OF LEFT CORNEA, INITIAL ENCOUNTER: ICD-10-CM

## 2021-01-30 PROCEDURE — 99283 EMERGENCY DEPT VISIT LOW MDM: CPT

## 2021-01-30 PROCEDURE — 700101 HCHG RX REV CODE 250: Performed by: EMERGENCY MEDICINE

## 2021-01-30 RX ORDER — POLYMYXIN B SULFATE AND TRIMETHOPRIM 1; 10000 MG/ML; [USP'U]/ML
1 SOLUTION OPHTHALMIC EVERY 4 HOURS
Qty: 10 ML | Refills: 0 | Status: SHIPPED | OUTPATIENT
Start: 2021-01-30 | End: 2021-02-02

## 2021-01-30 RX ORDER — PROPARACAINE HYDROCHLORIDE 5 MG/ML
1 SOLUTION/ DROPS OPHTHALMIC ONCE
Status: COMPLETED | OUTPATIENT
Start: 2021-01-30 | End: 2021-01-30

## 2021-01-30 RX ADMIN — PROPARACAINE HYDROCHLORIDE 1 DROP: 5 SOLUTION/ DROPS OPHTHALMIC at 13:45

## 2021-01-30 RX ADMIN — FLUORESCEIN SODIUM 1 MG: 1 STRIP OPHTHALMIC at 13:45

## 2021-01-30 ASSESSMENT — ENCOUNTER SYMPTOMS
EYE REDNESS: 1
HEADACHES: 0
PHOTOPHOBIA: 1
EYE PAIN: 1
FEVER: 0
BLURRED VISION: 1

## 2021-01-30 NOTE — ED NOTES
Per assigned RN, Break RN to discharge Pt home. Pt given discharge instructions and prescription. Pt verbalized understanding, all questions answered, vss upon d/c. Pt steady gait during ambulation.

## 2021-01-30 NOTE — ED NOTES
ERP eval complete. Pt unable to tolerate visual acuity at this time. ERP aware. Proparacaine applied to left eye.
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
## 2021-01-30 NOTE — ED NOTES
Visual acuity:   OU 20/100  OS 20/200  OD 20/70    Pt normally wears glasses but does not have them w/ her. wears glasses.
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
## 2021-02-04 ENCOUNTER — HOSPITAL ENCOUNTER (EMERGENCY)
Facility: MEDICAL CENTER | Age: 31
End: 2021-02-04
Attending: EMERGENCY MEDICINE | Admitting: EMERGENCY MEDICINE
Payer: MEDICARE

## 2021-02-04 VITALS
HEART RATE: 75 BPM | BODY MASS INDEX: 16.92 KG/M2 | WEIGHT: 91.93 LBS | OXYGEN SATURATION: 100 % | RESPIRATION RATE: 16 BRPM | HEIGHT: 62 IN | DIASTOLIC BLOOD PRESSURE: 77 MMHG | TEMPERATURE: 97.5 F | SYSTOLIC BLOOD PRESSURE: 109 MMHG

## 2021-02-04 DIAGNOSIS — R04.0 EPISTAXIS: ICD-10-CM

## 2021-02-04 PROCEDURE — 99281 EMR DPT VST MAYX REQ PHY/QHP: CPT
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
## 2021-02-04 NOTE — ED NOTES
Patient ambulated to YEL 62 without difficulty. Pt A&OX4, RR even and unlabored. No bleeding from nose at this time. Chart up for ERP.

## 2021-02-04 NOTE — ED NOTES
Patient given d/c instructions, including follow up care as needed. Pt denies questions. Pt A&OX4, RR even and unlabored. Stable upon d/c. Ambulated out of ER without difficulty.
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
## 2021-10-04 ENCOUNTER — APPOINTMENT (OUTPATIENT)
Dept: NEUROLOGY | Facility: MEDICAL CENTER | Age: 31
End: 2021-10-04
Payer: MEDICARE

## 2021-10-05 ENCOUNTER — OFFICE VISIT (OUTPATIENT)
Dept: NEUROLOGY | Facility: MEDICAL CENTER | Age: 31
End: 2021-10-05
Attending: PSYCHIATRY & NEUROLOGY
Payer: MEDICARE

## 2021-10-05 VITALS
HEIGHT: 61 IN | DIASTOLIC BLOOD PRESSURE: 66 MMHG | TEMPERATURE: 97.8 F | RESPIRATION RATE: 12 BRPM | WEIGHT: 90.83 LBS | BODY MASS INDEX: 17.15 KG/M2 | HEART RATE: 84 BPM | SYSTOLIC BLOOD PRESSURE: 100 MMHG

## 2021-10-05 DIAGNOSIS — G35 MULTIPLE SCLEROSIS (HCC): ICD-10-CM

## 2021-10-05 PROCEDURE — 99211 OFF/OP EST MAY X REQ PHY/QHP: CPT | Performed by: PSYCHIATRY & NEUROLOGY

## 2021-10-05 PROCEDURE — 99214 OFFICE O/P EST MOD 30 MIN: CPT | Performed by: PSYCHIATRY & NEUROLOGY

## 2021-10-05 RX ORDER — ONDANSETRON 4 MG/1
4 TABLET, FILM COATED ORAL EVERY 4 HOURS PRN
Qty: 20 TABLET | Refills: 3 | Status: SHIPPED | OUTPATIENT
Start: 2021-10-05 | End: 2023-01-10 | Stop reason: SDUPTHER

## 2021-10-05 RX ORDER — LAMOTRIGINE 25 MG/1
25 TABLET, EXTENDED RELEASE ORAL DAILY
Qty: 30 TABLET | Refills: 5 | Status: SHIPPED | OUTPATIENT
Start: 2021-10-05 | End: 2021-11-02

## 2021-10-05 RX ORDER — BACLOFEN 10 MG/1
10 TABLET ORAL 3 TIMES DAILY
Qty: 90 TABLET | Refills: 5 | Status: SHIPPED | OUTPATIENT
Start: 2021-10-05 | End: 2023-01-05 | Stop reason: SDUPTHER

## 2021-10-05 NOTE — PROGRESS NOTES
Chief Complaint   Patient presents with   • Follow-Up     MS       Problem List Items Addressed This Visit     Multiple sclerosis (HCC)     Pt feels like she was getting worse with her MS. Pt feels like she has had progression of disease. Pt has failed aubagio and copaxone with breakthrough disease activity. She had 2 new lesions on her last MRI scan. She has had difficulty with appointments during the pandemic and she has had problems getting a f/u appointment with me. She states she needs refills on lamictal and baclofen . She has dizziness and balance issues and she walks with a cane sometimes.  Patient wants to get on any medication but one with less side effects.         Relevant Medications    LamoTRIgine 25 MG TABLET SR 24 HR    baclofen (LIORESAL) 10 MG Tab    ondansetron (ZOFRAN) 4 MG Tab tablet    Other Relevant Orders    HEPATITIS PANEL ACUTE(4 COMPONENTS)    Quantiferon Gold TB (PPD)    CBC WITH DIFFERENTIAL    Comp Metabolic Panel    MR-BRAIN-WITH & W/O          History of present illness:  Nanci Mccormack 31 y.o. female presents today for patient is here to get reestablished with me as she has had difficulty getting in for appointment in the last year plus.  She has run out of her medications and thinks she has been off medications for at least 6 months.  Patient had a loss pregnancy in the interim and does not want to become pregnant again.    Past medical history:   Past Medical History:   Diagnosis Date   • MS (multiple sclerosis) (HCC)        Past surgical history:   Past Surgical History:   Procedure Laterality Date   • [place] DENTAL SURGERY PROCEDURE  1/1/2020    Procedure: EXTRACTION, TOOTH;  Surgeon: Lefty Ansari D.D.S.;  Location: SURGERY [facility];  Service: Dental   • SUBMANDIBLE ABSCESS INCISION AND DRAINAGE  1/1/2020    Procedure: INCISION AND DRAINAGE, ABSCESS, SUBMANDIBULAR REGION;  Surgeon: Lefty Ansari D.D.S.;  Location: SURGERY [facility];  Service: Dental   • CYST  EXCISION Right     Ear       Family history:   Family History   Problem Relation Age of Onset   • Hypertension Mother    • Cancer Mother         cervical   • No Known Problems Sister    • No Known Problems Brother    • Cancer Maternal Aunt         Leiomyosarcoma   • No Known Problems Brother    • No Known Problems Sister    • No Known Problems Daughter    • No Known Problems Daughter        Social history:   Social History     Socioeconomic History   • Marital status: Single     Spouse name: Not on file   • Number of children: Not on file   • Years of education: Not on file   • Highest education level: Not on file   Occupational History   • Not on file   Tobacco Use   • Smoking status: Never Smoker   • Smokeless tobacco: Never Used   Substance and Sexual Activity   • Alcohol use: Yes     Comment: Socially   • Drug use: Yes     Types: Inhaled, Marijuana     Comment: daily marijuana   • Sexual activity: Not on file   Other Topics Concern   • Not on file   Social History Narrative   • Not on file     Social Determinants of Health     Financial Resource Strain:    • Difficulty of Paying Living Expenses:    Food Insecurity:    • Worried About Running Out of Food in the Last Year:    • Ran Out of Food in the Last Year:    Transportation Needs:    • Lack of Transportation (Medical):    • Lack of Transportation (Non-Medical):    Physical Activity:    • Days of Exercise per Week:    • Minutes of Exercise per Session:    Stress:    • Feeling of Stress :    Social Connections:    • Frequency of Communication with Friends and Family:    • Frequency of Social Gatherings with Friends and Family:    • Attends Latter day Services:    • Active Member of Clubs or Organizations:    • Attends Club or Organization Meetings:    • Marital Status:    Intimate Partner Violence:    • Fear of Current or Ex-Partner:    • Emotionally Abused:    • Physically Abused:    • Sexually Abused:        Current medications:   Current Outpatient  Medications   Medication   • LamoTRIgine 25 MG TABLET SR 24 HR   • baclofen (LIORESAL) 10 MG Tab   • ondansetron (ZOFRAN) 4 MG Tab tablet     No current facility-administered medications for this visit.       Medication Allergy:  Allergies   Allergen Reactions   • Pcn [Penicillins]      Hives         Review of systems:   Constitutional: denies fever, night sweats, weight loss.   Eyes: denies acute vision change, eye pain or secretion.   Ears, Nose, Mouth, Throat: denies nasal secretion, nasal bleeding, difficulty swallowing, hearing loss, tinnitus, vertigo, ear pain, acute dental problems, oral ulcers or lesions.   Endocrine: denies recent weight changes, heat or cold intolerance, polyuria, polydypsia, polyphagia,abnormal hair growth.  Cardiovascular: denies new onset of chest pain, palpitations, syncope, or dyspnea of exertion.  Pulmonary: denies shortness of breath, new onset of cough, hemoptysis, wheezing, chest pain or flu-like symptoms.   GI: denies nausea, vomiting, diarrhea, GI bleeding, change in appetite, abdominal pain, and change in bowel habits.  : denies dysuria, urinary incontinence, hematuria.  Heme/oncology: denies history of easy bruising or bleeding. No history of cancer, DVTor PE.  Allergy/immunology: denies hives/urticaria, or itching.   Dermatologic: denies new rash, or new skin lesions.  Musculoskeletal:denies joint swelling or pain, muscle pain, neck and back pain. Neurologic: denies headaches, acute visual changes, facial droopiness, muscle weakness (focal or generalized), paresthesias, anesthesia, ataxia, change in speech or language, memory loss, abnormal movements, seizures, loss of consciousness, or episodes of confusion.   Psychiatric: denies symptoms of depression, anxiety, hallucinations, mood swings or changes, suicidal or homicidal thoughts.     Physical examination:   Vitals:    10/05/21 0859   BP: 100/66   BP Location: Right arm   Patient Position: Sitting   BP Cuff Size: Small  "adult   Pulse: 84   Resp: 12   Temp: 36.6 °C (97.8 °F)   TempSrc: Temporal   Weight: 41.2 kg (90 lb 13.3 oz)   Height: 1.549 m (5' 1\")   PF: 100 L/min     General: Patient in no acute distress, pleasant and cooperative.  HEENT: Normocephalic, no signs of acute trauma.   Neck: supple, no meningeal signs or carotid bruits. There is normal range of motion. No tenderness on exam.   Chest: clear to auscultation. No cough.   CV: RRR, no murmurs.   Skin: no signs of acute rashes or trauma.   Musculoskeletal: joints exhibit full range of motion, without any pain to palpation. There are no signs of joint or muscle swelling. There is no tenderness to deep palpation of muscles.   Psychiatric: No hallucinatory behavior. Denies symptoms of depression or suicidal ideation. Mood and affect appear normal on exam.     NEUROLOGICAL EXAM:   Mental status, orientation: Awake, alert and fully oriented.   Speech and language: speech is clear and fluent. The patient is able to name, repeat and comprehend.   Memory: There is intact recollection of recent and remote events.   Cranial nerve exam: Pupils are 3-4 mm bilaterally and equally reactive to light and accommodation. Visual fields are intact by confrontation. Fundoscopic exam was unremarkable. There is no nystagmus on primary or secondary gaze. Intact full EOM in all directions of gaze. Face appears symmetric. Sensation in the face is intact to light touch. Uvula is midline. Palate elevates symmetrically. Tongue is midline and without any signs of tongue biting or fasciculations. Sternocleidomastoid muscles exhibit is normal strength bilaterally. Shoulder shrug is intact bilaterally.   Motor exam: Strength is 5/5 in all extremities on her left and 4+ out of 5 on her right. Tone is normal. No abnormal movements were seen on exam.   Sensory exam reveals normal sense of light touch, proprioception, vibration and pinprick in all extremities.   Deep tendon reflexes:  2+ throughout. Plantar " responses are flexor. There is no clonus.   Coordination: shows a normal finger-nose-finger. Normal rapidly alternating movements.   Gait: The patient was able to get up from seated position on first attempt without requiring assistance. Found to be steady when walking. Movements were fluid with normal arm swing. The patient was able to turn without difficulties or tendency to fall. Romberg examination positive      ANCILLARY DATA REVIEWED:     Lab Data Review:  No results found for this or any previous visit (from the past 24 hour(s)).    Records reviewed: I reviewed my previous records in addition to previous MRI scans.      Imaging: We reviewed MRI scans from last year which did show 2 new lesions in June 2020.          ASSESSMENT AND PLAN:    1. Multiple sclerosis (HCC)  After discussion of medications it was decided that patient may tolerate Mavenclad for good efficacy and tolerability.  Patient has already failed Aubagio with breakthrough disease with the 2 new lesions.  Prior to Aubagio patient broke through Copaxone with continued lesions.  Patient has a red relatively aggressive form of the disease and at her young age she needs high efficacy treatment.  Patient states she is not planning to have any more children she is completely done with her childbearing.  Risk and benefits of this treatment were discussed.  I refilled her lamotrigine for treatment of her MS symptoms including mood benefit.  I have refilled baclofen for muscle spasms and ondansetron for her nausea that comes with her MS.  - LamoTRIgine 25 MG TABLET SR 24 HR; Take 25 mg by mouth every day.  Dispense: 30 Tablet; Refill: 5  - HEPATITIS PANEL ACUTE(4 COMPONENTS); Future  - Quantiferon Gold TB (PPD); Future  - CBC WITH DIFFERENTIAL; Future  - Comp Metabolic Panel; Future  - MR-BRAIN-WITH & W/O; Future  - baclofen (LIORESAL) 10 MG Tab; Take 1 Tablet by mouth 3 times a day.  Dispense: 90 Tablet; Refill: 5  - ondansetron (ZOFRAN) 4 MG Tab  tablet; Take 1 Tablet by mouth every four hours as needed for Nausea/Vomiting.  Dispense: 20 Tablet; Refill: 3        FOLLOW-UP:   1 month    My total time spent caring for the patient on the day of the encounter was 36 minutes.   This does not include time spent on separately billable procedures/tests.          EDUCATION AND COUNSELING:  -Discussed regular exercise program and prevention of cardiovascular disease, including stroke.   -Discussed healthy lifestyle, including: healthy diet (rich in fruits, vegetables, nuts and healthy oils); proper hydration, and adequate sleep hygiene (allowing 7-8 hrs of overnight sleep).        Melissa Bloch, MD  Clinical  of Neurology [facility].   Diplomate in Neurology.   Office: [phone]  Fax: [phone]

## 2021-10-05 NOTE — ASSESSMENT & PLAN NOTE
Pt feels like she was getting worse with her MS. Pt feels like she has had progression of disease. Pt has failed aubagio and copaxone with breakthrough disease activity. She had 2 new lesions on her last MRI scan. She has had difficulty with appointments during the pandemic and she has had problems getting a f/u appointment with me. She states she needs refills on lamictal and baclofen . She has dizziness and balance issues and she walks with a cane sometimes.  Patient wants to get on any medication but one with less side effects.

## 2021-11-02 ENCOUNTER — OFFICE VISIT (OUTPATIENT)
Dept: NEUROLOGY | Facility: MEDICAL CENTER | Age: 31
End: 2021-11-02
Attending: PSYCHIATRY & NEUROLOGY
Payer: MEDICARE

## 2021-11-02 VITALS
WEIGHT: 90.39 LBS | TEMPERATURE: 98.3 F | SYSTOLIC BLOOD PRESSURE: 100 MMHG | HEART RATE: 61 BPM | HEIGHT: 61 IN | OXYGEN SATURATION: 96 % | BODY MASS INDEX: 17.07 KG/M2 | DIASTOLIC BLOOD PRESSURE: 68 MMHG

## 2021-11-02 DIAGNOSIS — F32.1 MODERATE SINGLE CURRENT EPISODE OF MAJOR DEPRESSIVE DISORDER (HCC): ICD-10-CM

## 2021-11-02 DIAGNOSIS — G35 MULTIPLE SCLEROSIS (HCC): ICD-10-CM

## 2021-11-02 DIAGNOSIS — M25.522 ARTHRALGIA OF BOTH ELBOWS: ICD-10-CM

## 2021-11-02 DIAGNOSIS — M25.521 ARTHRALGIA OF BOTH ELBOWS: ICD-10-CM

## 2021-11-02 PROCEDURE — 99211 OFF/OP EST MAY X REQ PHY/QHP: CPT | Performed by: PSYCHIATRY & NEUROLOGY

## 2021-11-02 PROCEDURE — 99215 OFFICE O/P EST HI 40 MIN: CPT | Performed by: PSYCHIATRY & NEUROLOGY

## 2021-11-02 RX ORDER — LAMOTRIGINE 50 MG/1
50 TABLET, EXTENDED RELEASE ORAL
Qty: 30 TABLET | Refills: 3 | Status: SHIPPED | OUTPATIENT
Start: 2021-11-02 | End: 2023-01-05 | Stop reason: SDUPTHER

## 2021-11-02 NOTE — ASSESSMENT & PLAN NOTE
Pt has weakness and numbness on her left side.  Patient feels like her multiple sclerosis is active and she has not done her MRI or her labs yet.  The baclofen has helped a little but not substantially.  It does make her tired.

## 2021-11-03 NOTE — ASSESSMENT & PLAN NOTE
Patient thinks that the lamotrigine is helping somewhat with her mood but she could use more.  She has not had a rash.

## 2021-11-03 NOTE — PROGRESS NOTES
Chief Complaint   Patient presents with   • Follow-Up     MS       Problem List Items Addressed This Visit     Multiple sclerosis (HCC)     Pt has weakness and numbness on her left side.  Patient feels like her multiple sclerosis is active and she has not done her MRI or her labs yet.  The baclofen has helped a little but not substantially.  It does make her tired.         Relevant Medications    LamoTRIgine 50 MG TABLET SR 24 HR    Other Relevant Orders    VITAMIN D,25 HYDROXY    VITAMIN B12    HIV AG/AB COMBO ASSAY SCREENING    Moderate single current episode of major depressive disorder (HCC)     Patient thinks that the lamotrigine is helping somewhat with her mood but she could use more.  She has not had a rash.           Other Visit Diagnoses     Arthralgia of both elbows        Relevant Orders    RHEUMATOID ARTHRITIS FACTOR    RODRIGO IGG MIGUELITO W/RFLX TO RODRIGO IGG IFA          History of present illness:  Nanci Mccormack 31 y.o. female presents today for multiple sclerosis in need of work-up to start Mavenclad.    Past medical history:   Past Medical History:   Diagnosis Date   • MS (multiple sclerosis) ([facility])        Past surgical history:   Past Surgical History:   Procedure Laterality Date   • [place] DENTAL SURGERY PROCEDURE  1/1/2020    Procedure: EXTRACTION, TOOTH;  Surgeon: Lefty Ansari D.D.S.;  Location: SURGERY [facility];  Service: Dental   • SUBMANDIBLE ABSCESS INCISION AND DRAINAGE  1/1/2020    Procedure: INCISION AND DRAINAGE, ABSCESS, SUBMANDIBULAR REGION;  Surgeon: Lefty Ansari D.D.SJordon;  Location: SURGERY [facility];  Service: Dental   • CYST EXCISION Right     Ear       Family history:   Family History   Problem Relation Age of Onset   • Hypertension Mother    • Cancer Mother         cervical   • No Known Problems Sister    • No Known Problems Brother    • Cancer Maternal Aunt         Leiomyosarcoma   • No Known Problems Brother    • No Known Problems Sister    • No Known Problems Daughter     • No Known Problems Daughter        Social history:   Social History     Socioeconomic History   • Marital status: Single     Spouse name: Not on file   • Number of children: Not on file   • Years of education: Not on file   • Highest education level: Not on file   Occupational History   • Not on file   Tobacco Use   • Smoking status: Never Smoker   • Smokeless tobacco: Never Used   Substance and Sexual Activity   • Alcohol use: Yes     Comment: Socially   • Drug use: Yes     Types: Inhaled, Marijuana     Comment: daily marijuana   • Sexual activity: Not on file   Other Topics Concern   • Not on file   Social History Narrative   • Not on file     Social Determinants of Health     Financial Resource Strain:    • Difficulty of Paying Living Expenses:    Food Insecurity:    • Worried About Running Out of Food in the Last Year:    • Ran Out of Food in the Last Year:    Transportation Needs:    • Lack of Transportation (Medical):    • Lack of Transportation (Non-Medical):    Physical Activity:    • Days of Exercise per Week:    • Minutes of Exercise per Session:    Stress:    • Feeling of Stress :    Social Connections:    • Frequency of Communication with Friends and Family:    • Frequency of Social Gatherings with Friends and Family:    • Attends [employer] Services:    • Active Member of Clubs or Organizations:    • Attends Club or Organization Meetings:    • Marital Status:    Intimate Partner Violence:    • Fear of Current or Ex-Partner:    • Emotionally Abused:    • Physically Abused:    • Sexually Abused:        Current medications:   Current Outpatient Medications   Medication   • LamoTRIgine 50 MG TABLET SR 24 HR   • baclofen (LIORESAL) 10 MG Tab   • ondansetron (ZOFRAN) 4 MG Tab tablet     No current facility-administered medications for this visit.       Medication Allergy:  Allergies   Allergen Reactions   • Pcn [Penicillins]      Hives         Review of systems:   Constitutional: denies fever, night sweats,  "weight loss.   Eyes: denies acute vision change, eye pain or secretion.   Ears, Nose, Mouth, Throat: denies nasal secretion, nasal bleeding, difficulty swallowing, hearing loss, tinnitus, vertigo, ear pain, acute dental problems, oral ulcers or lesions.   Endocrine: denies recent weight changes, heat or cold intolerance, polyuria, polydypsia, polyphagia,abnormal hair growth.  Cardiovascular: denies new onset of chest pain, palpitations, syncope, or dyspnea of exertion.  Pulmonary: denies shortness of breath, new onset of cough, hemoptysis, wheezing, chest pain or flu-like symptoms.   GI: denies nausea, vomiting, diarrhea, GI bleeding, change in appetite, abdominal pain, and change in bowel habits.  : denies dysuria, urinary incontinence, hematuria.  Heme/oncology: denies history of easy bruising or bleeding. No history of cancer, DVTor PE.  Allergy/immunology: denies hives/urticaria, or itching.   Dermatologic: denies new rash, or new skin lesions.  Musculoskeletal:denies joint swelling or pain, muscle pain, neck and back pain. Neurologic: denies headaches, acute visual changes, facial droopiness, muscle weakness (focal or generalized), paresthesias, anesthesia, ataxia, change in speech or language, memory loss, abnormal movements, seizures, loss of consciousness, or episodes of confusion.   Psychiatric: denies symptoms of depression, anxiety, hallucinations, mood swings or changes, suicidal or homicidal thoughts.     Physical examination:   Vitals:    11/02/21 1423   BP: 100/68   BP Location: Right arm   Patient Position: Sitting   BP Cuff Size: Adult   Pulse: 61   Temp: 36.8 °C (98.3 °F)   TempSrc: Temporal   SpO2: 96%   Weight: 41 kg (90 lb 6.2 oz)   Height: 1.549 m (5' 1\")     General: Patient in no acute distress, pleasant and cooperative.  HEENT: Normocephalic, no signs of acute trauma.   Neck: supple, no meningeal signs or carotid bruits. There is normal range of motion. No tenderness on exam.   Chest: " clear to auscultation. No cough.   CV: RRR, no murmurs.   Skin: no signs of acute rashes or trauma.   Musculoskeletal: joints exhibit full range of motion, without any pain to palpation. There are no signs of joint or muscle swelling. There is no tenderness to deep palpation of muscles.   Psychiatric: No hallucinatory behavior. Denies symptoms of depression or suicidal ideation. Mood and affect appear normal on exam.     NEUROLOGICAL EXAM:   Mental status, orientation: Awake, alert and fully oriented.   Speech and language: speech is clear and fluent. The patient is able to name, repeat and comprehend.   Memory: There is intact recollection of recent and remote events.   Cranial nerve exam: Pupils are 3-4 mm bilaterally and equally reactive to light and accommodation. Visual fields are intact by confrontation. Fundoscopic exam was unremarkable. There is no nystagmus on primary or secondary gaze. Intact full EOM in all directions of gaze. Face appears symmetric. Sensation in the face is intact to light touch. Uvula is midline. Palate elevates symmetrically. Tongue is midline and without any signs of tongue biting or fasciculations. Sternocleidomastoid muscles exhibit is normal strength bilaterally. Shoulder shrug is intact bilaterally.   Motor exam: Strength is 5/5 in all extremities on her right and 4 out of 5 on her left. Tone abnormally high in all 4 extremities. No abnormal movements were seen on exam.   Sensory exam reveals normal sense of light touch, proprioception, vibration and pinprick in all extremities.   Deep tendon reflexes:  2+ throughout. Plantar responses are flexor. There is no clonus.   Coordination: shows a normal finger-nose-finger. Normal rapidly alternating movements.   Gait: The patient was able to get up from seated position on first attempt without requiring assistance. Found to be unsteady when walking. Movements were fluid with normal arm swing. The patient was able to turn without  difficulties or tendency to fall. Romberg examination positive      ANCILLARY DATA REVIEWED:     Lab Data Review:  No results found for this or any previous visit (from the past 24 hour(s)).    Records reviewed: I reviewed previous laboratory testing the patient is due to get testing now.      Imaging: I reviewed last MRI scan of the brain consistent with multiple sclerosis and the cervical spine.          ASSESSMENT AND PLAN:    1. Multiple sclerosis (HCC)  Plan at this time is to start Mavenclad after getting laboratory testing done as below and MRI skin testing.  Patient is aware that she needs to return calls.  - LamoTRIgine 50 MG TABLET SR 24 HR; Take 50 mg by mouth at bedtime.  Dispense: 30 Tablet; Refill: 3  - VITAMIN D,25 HYDROXY; Future  - VITAMIN B12; Future  - HIV AG/AB COMBO ASSAY SCREENING; Future    2. Arthralgia of both elbows  Plan to evaluate if she has a family history and is having significant joint pain.  - RHEUMATOID ARTHRITIS FACTOR; Future  - ORDRIGO IGG MIGUELITO W/RFLX TO RODRIGO IGG IFA; Future    3. Moderate single current episode of major depressive disorder (HCC)  Plan to increase her lamotrigine to 50 mg from 25 mg p.o. nightly of long-acting lamotrigine.        FOLLOW-UP:   Return in about 3 months (around 2/2/2022).      My total time spent caring for the patient on the day of the encounter was 45 minutes.   This does not include time spent on separately billable procedures/tests.    EDUCATION AND COUNSELING:  -Discussed regular exercise program and prevention of cardiovascular disease, including stroke.   -Discussed healthy lifestyle, including: healthy diet (rich in fruits, vegetables, nuts and healthy oils); proper hydration, and adequate sleep hygiene (allowing 7-8 hrs of overnight sleep).        Melissa Bloch, MD  Clinical  of Neurology [facility].   Diplomate in Neurology.   Office: [phone]  Fax: [phone]

## 2022-03-03 ENCOUNTER — HOSPITAL ENCOUNTER (EMERGENCY)
Facility: MEDICAL CENTER | Age: 32
End: 2022-03-03
Attending: EMERGENCY MEDICINE
Payer: MEDICARE

## 2022-03-03 VITALS
OXYGEN SATURATION: 98 % | DIASTOLIC BLOOD PRESSURE: 72 MMHG | HEIGHT: 62 IN | TEMPERATURE: 98.5 F | SYSTOLIC BLOOD PRESSURE: 115 MMHG | BODY MASS INDEX: 16.88 KG/M2 | HEART RATE: 89 BPM | RESPIRATION RATE: 16 BRPM | WEIGHT: 91.71 LBS

## 2022-03-03 DIAGNOSIS — N39.0 ACUTE UTI: ICD-10-CM

## 2022-03-03 LAB
APPEARANCE UR: CLEAR
BACTERIA #/AREA URNS HPF: ABNORMAL /HPF
BILIRUB UR QL STRIP.AUTO: NEGATIVE
COLOR UR: YELLOW
GLUCOSE UR STRIP.AUTO-MCNC: NEGATIVE MG/DL
HCG UR QL: NEGATIVE
KETONES UR STRIP.AUTO-MCNC: NEGATIVE MG/DL
LEUKOCYTE ESTERASE UR QL STRIP.AUTO: NEGATIVE
MICRO URNS: ABNORMAL
NITRITE UR QL STRIP.AUTO: POSITIVE
PH UR STRIP.AUTO: 7 [PH] (ref 5–8)
PROT UR QL STRIP: NEGATIVE MG/DL
RBC # URNS HPF: ABNORMAL /HPF
RBC UR QL AUTO: NEGATIVE
SP GR UR STRIP.AUTO: 1.01
UROBILINOGEN UR STRIP.AUTO-MCNC: 0.2 MG/DL
WBC #/AREA URNS HPF: ABNORMAL /HPF

## 2022-03-03 PROCEDURE — 99283 EMERGENCY DEPT VISIT LOW MDM: CPT

## 2022-03-03 PROCEDURE — 81025 URINE PREGNANCY TEST: CPT

## 2022-03-03 PROCEDURE — 87086 URINE CULTURE/COLONY COUNT: CPT

## 2022-03-03 PROCEDURE — 81001 URINALYSIS AUTO W/SCOPE: CPT

## 2022-03-03 RX ORDER — NITROFURANTOIN 25; 75 MG/1; MG/1
100 CAPSULE ORAL 2 TIMES DAILY
Qty: 10 CAPSULE | Refills: 0 | Status: SHIPPED | OUTPATIENT
Start: 2022-03-03 | End: 2022-03-08

## 2022-03-03 RX ORDER — PHENAZOPYRIDINE HYDROCHLORIDE 200 MG/1
200 TABLET, FILM COATED ORAL 3 TIMES DAILY PRN
Qty: 6 TABLET | Refills: 0 | Status: SHIPPED | OUTPATIENT
Start: 2022-03-03 | End: 2023-10-30

## 2022-03-03 ASSESSMENT — LIFESTYLE VARIABLES: DO YOU DRINK ALCOHOL: NO

## 2022-03-03 NOTE — ED TRIAGE NOTES
"Chief Complaint   Patient presents with   • Painful Urination     Patient reports symptoms x3 days.       31 yo female to triage for above complaint. Patient denies blood in urine or flank pain.    Pt is alert and oriented, speaking in full sentences, follows commands and responds appropriately to questions.     Patient placed back in lobby and educated on triage process. Asked to inform RN of any changes.    /58   Pulse 62   Temp 36.4 °C (97.6 °F) (Temporal)   Resp 14   Ht 1.575 m (5' 2\")   Wt 41.6 kg (91 lb 11.4 oz)   SpO2 95%   BMI 16.77 kg/m²     "

## 2022-03-03 NOTE — ED PROVIDER NOTES
"ED Provider Note    CHIEF COMPLAINT  Chief Complaint   Patient presents with   • Painful Urination     Patient reports symptoms x3 days.       HPI  Nanci Mccormack is a 32 y.o. female who presents with a chief complaint of \"I think I have a bladder infection.\"  Patient has had symptoms about 3 days.  She describes burning with urination, increased frequency and incomplete voiding.  There is been no nausea or vomiting.  No fever chills.  No back pain.  She otherwise feels okay.  She points out that in the setting of MS, this is a minor inconvenience.  She has had UTIs in the past, last was about a year and a half ago.  It sounds like she was treated with Macrobid with no problem.  She does suspect that she has set up a situation which she can get a UTI.  She is been gambling quite a bit, and not leaving the table to urinate.  Also she and her significant other had intercourse, and she did not urinate afterwards, just going to sleep.  She points out that both of these things likely predispose her to getting an infection.  She denies chance of pregnancy.  Menstrual cycles normal.  No vaginal discharge.  There is no other complaint.    PAST MEDICAL HISTORY  Past Medical History:   Diagnosis Date   • MS (multiple sclerosis) ([facility])        FAMILY HISTORY  Family History   Problem Relation Age of Onset   • Hypertension Mother    • Cancer Mother         cervical   • No Known Problems Sister    • No Known Problems Brother    • Cancer Maternal Aunt         Leiomyosarcoma   • No Known Problems Brother    • No Known Problems Sister    • No Known Problems Daughter    • No Known Problems Daughter        SOCIAL HISTORY  Social History     Tobacco Use   • Smoking status: Never Smoker   • Smokeless tobacco: Never Used   Substance Use Topics   • Alcohol use: Yes     Comment: Socially   • Drug use: Yes     Types: Inhaled, Marijuana     Comment: daily marijuana         SURGICAL HISTORY  Past Surgical History:   Procedure Laterality " "Date   • MD DENTAL SURGERY PROCEDURE  1/1/2020    Procedure: EXTRACTION, TOOTH;  Surgeon: Lefty Ansari D.D.S.;  [place] [facility];  Service: Dental   • SUBMANDIBLE ABSCESS INCISION AND DRAINAGE  1/1/2020    Procedure: INCISION AND DRAINAGE, ABSCESS, SUBMANDIBULAR REGION;  Surgeon: Lefty Ansari D.D.S.;  [place] [facility] [place];  Service: Dental   • CYST EXCISION Right     Ear       CURRENT MEDICATIONS  Home Medications     Reviewed by Rianna Pinedo R.N. (Registered Nurse) on 03/03/22 at 0939  Med List Status: Partial   Medication Last Dose Status   baclofen (LIORESAL) 10 MG Tab  Active   LamoTRIgine 50 MG TABLET SR 24 HR  Active   ondansetron (ZOFRAN) 4 MG Tab tablet  Active                I have reviewed the nurses notes and/or the list brought with the patient.    ALLERGIES  Allergies   Allergen Reactions   • Pcn [Penicillins]      Hives         REVIEW OF SYSTEMS  See HPI for further details. Review of systems as above, otherwise all other systems are negative.     PHYSICAL EXAM  VITAL SIGNS: /58   Pulse 62   Temp 36.4 °C (97.6 °F) (Temporal)   Resp 14   Ht 1.575 m (5' 2\")   Wt 41.6 kg (91 lb 11.4 oz)   SpO2 95%   BMI 16.77 kg/m²     Constitutional: Well appearing patient in no acute distress.  Not toxic, nor ill in appearance.  HENT: Mucus membranes moist.  Eyes: Pupils equally round.  No scleral icterus.   Neck: Full nontender range of motion.  Cardiovascular: Regular heart rate and rhythm.  No murmurs, rubs, nor gallop appreciated.   Thorax & Lungs: Chest is nontender.  Lungs are clear to auscultation with good air movement bilaterally.  No wheeze, rhonchi, nor rales.   Abdomen: Soft, with no tenderness, rebound nor guarding.  No mass, pulsatile mass, nor hepatosplenomegaly appreciated.  Skin: No purpura nor petechia noted.  Extremities/Musculoskeletal: No sign of trauma.  Calves are nontender with no cords nor edema.  No Sp's sign.  Pulses are intact all " around.   Neurologic: Alert & oriented.  Strength and sensation is intact all around.  Gait is normal.  Psychiatric: Normal affect appropriate for the clinical situation.  LABS  Labs Reviewed   URINALYSIS,CULTURE IF INDICATED - Abnormal; Notable for the following components:       Result Value    Nitrite Positive (*)     All other components within normal limits    Narrative:     Indication for culture:->Patient WITHOUT an indwelling Foss  catheter in place with new onset of Dysuria, Frequency,  Urgency, and/or Suprapubic pain   URINE MICROSCOPIC (W/UA) - Abnormal; Notable for the following components:    Bacteria Few (*)     All other components within normal limits    Narrative:     Indication for culture:->Patient WITHOUT an indwelling Foss  catheter in place with new onset of Dysuria, Frequency,  Urgency, and/or Suprapubic pain   HCG QUALITATIVE UR    Narrative:     Indication for culture:->Patient WITHOUT an indwelling Foss  catheter in place with new onset of Dysuria, Frequency,  Urgency, and/or Suprapubic pain   URINE CULTURE-EXISTING-LESS THAN 48 HOURS       MEDICAL RECORD  I have reviewed patient's medical record and pertinent results are listed above.    COURSE & MEDICAL DECISION MAKING  I have reviewed any medical record information, laboratory studies and radiographic results as noted above.  The patient presents with dysuria, and symptoms suggesting urinary tract infection.  No evidence clinically of an upper tract infection.  We will go ahead and start her on Macrobid.  I did add on a culture.  She is not pregnant.  We will put her on some Pyridium for bladder spasm.  Instructions on UTI.      FINAL IMPRESSION  1. Acute UTI           This dictation was created using voice recognition software.    Electronically signed by: Markel Vasquez M.D., 3/3/2022 10:44 AM

## 2022-03-05 LAB
BACTERIA UR CULT: NORMAL
SIGNIFICANT IND 70042: NORMAL
SITE SITE: NORMAL
SOURCE SOURCE: NORMAL

## 2022-03-09 ENCOUNTER — HOSPITAL ENCOUNTER (EMERGENCY)
Facility: MEDICAL CENTER | Age: 32
End: 2022-03-09
Attending: EMERGENCY MEDICINE
Payer: MEDICARE

## 2022-03-09 VITALS
TEMPERATURE: 97.6 F | DIASTOLIC BLOOD PRESSURE: 84 MMHG | SYSTOLIC BLOOD PRESSURE: 118 MMHG | WEIGHT: 90.61 LBS | RESPIRATION RATE: 16 BRPM | OXYGEN SATURATION: 95 % | HEIGHT: 62 IN | HEART RATE: 87 BPM | BODY MASS INDEX: 16.67 KG/M2

## 2022-03-09 DIAGNOSIS — N30.90 CYSTITIS: ICD-10-CM

## 2022-03-09 LAB
APPEARANCE UR: CLEAR
BILIRUB UR QL STRIP.AUTO: NEGATIVE
COLOR UR: YELLOW
GLUCOSE UR STRIP.AUTO-MCNC: NEGATIVE MG/DL
KETONES UR STRIP.AUTO-MCNC: NEGATIVE MG/DL
LEUKOCYTE ESTERASE UR QL STRIP.AUTO: NEGATIVE
MICRO URNS: NORMAL
NITRITE UR QL STRIP.AUTO: NEGATIVE
PH UR STRIP.AUTO: 7 [PH] (ref 5–8)
PROT UR QL STRIP: NEGATIVE MG/DL
RBC UR QL AUTO: NEGATIVE
SP GR UR STRIP.AUTO: 1
UROBILINOGEN UR STRIP.AUTO-MCNC: 0.2 MG/DL

## 2022-03-09 PROCEDURE — 81003 URINALYSIS AUTO W/O SCOPE: CPT

## 2022-03-09 PROCEDURE — 99283 EMERGENCY DEPT VISIT LOW MDM: CPT

## 2022-03-09 NOTE — ED TRIAGE NOTES
"Chief Complaint   Patient presents with   • Painful Urination     Pt seen here on 03/03 and diagnosed with a UTI and discharged with antibiotics. Pt states she feels like her symptoms are improving but now thinks something is wrong with her kidneys because she drinks heavily.      /86   Pulse 73   Temp 36.4 °C (97.5 °F) (Temporal)   Resp 16   Ht 1.575 m (5' 2\")   Wt 41.1 kg (90 lb 9.7 oz)   LMP 03/07/2022   SpO2 99%   BMI 16.57 kg/m²     Pt ambulatory to triage for above, provided urine sample cup. Pt refusing blood draw in triage.   "

## 2022-03-09 NOTE — ED NOTES
Pt given discharge instructions. Pt verbalized understanding. RN to answer any questions pt had. Pt instructed on care at home and sx to return for. VSS. Pt ambulated out to front [facility].

## 2022-03-09 NOTE — ED PROVIDER NOTES
ED Provider Note    CHIEF COMPLAINT  Chief Complaint   Patient presents with   • Painful Urination     Pt seen here on 03/03 and diagnosed with a UTI and discharged with antibiotics. Pt states she feels like her symptoms are improving but now thinks something is wrong with her kidneys because she drinks heavily.        HPI  Nanci Mccormack is a 32 y.o. female who presents for evaluation for dysuria.  The patient states she was diagnosed with a urinary tract infection on 3 March.  She states her symptoms seem to be improving with antibiotics.  She wants to make sure that her urinary tract infection was appropriately treated.  She says she is also concerned that she does drink on a daily basis and is concerned they be this is damaging her kidneys.  She does not have any abdominal pain.  She does not have any nausea or vomiting.  She does not have any associated fevers.  She does not have a primary care provider.  She states she still has some mild discomfort at the end of urination.    REVIEW OF SYSTEMS  See HPI for further details. All other systems are negative.     PAST MEDICAL HISTORY  Past Medical History:   Diagnosis Date   • MS (multiple sclerosis) ([facility])        FAMILY HISTORY  [unfilled]    SOCIAL HISTORY  Social History     Socioeconomic History   • Marital status: Single   Tobacco Use   • Smoking status: Never Smoker   • Smokeless tobacco: Never Used   Substance and Sexual Activity   • Alcohol use: Yes     Comment: Socially   • Drug use: Yes     Types: Inhaled, Marijuana     Comment: daily marijuana       SURGICAL HISTORY  Past Surgical History:   Procedure Laterality Date   • [place] DENTAL SURGERY PROCEDURE  1/1/2020    Procedure: EXTRACTION, TOOTH;  Surgeon: Lefty Ansari D.D.SJordon;  Location: SURGERY [facility];  Service: Dental   • SUBMANDIBLE ABSCESS INCISION AND DRAINAGE  1/1/2020    Procedure: INCISION AND DRAINAGE, ABSCESS, SUBMANDIBULAR REGION;  Surgeon: Lefty Ansari D.D.S.;  Location: SURGERY  [facility];  Service: Dental   • CYST EXCISION Right     Ear       CURRENT MEDICATIONS  Home Medications     Reviewed by Mable Garg R.N. (Registered Nurse) on 03/09/22 at 0809  Med List Status: Partial   Medication Last Dose Status   baclofen (LIORESAL) 10 MG Tab  Active   LamoTRIgine 50 MG TABLET SR 24 HR  Active   ondansetron (ZOFRAN) 4 MG Tab tablet  Active   phenazopyridine (PYRIDIUM) 200 MG Tab  Active                ALLERGIES  Allergies   Allergen Reactions   • Pcn [Penicillins]      Hives         PHYSICAL EXAM  VITAL SIGNS: /86   Pulse 73   Temp 36.4 °C (97.5 °F) (Temporal)   Resp 16   Ht 1.575 m (5' 2\")   Wt 41.1 kg (90 lb 9.7 oz)   LMP 03/07/2022   SpO2 99%   BMI 16.57 kg/m²       Constitutional: Cachectic but no acute distress   HENT: Normocephalic, Atraumatic, Bilateral external ears normal, Oropharynx moist, No oral exudates, Nose normal.   Eyes: PERRLA, EOMI, Conjunctiva normal, No discharge.   Neck: Normal range of motion, No tenderness, Supple, No stridor.   Lymphatic: No lymphadenopathy noted.   Cardiovascular: Normal heart rate, Normal rhythm, No murmurs, No rubs, No gallops.   Thorax & Lungs: Normal breath sounds, No respiratory distress, No wheezing, No chest tenderness.   Abdomen: Bowel sounds normal, Soft, No tenderness, No masses, No pulsatile masses.   Skin: Warm, Dry, No erythema, No rash.   Back: No tenderness, No CVA tenderness.   Extremities: Intact distal pulses, No edema, No tenderness, No cyanosis, No clubbing.   Neurologic: Alert & oriented x 3, Normal motor function, Normal sensory function, No focal deficits noted.   Psychiatric: Affect normal, Judgment normal, Mood normal.     Results for orders placed or performed during the hospital encounter of 03/09/22   URINALYSIS,CULTURE IF INDICATED    Specimen: Urine, Clean Catch   Result Value Ref Range    Color Yellow     Character Clear     Specific Gravity 1.002 <1.035    Ph 7.0 5.0 - 8.0    Glucose Negative " Negative mg/dL    Ketones Negative Negative mg/dL    Protein Negative Negative mg/dL    Bilirubin Negative Negative    Urobilinogen, Urine 0.2 Negative    Nitrite Negative Negative    Leukocyte Esterase Negative Negative    Occult Blood Negative Negative    Micro Urine Req see below        COURSE & MEDICAL DECISION MAKING  Pertinent Labs & Imaging studies reviewed. (See chart for details)  This a 32-year-old female who presents for repeat exam after she was recently diagnosed with a urinary tract infection.  Urinalysis shows that the infection was appropriately treated.  The patient's abdomen is benign she does not have any CVA tenderness.  She states that this time she does not feel like she needs help for her alcohol utilization.  She states she stopped for couple of days while she was on antibiotics.  She does appear cachectic and could possibly have some malnourishment.  I would like the patient to follow-up with the [facility] for routine health maintenance.    FINAL IMPRESSION  1.  Recheck for urinary tract infection  2.  Alcohol abuse      Disposition  The patient will be discharged in stable condition         Electronically signed by: Barber Azar M.D., 3/9/2022 9:55 AM

## 2022-03-09 NOTE — DISCHARGE INSTRUCTIONS
Drink lots of fluids and utilize sitz bath as discussed.  Follow-up with [facility] in 1 week for routine health maintenance.

## 2022-05-22 ENCOUNTER — HOSPITAL ENCOUNTER (EMERGENCY)
Facility: MEDICAL CENTER | Age: 32
End: 2022-05-22
Attending: EMERGENCY MEDICINE
Payer: MEDICARE

## 2022-05-22 ENCOUNTER — APPOINTMENT (OUTPATIENT)
Dept: RADIOLOGY | Facility: MEDICAL CENTER | Age: 32
End: 2022-05-22
Attending: EMERGENCY MEDICINE
Payer: MEDICARE

## 2022-05-22 VITALS
SYSTOLIC BLOOD PRESSURE: 114 MMHG | OXYGEN SATURATION: 97 % | DIASTOLIC BLOOD PRESSURE: 58 MMHG | TEMPERATURE: 98.7 F | HEART RATE: 89 BPM | BODY MASS INDEX: 17.85 KG/M2 | HEIGHT: 62 IN | WEIGHT: 97 LBS | RESPIRATION RATE: 17 BRPM

## 2022-05-22 DIAGNOSIS — M54.50 BILATERAL LOW BACK PAIN WITHOUT SCIATICA, UNSPECIFIED CHRONICITY: ICD-10-CM

## 2022-05-22 DIAGNOSIS — R11.2 NON-INTRACTABLE VOMITING WITH NAUSEA, UNSPECIFIED VOMITING TYPE: ICD-10-CM

## 2022-05-22 LAB
ALBUMIN SERPL BCP-MCNC: 4.8 G/DL (ref 3.2–4.9)
ALBUMIN/GLOB SERPL: 1.7 G/DL
ALP SERPL-CCNC: 59 U/L (ref 30–99)
ALT SERPL-CCNC: 14 U/L (ref 2–50)
ANION GAP SERPL CALC-SCNC: 13 MMOL/L (ref 7–16)
APPEARANCE UR: CLEAR
AST SERPL-CCNC: 24 U/L (ref 12–45)
BACTERIA #/AREA URNS HPF: NEGATIVE /HPF
BASOPHILS # BLD AUTO: 0 % (ref 0–1.8)
BASOPHILS # BLD: 0 K/UL (ref 0–0.12)
BILIRUB SERPL-MCNC: 0.4 MG/DL (ref 0.1–1.5)
BILIRUB UR QL STRIP.AUTO: NEGATIVE
BLOOD CULTURE HOLD CXBCH: NORMAL
BUN SERPL-MCNC: 5 MG/DL (ref 8–22)
CALCIUM SERPL-MCNC: 9.6 MG/DL (ref 8.5–10.5)
CHLORIDE SERPL-SCNC: 99 MMOL/L (ref 96–112)
CO2 SERPL-SCNC: 19 MMOL/L (ref 20–33)
COLOR UR: YELLOW
CREAT SERPL-MCNC: 0.65 MG/DL (ref 0.5–1.4)
EOSINOPHIL # BLD AUTO: 0 K/UL (ref 0–0.51)
EOSINOPHIL NFR BLD: 0 % (ref 0–6.9)
EPI CELLS #/AREA URNS HPF: ABNORMAL /HPF
ERYTHROCYTE [DISTWIDTH] IN BLOOD BY AUTOMATED COUNT: 44.3 FL (ref 35.9–50)
FLUAV RNA SPEC QL NAA+PROBE: NEGATIVE
FLUBV RNA SPEC QL NAA+PROBE: NEGATIVE
GFR SERPLBLD CREATININE-BSD FMLA CKD-EPI: 120 ML/MIN/1.73 M 2
GLOBULIN SER CALC-MCNC: 2.9 G/DL (ref 1.9–3.5)
GLUCOSE SERPL-MCNC: 96 MG/DL (ref 65–99)
GLUCOSE UR STRIP.AUTO-MCNC: NEGATIVE MG/DL
HCG SERPL QL: NEGATIVE
HCT VFR BLD AUTO: 42 % (ref 37–47)
HGB BLD-MCNC: 14.5 G/DL (ref 12–16)
HYALINE CASTS #/AREA URNS LPF: ABNORMAL /LPF
KETONES UR STRIP.AUTO-MCNC: 15 MG/DL
LEUKOCYTE ESTERASE UR QL STRIP.AUTO: ABNORMAL
LIPASE SERPL-CCNC: 20 U/L (ref 11–82)
LYMPHOCYTES # BLD AUTO: 0.3 K/UL (ref 1–4.8)
LYMPHOCYTES NFR BLD: 2.6 % (ref 22–41)
MANUAL DIFF BLD: NORMAL
MCH RBC QN AUTO: 33.4 PG (ref 27–33)
MCHC RBC AUTO-ENTMCNC: 34.5 G/DL (ref 33.6–35)
MCV RBC AUTO: 96.8 FL (ref 81.4–97.8)
MICRO URNS: ABNORMAL
MONOCYTES # BLD AUTO: 0.5 K/UL (ref 0–0.85)
MONOCYTES NFR BLD AUTO: 4.4 % (ref 0–13.4)
MORPHOLOGY BLD-IMP: NORMAL
NEUTROPHILS # BLD AUTO: 10.6 K/UL (ref 2–7.15)
NEUTROPHILS NFR BLD: 93 % (ref 44–72)
NITRITE UR QL STRIP.AUTO: NEGATIVE
NRBC # BLD AUTO: 0 K/UL
NRBC BLD-RTO: 0 /100 WBC
PH UR STRIP.AUTO: 6 [PH] (ref 5–8)
PLATELET # BLD AUTO: 268 K/UL (ref 164–446)
PLATELET BLD QL SMEAR: NORMAL
PMV BLD AUTO: 9 FL (ref 9–12.9)
POTASSIUM SERPL-SCNC: 3.7 MMOL/L (ref 3.6–5.5)
PROT SERPL-MCNC: 7.7 G/DL (ref 6–8.2)
PROT UR QL STRIP: NEGATIVE MG/DL
RBC # BLD AUTO: 4.34 M/UL (ref 4.2–5.4)
RBC # URNS HPF: ABNORMAL /HPF
RBC BLD AUTO: NORMAL
RBC UR QL AUTO: NEGATIVE
RSV RNA SPEC QL NAA+PROBE: NEGATIVE
SARS-COV-2 RNA RESP QL NAA+PROBE: NOTDETECTED
SODIUM SERPL-SCNC: 131 MMOL/L (ref 135–145)
SP GR UR STRIP.AUTO: 1.01
SPECIMEN SOURCE: NORMAL
UROBILINOGEN UR STRIP.AUTO-MCNC: 0.2 MG/DL
WBC # BLD AUTO: 11.4 K/UL (ref 4.8–10.8)
WBC #/AREA URNS HPF: ABNORMAL /HPF

## 2022-05-22 PROCEDURE — 99284 EMERGENCY DEPT VISIT MOD MDM: CPT

## 2022-05-22 PROCEDURE — 71045 X-RAY EXAM CHEST 1 VIEW: CPT

## 2022-05-22 PROCEDURE — 0241U HCHG SARS-COV-2 COVID-19 NFCT DS RESP RNA 4 TRGT MIC: CPT

## 2022-05-22 PROCEDURE — 700111 HCHG RX REV CODE 636 W/ 250 OVERRIDE (IP): Performed by: EMERGENCY MEDICINE

## 2022-05-22 PROCEDURE — C9803 HOPD COVID-19 SPEC COLLECT: HCPCS | Performed by: EMERGENCY MEDICINE

## 2022-05-22 PROCEDURE — 96375 TX/PRO/DX INJ NEW DRUG ADDON: CPT

## 2022-05-22 PROCEDURE — 80053 COMPREHEN METABOLIC PANEL: CPT

## 2022-05-22 PROCEDURE — 700105 HCHG RX REV CODE 258: Performed by: EMERGENCY MEDICINE

## 2022-05-22 PROCEDURE — 83690 ASSAY OF LIPASE: CPT

## 2022-05-22 PROCEDURE — 96374 THER/PROPH/DIAG INJ IV PUSH: CPT

## 2022-05-22 PROCEDURE — 87086 URINE CULTURE/COLONY COUNT: CPT

## 2022-05-22 PROCEDURE — 84703 CHORIONIC GONADOTROPIN ASSAY: CPT

## 2022-05-22 PROCEDURE — 85007 BL SMEAR W/DIFF WBC COUNT: CPT

## 2022-05-22 PROCEDURE — 85025 COMPLETE CBC W/AUTO DIFF WBC: CPT

## 2022-05-22 PROCEDURE — 36415 COLL VENOUS BLD VENIPUNCTURE: CPT

## 2022-05-22 PROCEDURE — 81001 URINALYSIS AUTO W/SCOPE: CPT

## 2022-05-22 RX ORDER — ONDANSETRON 4 MG/1
4 TABLET, ORALLY DISINTEGRATING ORAL EVERY 6 HOURS PRN
Qty: 10 TABLET | Refills: 0 | Status: SHIPPED | OUTPATIENT
Start: 2022-05-22 | End: 2023-01-26 | Stop reason: SDUPTHER

## 2022-05-22 RX ORDER — MORPHINE SULFATE 4 MG/ML
4 INJECTION INTRAVENOUS ONCE
Status: COMPLETED | OUTPATIENT
Start: 2022-05-22 | End: 2022-05-22

## 2022-05-22 RX ORDER — SODIUM CHLORIDE 9 MG/ML
1000 INJECTION, SOLUTION INTRAVENOUS ONCE
Status: COMPLETED | OUTPATIENT
Start: 2022-05-22 | End: 2022-05-22

## 2022-05-22 RX ORDER — ONDANSETRON 2 MG/ML
4 INJECTION INTRAMUSCULAR; INTRAVENOUS ONCE
Status: COMPLETED | OUTPATIENT
Start: 2022-05-22 | End: 2022-05-22

## 2022-05-22 RX ORDER — KETOROLAC TROMETHAMINE 30 MG/ML
15 INJECTION, SOLUTION INTRAMUSCULAR; INTRAVENOUS ONCE
Status: COMPLETED | OUTPATIENT
Start: 2022-05-22 | End: 2022-05-22

## 2022-05-22 RX ADMIN — ONDANSETRON 4 MG: 2 INJECTION INTRAMUSCULAR; INTRAVENOUS at 17:46

## 2022-05-22 RX ADMIN — KETOROLAC TROMETHAMINE 15 MG: 30 INJECTION, SOLUTION INTRAMUSCULAR; INTRAVENOUS at 17:46

## 2022-05-22 RX ADMIN — SODIUM CHLORIDE 1000 ML: 9 INJECTION, SOLUTION INTRAVENOUS at 17:46

## 2022-05-22 RX ADMIN — MORPHINE SULFATE 4 MG: 4 INJECTION INTRAVENOUS at 17:46

## 2022-05-22 NOTE — ED TRIAGE NOTES
"Chief Complaint   Patient presents with   • Cough   • Generalized Body Aches     Started last night. Pt has hx of MS.    • N/V     Pt reports she can't keep anything down.          Pt wheeled to triage for above complaint. Pt appears to be in pain in triage and having spouse hold legs up.     Pt is AO x 4, follows commands, and responds appropriately to questions. Patient's breathing is unlbored and pain is currently 8/10 on the 0-10 pain scale.  Pt placed in lobby. Patient educated on triage process and encouraged to alert staff for any changes.      /86   Pulse 94   Temp 37.1 °C (98.7 °F) (Temporal)   Resp (!) 22   Ht 1.575 m (5' 2\")   Wt 44 kg (97 lb)   SpO2 97%     "

## 2022-05-23 NOTE — ED PROVIDER NOTES
ED Provider Note    CHIEF COMPLAINT  Chief Complaint   Patient presents with   • Cough   • Generalized Body Aches     Started last night. Pt has hx of MS.    • N/V     Pt reports she can't keep anything down.        HPI  Nanci Mccormack is a 32 y.o. female who presents to the emergency department with complaint of cough, generalized body aches, nausea and vomiting.  She states that all the symptoms started last night.  She has a history of multiple sclerosis and states she always has chronic pain and takes baclofen as well as anti-inflammatories but now the pain is increased.  She has back pain, slight cough is nonproductive, denies chest pain, fever, hematochezia, melena, dysuria.  She does have history of UTIs in the past.  REVIEW OF SYSTEMS  Positives as above. Pertinent negatives medic easier, melena, hematemesis, dysuria, abdominal pain, chest pain  All other 10 review of systems are negative    PAST MEDICAL HISTORY  Past Medical History:   Diagnosis Date   • MS (multiple sclerosis) (Formerly [facility])        FAMILY HISTORY  Noncontributory    SOCIAL HISTORY  Social History     Socioeconomic History   • Marital status: Single   Tobacco Use   • Smoking status: Never Smoker   • Smokeless tobacco: Never Used   Substance and Sexual Activity   • Alcohol use: Yes     Comment: 3-5 times weekly   • Drug use: Yes     Types: Inhaled, Marijuana     Comment: daily marijuana       SURGICAL HISTORY  Past Surgical History:   Procedure Laterality Date   • [place] DENTAL SURGERY PROCEDURE  1/1/2020    Procedure: EXTRACTION, TOOTH;  Surgeon: Lefty Ansari D.D.S.;  Location: SURGERY [facility];  Service: Dental   • SUBMANDIBLE ABSCESS INCISION AND DRAINAGE  1/1/2020    Procedure: INCISION AND DRAINAGE, ABSCESS, SUBMANDIBULAR REGION;  Surgeon: Lefty Ansari D.D.S.;  Location: SURGERY [facility];  Service: Dental   • CYST EXCISION Right     Ear       CURRENT MEDICATIONS  Home Medications     Reviewed by Kathie Fuller R.N.  "(Registered Nurse) on 05/22/22 at 1630  Med List Status: Partial   Medication Last Dose Status   baclofen (LIORESAL) 10 MG Tab  Active   LamoTRIgine 50 MG TABLET SR 24 HR  Active   ondansetron (ZOFRAN) 4 MG Tab tablet  Active   phenazopyridine (PYRIDIUM) 200 MG Tab  Active                ALLERGIES  Allergies   Allergen Reactions   • Pcn [Penicillins]      Hives         PHYSICAL EXAM  VITAL SIGNS: /65   Pulse 83   Temp 37.1 °C (98.7 °F) (Temporal)   Resp 19   Ht 1.575 m (5' 2\")   Wt 44 kg (97 lb)   LMP 05/12/2022   SpO2 99%   BMI 17.74 kg/m²      Constitutional: Well developed, Well nourished, slight acute distress, Non-toxic appearance.   Eyes: PERRLA, EOMI, Conjunctiva normal, No discharge.   Cardiovascular: Normal heart rate, Normal rhythm, No murmurs, No rubs, No gallops, and intact distal pulses.   Thorax & Lungs:  No respiratory distress, no rales, no rhonchi, No wheezing, No chest wall tenderness.   Abdomen: Scaphoid abdomen, bowel sounds normal, Soft, No tenderness, No guarding, No rebound, No pulsatile masses.   Skin: Warm, Dry, No erythema, No rash.   Musculoskeletal: Slight paravertebral muscle spasm lumbar spine  Extremities: Full range of motion, no deformity, no edema.  Neurologic: Alert & oriented x 3, No focal deficits noted, acting appropriately on exam.  Psychiatric: Anxious      LABORATORY/ECG  Results for orders placed or performed during the hospital encounter of 05/22/22   CBC WITH DIFFERENTIAL   Result Value Ref Range    WBC 11.4 (H) 4.8 - 10.8 K/uL    RBC 4.34 4.20 - 5.40 M/uL    Hemoglobin 14.5 12.0 - 16.0 g/dL    Hematocrit 42.0 37.0 - 47.0 %    MCV 96.8 81.4 - 97.8 fL    MCH 33.4 (H) 27.0 - 33.0 pg    MCHC 34.5 33.6 - 35.0 g/dL    RDW 44.3 35.9 - 50.0 fL    Platelet Count 268 164 - 446 K/uL    MPV 9.0 9.0 - 12.9 fL    Neutrophils-Polys 93.00 (H) 44.00 - 72.00 %    Lymphocytes 2.60 (L) 22.00 - 41.00 %    Monocytes 4.40 0.00 - 13.40 %    Eosinophils 0.00 0.00 - 6.90 %    " Basophils 0.00 0.00 - 1.80 %    Nucleated RBC 0.00 /100 WBC    Neutrophils (Absolute) 10.60 (H) 2.00 - 7.15 K/uL    Lymphs (Absolute) 0.30 (L) 1.00 - 4.80 K/uL    Monos (Absolute) 0.50 0.00 - 0.85 K/uL    Eos (Absolute) 0.00 0.00 - 0.51 K/uL    Baso (Absolute) 0.00 0.00 - 0.12 K/uL    NRBC (Absolute) 0.00 K/uL   COMP METABOLIC PANEL   Result Value Ref Range    Sodium 131 (L) 135 - 145 mmol/L    Potassium 3.7 3.6 - 5.5 mmol/L    Chloride 99 96 - 112 mmol/L    Co2 19 (L) 20 - 33 mmol/L    Anion Gap 13.0 7.0 - 16.0    Glucose 96 65 - 99 mg/dL    Bun 5 (L) 8 - 22 mg/dL    Creatinine 0.65 0.50 - 1.40 mg/dL    Calcium 9.6 8.5 - 10.5 mg/dL    AST(SGOT) 24 12 - 45 U/L    ALT(SGPT) 14 2 - 50 U/L    Alkaline Phosphatase 59 30 - 99 U/L    Total Bilirubin 0.4 0.1 - 1.5 mg/dL    Albumin 4.8 3.2 - 4.9 g/dL    Total Protein 7.7 6.0 - 8.2 g/dL    Globulin 2.9 1.9 - 3.5 g/dL    A-G Ratio 1.7 g/dL   LIPASE   Result Value Ref Range    Lipase 20 11 - 82 U/L   HCG QUAL SERUM   Result Value Ref Range    Beta-Hcg Qualitative Serum Negative Negative   URINALYSIS,CULTURE IF INDICATED    Specimen: Urine   Result Value Ref Range    Color Yellow     Character Clear     Specific Gravity 1.011 <1.035    Ph 6.0 5.0 - 8.0    Glucose Negative Negative mg/dL    Ketones 15 (A) Negative mg/dL    Protein Negative Negative mg/dL    Bilirubin Negative Negative    Urobilinogen, Urine 0.2 Negative    Nitrite Negative Negative    Leukocyte Esterase Small (A) Negative    Occult Blood Negative Negative    Micro Urine Req Microscopic    Blood Culture,Hold   Result Value Ref Range    Blood Culture Hold Collected    CoV-2, FLU A/B, and RSV by PCR (2-4 Hours CEPHEID) : Collect NP swab in VTM    Specimen: Respirate   Result Value Ref Range    Influenza virus A RNA Negative Negative    Influenza virus B, PCR Negative Negative    RSV, PCR Negative Negative    SARS-CoV-2 by PCR NotDetected     SARS-CoV-2 Source NP Swab    ESTIMATED GFR   Result Value Ref Range    GFR  (CKD-EPI) 120 >60 mL/min/1.73 m 2   DIFFERENTIAL MANUAL   Result Value Ref Range    Manual Diff Status PERFORMED    PERIPHERAL SMEAR REVIEW   Result Value Ref Range    Peripheral Smear Review see below    PLATELET ESTIMATE   Result Value Ref Range    Plt Estimation Normal    MORPHOLOGY   Result Value Ref Range    RBC Morphology Normal    URINE MICROSCOPIC (W/UA)   Result Value Ref Range    WBC 5-10 (A) /hpf    RBC 2-5 (A) /hpf    Bacteria Negative None /hpf    Epithelial Cells Few /hpf    Hyaline Cast 0-2 /lpf         RADIOLOGY/PROCEDURES  DX-CHEST-PORTABLE (1 VIEW)   Final Result      1.  There is no acute cardiopulmonary process.            COURSE & MEDICAL DECISION MAKING  Pertinent Labs & Imaging studies reviewed. (See chart for details)  This is a pleasant 32-year-old female presents with back pain, nausea and vomiting.  Here in the emergency department, she has evidence of slight dehydration with acidosis 19 and ketones in her urine.  She received 1 L normal saline IV fluid, Zofran 4 mg and morphine 4 mg.  The patient has no evidence of a urinary tract infection with negative bacteria, trace leukocyte esterase on her last presentation she was treated for that she had a negative culture.  I will not be giving her antibiotics currently.  On reevaluation, she states she feels much better.  Although she has 11,000 white cell count, she has no focal infection, she has no evidence of pneumonia, urinary tract infection, she has a soft, nontender, nonsurgical abdomen.  The patient was positive p.o. and discharged with strict return precautions.      FINAL IMPRESSION     1. Non-intractable vomiting with nausea, unspecified vomiting type    2. Bilateral low back pain without sciatica, unspecified chronicity      DISPOSITION:  Patient will be discharged home in stable condition.    FOLLOW UP:  [facility], Emergency Dept  [address] [address] [phone]    If symptoms  worsen      OUTPATIENT MEDICATIONS:  New Prescriptions    ONDANSETRON (ZOFRAN ODT) 4 MG TABLET DISPERSIBLE    Take 1 Tablet by mouth every 6 hours as needed for Nausea.         Electronically signed by: Ricardo Crowley D.O., 5/22/2022 5:04 PM

## 2022-05-25 LAB
BACTERIA UR CULT: NORMAL
SIGNIFICANT IND 70042: NORMAL
SITE SITE: NORMAL
SOURCE SOURCE: NORMAL

## 2023-01-04 ENCOUNTER — TELEPHONE (OUTPATIENT)
Dept: SCHEDULING | Facility: IMAGING CENTER | Age: 33
End: 2023-01-04

## 2023-01-05 ENCOUNTER — OFFICE VISIT (OUTPATIENT)
Dept: MEDICAL GROUP | Facility: MEDICAL CENTER | Age: 33
End: 2023-01-05
Payer: MEDICARE

## 2023-01-05 VITALS
HEIGHT: 62 IN | DIASTOLIC BLOOD PRESSURE: 74 MMHG | RESPIRATION RATE: 17 BRPM | BODY MASS INDEX: 16.53 KG/M2 | HEART RATE: 101 BPM | WEIGHT: 89.84 LBS | SYSTOLIC BLOOD PRESSURE: 97 MMHG | OXYGEN SATURATION: 99 % | TEMPERATURE: 98.4 F

## 2023-01-05 DIAGNOSIS — G35 MULTIPLE SCLEROSIS (HCC): ICD-10-CM

## 2023-01-05 DIAGNOSIS — F31.9 BIPOLAR 1 DISORDER (HCC): ICD-10-CM

## 2023-01-05 DIAGNOSIS — G89.4 CHRONIC PAIN SYNDROME: ICD-10-CM

## 2023-01-05 DIAGNOSIS — F33.2 SEVERE EPISODE OF RECURRENT MAJOR DEPRESSIVE DISORDER, WITHOUT PSYCHOTIC FEATURES (HCC): ICD-10-CM

## 2023-01-05 DIAGNOSIS — Z30.011 ENCOUNTER FOR INITIAL PRESCRIPTION OF CONTRACEPTIVE PILLS: ICD-10-CM

## 2023-01-05 DIAGNOSIS — Z00.00 ENCOUNTER FOR MEDICAL EXAMINATION TO ESTABLISH CARE: ICD-10-CM

## 2023-01-05 PROBLEM — L29.9 PRURITUS: Status: RESOLVED | Noted: 2020-06-10 | Resolved: 2023-01-05

## 2023-01-05 PROBLEM — K04.7 DENTAL ABSCESS: Status: RESOLVED | Noted: 2020-01-01 | Resolved: 2023-01-05

## 2023-01-05 PROBLEM — R11.0 CHRONIC NAUSEA: Status: RESOLVED | Noted: 2020-06-30 | Resolved: 2023-01-05

## 2023-01-05 LAB
INT CON NEG: NORMAL
INT CON POS: NORMAL
POC URINE PREGNANCY TEST: NEGATIVE

## 2023-01-05 PROCEDURE — 99204 OFFICE O/P NEW MOD 45 MIN: CPT | Performed by: STUDENT IN AN ORGANIZED HEALTH CARE EDUCATION/TRAINING PROGRAM

## 2023-01-05 PROCEDURE — 81025 URINE PREGNANCY TEST: CPT | Performed by: STUDENT IN AN ORGANIZED HEALTH CARE EDUCATION/TRAINING PROGRAM

## 2023-01-05 RX ORDER — DROSPIRENONE AND ETHINYL ESTRADIOL 0.02-3(28)
1 KIT ORAL DAILY
Qty: 84 TABLET | Refills: 3 | Status: SHIPPED | OUTPATIENT
Start: 2023-01-05 | End: 2023-07-31

## 2023-01-05 RX ORDER — LAMOTRIGINE 50 MG/1
50 TABLET, EXTENDED RELEASE ORAL
Qty: 30 TABLET | Refills: 3 | Status: SHIPPED | OUTPATIENT
Start: 2023-01-05 | End: 2023-06-29

## 2023-01-05 RX ORDER — BACLOFEN 10 MG/1
10 TABLET ORAL 3 TIMES DAILY
Qty: 90 TABLET | Refills: 5 | Status: SHIPPED | OUTPATIENT
Start: 2023-01-05 | End: 2023-09-12 | Stop reason: SDUPTHER

## 2023-01-05 ASSESSMENT — PATIENT HEALTH QUESTIONNAIRE - PHQ9
CLINICAL INTERPRETATION OF PHQ2 SCORE: 4
5. POOR APPETITE OR OVEREATING: 3 - NEARLY EVERY DAY
SUM OF ALL RESPONSES TO PHQ QUESTIONS 1-9: 21

## 2023-01-05 ASSESSMENT — FIBROSIS 4 INDEX: FIB4 SCORE: 0.77

## 2023-01-05 NOTE — PROGRESS NOTES
Subjective:     CC:  Diagnoses of Encounter for medical examination to establish care, Severe episode of recurrent major depressive disorder, without psychotic features (HCC), Multiple sclerosis (HCC), Chronic pain syndrome, Bipolar 1 disorder (HCC), and Encounter for initial prescription of contraceptive pills were pertinent to this visit.    HISTORY OF THE PRESENT ILLNESS: Patient is a 32 y.o. female. This pleasant patient is here today to establish care and discuss the following.    Problem   Bipolar 1 Disorder (Hcc)    Patient has a diagnosis of bipolar disorder.  She has been on Lamictal in the past but unfortunately has been out of this medication has been off of it for several months.  She notes that her mood swings are coming back and that she is noticeably more depressed and has a difficult time dealing with things now that she is off of her medication.  She tolerated it well when she was on it and would like to restart today.     Encounter for Initial Prescription of Contraceptive Pills    She is currently sexually active with 1 male partner.  He has been on both birth control pills in the past and the shots.  Depo did make her bleed excessively so she did not want to continue this.  Open to restarting OCPs today     Severe episode of recurrent major depressive disorder, without psychotic features (HCC)    This is a chronic issue that has worsened since she stopped taking her Lamictal.  She would like to restart this.  She denies active suicidal ideation and states that while some days she thinks it would be easier if she were not around she does recognize that her family relies on her and she would never endanger herself intentionally.  In the past she has had these thoughts but states that she would check her self into a facility prior to any attempt at self-harm.        1/5/2023 6/30/2020 5/26/2020 10/12/2017   PHQ-9 Screening   Little interest or pleasure in doing things 2 - more than half the days 0 -  not at all 0 - not at all 0 - not at all   Feeling down, depressed, or hopeless 2 - more than half the days 0 - not at all 2 - more than half the days 0 - not at all   Trouble falling or staying asleep, or sleeping too much 3 - nearly every day  3 - nearly every day    Feeling tired or having little energy 3 - nearly every day  0 - not at all    Poor appetite or overeating 3 - nearly every day  2 - more than half the days    Feeling bad about yourself - or that you are a failure or have let yourself or your family down 2 - more than half the days  0 - not at all    Trouble concentrating on things, such as reading the newspaper or watching television 2 - more than half the days  2 - more than half the days    Moving or speaking so slowly that other people could have noticed. Or the opposite - being so fidgety or restless that you have been moving around a lot more than usual 2 - more than half the days  2 - more than half the days    Thoughts that you would be better off dead, or of hurting yourself in some way 2 - more than half the days  0 - not at all    PHQ-2 Total Score 4 0 2 0   PHQ-9 Total Score 21  11        Multiple values from one day are sorted in reverse-chronological order       Interpretation of PHQ-9 Total Score   Score Severity   1-4 No Depression   5-9 Mild Depression   10-14 Moderate Depression   15-19 Moderately Severe Depression   20-27 Severe Depression       Multiple sclerosis (HCC)    This is a chronic condition.  She follows with neurology, but has not seen them in over a year.  She is mostly taking baclofen to help with spasticity, and was taking another medication to help with her MS but has been off of this since she last saw her neurologist.  She does have an appointment coming up with them at the end of this month.     Chronic Pain Syndrome    This is a chronic condition secondary to MS.  She used to use opiates but managed to get her self off of them and now uses mostly Tylenol and  "baclofen to help with the pain.  She does smoke marijuana which helps with her pain     Chronic Nausea (Resolved)   Pruritus (Resolved)   Dental Abscess (Resolved)       ROS:   ROS      Objective:     Exam: BP 97/74 (BP Location: Left arm, Patient Position: Sitting, BP Cuff Size: Adult)   Pulse (!) 101   Temp 36.9 °C (98.4 °F) (Temporal)   Resp 17   Ht 1.575 m (5' 2\")   Wt 40.8 kg (89 lb 13.4 oz)   SpO2 99%  Body mass index is 16.43 kg/m².    Physical Exam  Vitals reviewed.   Constitutional:       General: She is not in acute distress.     Appearance: She is not toxic-appearing.   HENT:      Head: Normocephalic and atraumatic.      Right Ear: External ear normal.      Left Ear: External ear normal.   Eyes:      General:         Right eye: No discharge.         Left eye: No discharge.      Extraocular Movements: Extraocular movements intact.      Conjunctiva/sclera: Conjunctivae normal.   Pulmonary:      Effort: Pulmonary effort is normal. No respiratory distress.   Skin:     General: Skin is warm and dry.   Neurological:      Mental Status: She is alert.   Psychiatric:         Mood and Affect: Mood normal.         Behavior: Behavior normal.         Thought Content: Thought content normal.         Judgment: Judgment normal.         Assessment & Plan:   32 y.o. female with the following -    1. Encounter for medical examination to establish care  History, problem list, medications and allergies reviewed.  Records requested from previous provider if applicable.    2. Severe episode of recurrent major depressive disorder, without psychotic features (HCC)  Denies suicidal ideation, restarting Lamictal today, referral to behavioral health sent for possible talk therapy  - Referral to Behavioral Health    3. Multiple sclerosis (HCC)  Restart baclofen and Lamictal today, continue to follow with neurology  - baclofen (LIORESAL) 10 MG Tab; Take 1 Tablet by mouth 3 times a day.  Dispense: 90 Tablet; Refill: 5  - " LamoTRIgine 50 MG TABLET SR 24 HR; Take 50 mg by mouth at bedtime.  Dispense: 30 Tablet; Refill: 3    4. Chronic pain syndrome  Continue baclofen, Advil and marijuana use as it seems to be helping with her pain    5. Bipolar 1 disorder (HCC)  Referral to behavioral health for talk therapy sent, Lamictal restarted.  Follow-up in 4 weeks  - LamoTRIgine 50 MG TABLET SR 24 HR; Take 50 mg by mouth at bedtime.  Dispense: 30 Tablet; Refill: 3  - Referral to Behavioral Health    6. Encounter for initial prescription of contraceptive pills  Pregnancy test negative today, yes sent to pharmacy  - drospirenone-ethinyl estradiol (DHRUV) 3-0.02 MG per tablet; Take 1 Tablet by mouth every day.  Dispense: 84 Tablet; Refill: 3  - POCT Pregnancy      HCC Gap Form    Last edited 01/05/23 14:00 PST by Isabel Medrano M.D.         Return in about 4 weeks (around 2/2/2023).    Please note that this dictation was created using voice recognition software. I have made every reasonable attempt to correct obvious errors, but I expect that there are errors of grammar and possibly content that I did not discover before finalizing the note.

## 2023-01-10 DIAGNOSIS — G35 MULTIPLE SCLEROSIS (HCC): ICD-10-CM

## 2023-01-10 RX ORDER — ONDANSETRON 4 MG/1
4 TABLET, FILM COATED ORAL EVERY 4 HOURS PRN
Qty: 20 TABLET | Refills: 3 | Status: SHIPPED | OUTPATIENT
Start: 2023-01-10 | End: 2023-01-26

## 2023-01-10 NOTE — TELEPHONE ENCOUNTER
Received request via: Pharmacy    Was the patient seen in the last year in this department?   No  L/V 11/21    Does the patient have an active prescription (recently filled or refills available) for medication(s) requested? No    Does the patient have residential Plus and need 100 day supply (blood pressure, diabetes and cholesterol meds only)? Patient does not have SCP

## 2023-01-24 ENCOUNTER — TELEPHONE (OUTPATIENT)
Dept: NEUROLOGY | Facility: MEDICAL CENTER | Age: 33
End: 2023-01-24
Payer: MEDICARE

## 2023-01-26 ENCOUNTER — OFFICE VISIT (OUTPATIENT)
Dept: MEDICAL GROUP | Facility: MEDICAL CENTER | Age: 33
End: 2023-01-26
Payer: MEDICARE

## 2023-01-26 VITALS
RESPIRATION RATE: 17 BRPM | TEMPERATURE: 98.2 F | OXYGEN SATURATION: 98 % | HEIGHT: 70 IN | HEART RATE: 81 BPM | WEIGHT: 91.05 LBS | SYSTOLIC BLOOD PRESSURE: 98 MMHG | DIASTOLIC BLOOD PRESSURE: 60 MMHG | BODY MASS INDEX: 13.03 KG/M2

## 2023-01-26 DIAGNOSIS — F31.9 BIPOLAR 1 DISORDER (HCC): ICD-10-CM

## 2023-01-26 DIAGNOSIS — R11.2 NON-INTRACTABLE VOMITING WITH NAUSEA: ICD-10-CM

## 2023-01-26 DIAGNOSIS — Z30.41 ENCOUNTER FOR SURVEILLANCE OF CONTRACEPTIVE PILLS: ICD-10-CM

## 2023-01-26 DIAGNOSIS — F51.5 NIGHTMARES ASSOCIATED WITH CHRONIC POST-TRAUMATIC STRESS DISORDER: ICD-10-CM

## 2023-01-26 DIAGNOSIS — F43.12 NIGHTMARES ASSOCIATED WITH CHRONIC POST-TRAUMATIC STRESS DISORDER: ICD-10-CM

## 2023-01-26 PROBLEM — Z30.013 ENCOUNTER FOR INITIAL PRESCRIPTION OF INJECTABLE CONTRACEPTIVE: Status: RESOLVED | Noted: 2017-10-12 | Resolved: 2023-01-26

## 2023-01-26 PROCEDURE — 99214 OFFICE O/P EST MOD 30 MIN: CPT | Performed by: STUDENT IN AN ORGANIZED HEALTH CARE EDUCATION/TRAINING PROGRAM

## 2023-01-26 RX ORDER — PRAZOSIN HYDROCHLORIDE 1 MG/1
1 CAPSULE ORAL NIGHTLY
Qty: 30 CAPSULE | Refills: 0 | Status: SHIPPED | OUTPATIENT
Start: 2023-01-26

## 2023-01-26 RX ORDER — ONDANSETRON 4 MG/1
4 TABLET, ORALLY DISINTEGRATING ORAL EVERY 6 HOURS PRN
Qty: 30 TABLET | Refills: 2 | Status: SHIPPED | OUTPATIENT
Start: 2023-01-26 | End: 2023-04-25 | Stop reason: SDUPTHER

## 2023-01-26 ASSESSMENT — FIBROSIS 4 INDEX: FIB4 SCORE: 0.77

## 2023-01-26 NOTE — PROGRESS NOTES
"Subjective:     CC: Nightmares, contraceptive pill management, bipolar 1    HPI:   Nanci presents today with    Problem   Bipolar 1 Disorder (Hcc)    Chronic, improving illness.  She states that since restarting the Lamictal 50 mg extended release about a month ago she has noted stabilization in her moods.  She feels better and back to normal.     Encounter for Surveillance of Contraceptive Pills    She is currently sexually active with 1 male partner.  He has been on both birth control pills in the past and the shots.  Depo did make her bleed excessively so she did not want to continue this.  Started Jagruti 1 month ago.  This is going well apart from nausea when she takes the medication.  No other concerns.     Nightmares Associated With Chronic Post-Traumatic Stress Disorder    This is a chronic, uncontrolled problem.  She has very vivid nightmares that make her wake up in tears and sometimes vomit in the middle of the night.  She does have history of PTSD.  In the past she has been on prazosin 1 mg nightly with good relief.  She is hoping to restart this today.     Encounter for Initial Prescription of Injectable Contraceptive (Resolved)     ROS:  ROS    Objective:     Exam:  BP 98/60 (BP Location: Left arm, Patient Position: Sitting, BP Cuff Size: Adult)   Pulse 81   Temp 36.8 °C (98.2 °F) (Temporal)   Resp 17   Ht 1.778 m (5' 10\")   Wt 41.3 kg (91 lb 0.8 oz)   SpO2 98%   BMI 13.06 kg/m²  Body mass index is 13.06 kg/m².    Physical Exam  Vitals reviewed.   Constitutional:       General: She is not in acute distress.     Appearance: She is not toxic-appearing.   HENT:      Head: Normocephalic and atraumatic.      Right Ear: External ear normal.      Left Ear: External ear normal.   Eyes:      General:         Right eye: No discharge.         Left eye: No discharge.      Extraocular Movements: Extraocular movements intact.      Conjunctiva/sclera: Conjunctivae normal.   Pulmonary:      Effort: Pulmonary " effort is normal. No respiratory distress.   Skin:     General: Skin is warm and dry.   Neurological:      Mental Status: She is alert.   Psychiatric:         Mood and Affect: Mood normal.         Behavior: Behavior normal.         Thought Content: Thought content normal.         Judgment: Judgment normal.         Assessment & Plan:     32 y.o. female with the following -     1. Nightmares associated with chronic post-traumatic stress disorder  Restart prazosin at current dosing, follow-up in 1 month to assess efficacy.  If this is working I will prescribe a longer course  - prazosin (MINIPRESS) 1 MG Cap; Take 1 Capsule by mouth every evening.  Dispense: 30 Capsule; Refill: 0    2. Bipolar 1 disorder (HCC)  Doing well on Lamictal extended release 50 mg daily.  Continue.  She does have a referral to a therapist but they are booked out several months, she will continue to look for a therapist in the community    3. Encounter for surveillance of contraceptive pills  Doing well on Jagruti, no concerns, will like to continue    4. Non-intractable vomiting with nausea  Zofran to take when she is nauseous due to medications or her MS.  - ondansetron (ZOFRAN ODT) 4 MG TABLET DISPERSIBLE; Take 1 Tablet by mouth every 6 hours as needed for Nausea/Vomiting.  Dispense: 30 Tablet; Refill: 2        Return in about 4 weeks (around 2/23/2023).    Please note that this dictation was created using voice recognition software. I have made every reasonable attempt to correct obvious errors, but I expect that there are errors of grammar and possibly content that I did not discover before finalizing the note.

## 2023-02-03 ENCOUNTER — TELEPHONE (OUTPATIENT)
Dept: NEUROLOGY | Facility: MEDICAL CENTER | Age: 33
End: 2023-02-03
Payer: MEDICARE

## 2023-02-03 NOTE — TELEPHONE ENCOUNTER
Patient is needing a PA renewal for year 2 of [employer]. Please start PA. Thank you!  -GERARDO Egan.

## 2023-02-24 ENCOUNTER — APPOINTMENT (OUTPATIENT)
Dept: MEDICAL GROUP | Facility: MEDICAL CENTER | Age: 33
End: 2023-02-24
Payer: MEDICARE

## 2023-03-23 ENCOUNTER — OFFICE VISIT (OUTPATIENT)
Dept: MEDICAL GROUP | Facility: MEDICAL CENTER | Age: 33
End: 2023-03-23
Payer: MEDICARE

## 2023-03-23 VITALS
RESPIRATION RATE: 17 BRPM | SYSTOLIC BLOOD PRESSURE: 100 MMHG | TEMPERATURE: 98.2 F | WEIGHT: 92.59 LBS | DIASTOLIC BLOOD PRESSURE: 70 MMHG | BODY MASS INDEX: 17.04 KG/M2 | HEART RATE: 89 BPM | HEIGHT: 62 IN | OXYGEN SATURATION: 98 %

## 2023-03-23 DIAGNOSIS — G43.709 CHRONIC MIGRAINE WITHOUT AURA WITHOUT STATUS MIGRAINOSUS, NOT INTRACTABLE: ICD-10-CM

## 2023-03-23 DIAGNOSIS — G35 MULTIPLE SCLEROSIS (HCC): ICD-10-CM

## 2023-03-23 DIAGNOSIS — M25.531 RIGHT WRIST PAIN: ICD-10-CM

## 2023-03-23 PROBLEM — G43.109 MIGRAINE WITH AURA AND WITHOUT STATUS MIGRAINOSUS, NOT INTRACTABLE: Status: ACTIVE | Noted: 2023-03-23

## 2023-03-23 PROBLEM — G43.009 MIGRAINE WITHOUT AURA AND WITHOUT STATUS MIGRAINOSUS, NOT INTRACTABLE: Status: ACTIVE | Noted: 2023-03-23

## 2023-03-23 PROCEDURE — 99214 OFFICE O/P EST MOD 30 MIN: CPT | Performed by: STUDENT IN AN ORGANIZED HEALTH CARE EDUCATION/TRAINING PROGRAM

## 2023-03-23 RX ORDER — MELOXICAM 15 MG/1
15 TABLET ORAL DAILY
Qty: 15 TABLET | Refills: 0 | Status: SHIPPED | OUTPATIENT
Start: 2023-03-23 | End: 2024-02-09

## 2023-03-23 RX ORDER — SUMATRIPTAN 25 MG/1
25 TABLET, FILM COATED ORAL
Qty: 10 TABLET | Refills: 3 | Status: SHIPPED | OUTPATIENT
Start: 2023-03-23 | End: 2024-02-09

## 2023-03-23 ASSESSMENT — FIBROSIS 4 INDEX: FIB4 SCORE: 0.79

## 2023-03-23 NOTE — PROGRESS NOTES
"Subjective:     CC: Migraine, wrist pain, multiple sclerosis    HPI:   Nanci presents today with    Problem   Migraine Without Aura and Without Status Migrainosus, Not Intractable    This is a chronic condition.  She has been having more frequent migraines recently.  With the migraine she is does get some numbness and tingling unclear if this is related to the MS or her migraines.  She has never been on any prescription medication but does take Aleve for migraine to help.  She is interested in trying prescription medication.     Right Wrist Pain    This is a new condition.  About a week ago she woke up from sleep and had severe pain in the right wrist.  She does not member any trauma to the area.  She does not have any swelling.  She has full range of motion of the wrist but does have pain with motion.  She has been using ibuprofen which has helped somewhat, but pain is still there.     Multiple sclerosis (HCC)    This is a chronic condition.  She follows with neurology, but has not seen them in over a year.  She is mostly taking baclofen to help with spasticity, and was taking another medication to help with her MS but has been off of this since she last saw her neurologist.  He has been having a hard time getting a hold of her neurologist and setting up appointments.  She had one scheduled but it was rescheduled prior to her visit.  She is hoping she can switch to a new neurologist that will be easier to get into.         ROS:  ROS    Objective:     Exam:  /70 (BP Location: Left arm, Patient Position: Sitting, BP Cuff Size: Adult)   Pulse 89   Temp 36.8 °C (98.2 °F) (Temporal)   Resp 17   Ht 1.575 m (5' 2\")   Wt 42 kg (92 lb 9.5 oz)   SpO2 98%   BMI 16.94 kg/m²  Body mass index is 16.94 kg/m².    Physical Exam  Vitals reviewed.   Constitutional:       General: She is not in acute distress.     Appearance: She is not toxic-appearing.   HENT:      Head: Normocephalic and atraumatic.      Right Ear: " External ear normal.      Left Ear: External ear normal.   Eyes:      General:         Right eye: No discharge.         Left eye: No discharge.      Extraocular Movements: Extraocular movements intact.      Conjunctiva/sclera: Conjunctivae normal.   Pulmonary:      Effort: Pulmonary effort is normal. No respiratory distress.   Skin:     General: Skin is warm and dry.   Neurological:      Mental Status: She is alert.   Psychiatric:         Mood and Affect: Mood normal.         Behavior: Behavior normal.         Thought Content: Thought content normal.         Judgment: Judgment normal.         Assessment & Plan:     33 y.o. female with the following -     1. Right wrist pain  This is a new condition, Mobic sent to pharmacy for pain control, x-ray ordered  - DX-WRIST-COMPLETE 3+ RIGHT; Future  - meloxicam (MOBIC) 15 MG tablet; Take 1 Tablet by mouth every day.  Dispense: 15 Tablet; Refill: 0    2. Chronic migraine without aura without status migrainosus, not intractable  Discussed starting Imitrex, patient would like to try, I am not sure that this is an aura with her migraine because she does have MS and the numbness and tingling do not seem to be only when she is having headaches  - SUMAtriptan (IMITREX) 25 MG Tab tablet; Take 1 Tablet by mouth one time as needed for Migraine for up to 1 dose.  Dispense: 10 Tablet; Refill: 3    3. Multiple sclerosis (HCC)  Patient is requesting referral to a new neurologist, referral sent  - Referral to Neurology        No follow-ups on file.    Please note that this dictation was created using voice recognition software. I have made every reasonable attempt to correct obvious errors, but I expect that there are errors of grammar and possibly content that I did not discover before finalizing the note.

## 2023-03-24 ENCOUNTER — HOSPITAL ENCOUNTER (OUTPATIENT)
Dept: RADIOLOGY | Facility: MEDICAL CENTER | Age: 33
End: 2023-03-24
Attending: STUDENT IN AN ORGANIZED HEALTH CARE EDUCATION/TRAINING PROGRAM
Payer: MEDICARE

## 2023-03-24 DIAGNOSIS — M25.531 RIGHT WRIST PAIN: ICD-10-CM

## 2023-03-24 PROCEDURE — 73110 X-RAY EXAM OF WRIST: CPT | Mod: RT

## 2023-03-31 ENCOUNTER — TELEPHONE (OUTPATIENT)
Dept: HEALTH INFORMATION MANAGEMENT | Facility: OTHER | Age: 33
End: 2023-03-31
Payer: MEDICARE

## 2023-04-14 ENCOUNTER — TELEPHONE (OUTPATIENT)
Dept: HEALTH INFORMATION MANAGEMENT | Facility: OTHER | Age: 33
End: 2023-04-14
Payer: MEDICARE

## 2023-04-25 DIAGNOSIS — R11.2 NON-INTRACTABLE VOMITING WITH NAUSEA: ICD-10-CM

## 2023-04-25 RX ORDER — ONDANSETRON 4 MG/1
4 TABLET, ORALLY DISINTEGRATING ORAL EVERY 6 HOURS PRN
Qty: 30 TABLET | Refills: 0 | Status: SHIPPED | OUTPATIENT
Start: 2023-04-25 | End: 2023-07-20 | Stop reason: SDUPTHER

## 2023-05-08 ENCOUNTER — TELEMEDICINE (OUTPATIENT)
Dept: MEDICAL GROUP | Facility: MEDICAL CENTER | Age: 33
End: 2023-05-08
Payer: MEDICARE

## 2023-05-08 VITALS — WEIGHT: 95 LBS | HEIGHT: 62 IN | BODY MASS INDEX: 17.48 KG/M2 | TEMPERATURE: 98 F

## 2023-05-08 DIAGNOSIS — F33.2 SEVERE EPISODE OF RECURRENT MAJOR DEPRESSIVE DISORDER, WITHOUT PSYCHOTIC FEATURES (HCC): ICD-10-CM

## 2023-05-08 DIAGNOSIS — M25.531 RIGHT WRIST PAIN: ICD-10-CM

## 2023-05-08 PROCEDURE — 99213 OFFICE O/P EST LOW 20 MIN: CPT | Mod: 95 | Performed by: STUDENT IN AN ORGANIZED HEALTH CARE EDUCATION/TRAINING PROGRAM

## 2023-05-08 ASSESSMENT — FIBROSIS 4 INDEX: FIB4 SCORE: 0.79

## 2023-05-08 NOTE — LETTER
[facility]  [id number]  [facility] [address]     May 8, 2023    Patient: Nanci Mccormack   YOB: 1990   Date of Visit: 5/8/2023       To Whom It May Concern:    Nanci Mccormack was seen and treated in our department on 5/8/2023. She has a emotional support animal that helps with chronic conditions. Please allow said animal to accompany her.    Sincerely,     Isabel Medrano M.D.

## 2023-05-08 NOTE — PROGRESS NOTES
Virtual Visit: Established Patient   This visit was conducted via Zoom using secure and encrypted videoconferencing technology.   The patient was in their home in the [facility].    The patient's identity was confirmed and verbal consent was obtained for this virtual visit.    Subjective:   CC:   Chief Complaint   Patient presents with    Follow-Up     Wrist pain  Need note for dog being emotional support .     Nanci Mccormack is a 33 y.o. female presenting for evaluation and management of:    She has wrist pain that has been there for a little over a month.  We did get an x-ray at the last visit which did not show any fracture.  She is concerned that this could be related to her MS but has been unable to get in with the neurologist.  She does have the phone number to schedule this.  She is hoping to see an orthopedic doctor to make sure it is not related to a bone or muscular issue.    She also has a dog that is her emotional support animal for her multiple mental health diagnoses.  She is asking for a note to keep this so that her dog moved with her to a new apartment    Current medicines (including changes today)  Current Outpatient Medications   Medication Sig Dispense Refill    ondansetron (ZOFRAN ODT) 4 MG TABLET DISPERSIBLE Take 1 Tablet by mouth every 6 hours as needed for Nausea/Vomiting. 30 Tablet 0    meloxicam (MOBIC) 15 MG tablet Take 1 Tablet by mouth every day. 15 Tablet 0    SUMAtriptan (IMITREX) 25 MG Tab tablet Take 1 Tablet by mouth one time as needed for Migraine for up to 1 dose. 10 Tablet 3    prazosin (MINIPRESS) 1 MG Cap Take 1 Capsule by mouth every evening. 30 Capsule 0    baclofen (LIORESAL) 10 MG Tab Take 1 Tablet by mouth 3 times a day. 90 Tablet 5    LamoTRIgine 50 MG TABLET SR 24 HR Take 50 mg by mouth at bedtime. 30 Tablet 3    drospirenone-ethinyl estradiol (DHRUV) 3-0.02 MG per tablet Take 1 Tablet by mouth every day. 84 Tablet 3    phenazopyridine (PYRIDIUM) 200 MG Tab  "Take 1 Tablet by mouth 3 times a day as needed. 6 Tablet 0     No current facility-administered medications for this visit.       Patient Active Problem List    Diagnosis Date Noted    Migraine without aura and without status migrainosus, not intractable 03/23/2023    Right wrist pain 03/23/2023    Bipolar 1 disorder (HCC) 01/05/2023    Encounter for surveillance of contraceptive pills 01/05/2023    Nightmares associated with chronic post-traumatic stress disorder 06/30/2020    Mood changes 05/28/2020    Spasticity 02/14/2018    Chronic low back pain without sciatica 02/14/2018    Severe episode of recurrent major depressive disorder, without psychotic features ([facility]) 02/14/2018    Marijuana use 10/12/2017    Multiple sclerosis ([facility]) 10/04/2017    Chronic pain syndrome 10/04/2017        Objective:   Temp 36.7 °C (98 °F) (Temporal)   Ht 1.575 m (5' 2\")   Wt 43.1 kg (95 lb)   BMI 17.38 kg/m²     Physical Exam:  Constitutional: Alert, no distress, well-groomed.  Skin: No rashes in visible areas.  Eye: Round. Conjunctiva clear, lids normal. No icterus.   ENMT: Lips pink without lesions, good dentition, moist mucous membranes. Phonation normal.  Neck: No masses, no thyromegaly. Moves freely without pain.  Respiratory: Unlabored respiratory effort, no cough or audible wheeze  Psych: Alert and oriented x3, normal affect and mood.     Assessment and Plan:   The following treatment plan was discussed:     1. Right wrist pain  This is a chronic condition, this has been there for over a month, x-ray showed no fracture, referral to orthopedics sent  - Referral to Orthopedics    2. Severe episode of recurrent major depressive disorder, without psychotic features ([facility])  Provided note for patient so she can have her dog as an emotional support animal    Please note that this dictation was created using voice recognition software. I have made every reasonable attempt to correct obvious errors, but I expect that there are errors " of grammar and possibly content that I did not discover before finalizing the note.      Follow-up: No follow-ups on file.

## 2023-05-22 ENCOUNTER — HOSPITAL ENCOUNTER (EMERGENCY)
Facility: MEDICAL CENTER | Age: 33
End: 2023-05-22
Payer: MEDICARE

## 2023-05-22 VITALS
SYSTOLIC BLOOD PRESSURE: 132 MMHG | DIASTOLIC BLOOD PRESSURE: 89 MMHG | HEIGHT: 62 IN | BODY MASS INDEX: 16.31 KG/M2 | TEMPERATURE: 97.8 F | RESPIRATION RATE: 18 BRPM | HEART RATE: 86 BPM | OXYGEN SATURATION: 100 % | WEIGHT: 88.63 LBS

## 2023-05-22 LAB — EKG IMPRESSION: NORMAL

## 2023-05-22 PROCEDURE — 93005 ELECTROCARDIOGRAM TRACING: CPT

## 2023-05-22 PROCEDURE — 302449 STATCHG TRIAGE ONLY (STATISTIC)

## 2023-05-22 ASSESSMENT — FIBROSIS 4 INDEX: FIB4 SCORE: 0.79

## 2023-05-23 NOTE — ED TRIAGE NOTES
Chief Complaint   Patient presents with    Chest Pain     Left sided chest pain that radiates down her left arm x3 hours. Patient states it feels like someone is sitting on her chest.  Pt denies SOB, reports taking advil around 1830. 8/10 pain.       Patient ambulatory to triage for above complaint. A&Ox4, speaking in full sentences. Patient educated on triage process and encouraged to notify staff if condition worsens. Patient to phleb in stable condition.

## 2023-07-20 DIAGNOSIS — R11.2 NON-INTRACTABLE VOMITING WITH NAUSEA: ICD-10-CM

## 2023-07-20 RX ORDER — ONDANSETRON 4 MG/1
4 TABLET, ORALLY DISINTEGRATING ORAL EVERY 6 HOURS PRN
Qty: 30 TABLET | Refills: 0 | Status: SHIPPED | OUTPATIENT
Start: 2023-07-20 | End: 2023-09-12 | Stop reason: SDUPTHER

## 2023-07-31 ENCOUNTER — OFFICE VISIT (OUTPATIENT)
Dept: MEDICAL GROUP | Facility: MEDICAL CENTER | Age: 33
End: 2023-07-31
Payer: MEDICARE

## 2023-07-31 VITALS
WEIGHT: 85.98 LBS | TEMPERATURE: 97.9 F | OXYGEN SATURATION: 98 % | RESPIRATION RATE: 16 BRPM | HEART RATE: 82 BPM | BODY MASS INDEX: 15.82 KG/M2 | HEIGHT: 62 IN | SYSTOLIC BLOOD PRESSURE: 100 MMHG | DIASTOLIC BLOOD PRESSURE: 58 MMHG

## 2023-07-31 DIAGNOSIS — Z30.013 ENCOUNTER FOR INITIAL PRESCRIPTION OF INJECTABLE CONTRACEPTIVE: ICD-10-CM

## 2023-07-31 DIAGNOSIS — G35 MULTIPLE SCLEROSIS (HCC): ICD-10-CM

## 2023-07-31 LAB
POCT INT CON NEG: NEGATIVE
POCT INT CON POS: POSITIVE
POCT URINE PREGNANCY TEST: NEGATIVE

## 2023-07-31 PROCEDURE — 3078F DIAST BP <80 MM HG: CPT | Performed by: STUDENT IN AN ORGANIZED HEALTH CARE EDUCATION/TRAINING PROGRAM

## 2023-07-31 PROCEDURE — 81025 URINE PREGNANCY TEST: CPT | Performed by: STUDENT IN AN ORGANIZED HEALTH CARE EDUCATION/TRAINING PROGRAM

## 2023-07-31 PROCEDURE — 3074F SYST BP LT 130 MM HG: CPT | Performed by: STUDENT IN AN ORGANIZED HEALTH CARE EDUCATION/TRAINING PROGRAM

## 2023-07-31 PROCEDURE — 99214 OFFICE O/P EST MOD 30 MIN: CPT | Performed by: STUDENT IN AN ORGANIZED HEALTH CARE EDUCATION/TRAINING PROGRAM

## 2023-07-31 RX ORDER — MEDROXYPROGESTERONE ACETATE 150 MG/ML
150 INJECTION, SUSPENSION INTRAMUSCULAR ONCE
Status: COMPLETED | OUTPATIENT
Start: 2023-07-31 | End: 2023-07-31

## 2023-07-31 RX ADMIN — MEDROXYPROGESTERONE ACETATE 150 MG: 150 INJECTION, SUSPENSION INTRAMUSCULAR at 08:54

## 2023-07-31 ASSESSMENT — FIBROSIS 4 INDEX: FIB4 SCORE: 0.79

## 2023-07-31 NOTE — PROGRESS NOTES
"Subjective:     CC: [place], MS    HPI:   Nanci presents today with    Problem   Encounter for Initial Prescription of Injectable Contraceptive    Patient has previously been on the pill.  She is hoping to switch to the shot which she has been on in the past because she is having difficulties keeping track of the pill.  Urine pregnancy test negative today.     Multiple sclerosis (HCC)    This is a chronic condition.  She follows with neurology, but has not seen them in over a year.  She is mostly taking baclofen to help with spasticity, and was taking another medication to help with her MS but has been off of this since she last saw her neurologist.  He has been having a hard time getting a hold of her neurologist and setting up appointments.  She had one scheduled but it was rescheduled prior to her visit.  She is hoping she can switch to a new neurologist that will be easier to get into.    She continues to have difficulty getting in with her neurologist, no appointment scheduled so far       ROS:  ROS    Objective:     Exam:  /58   Pulse 82   Temp 36.6 °C (97.9 °F) (Temporal)   Resp 16   Ht 1.575 m (5' 2\")   Wt 39 kg (85 lb 15.7 oz)   SpO2 98%   BMI 15.73 kg/m²  Body mass index is 15.73 kg/m².    Physical Exam  Vitals reviewed.   Constitutional:       General: She is not in acute distress.     Appearance: She is not toxic-appearing.      Comments: Exam through observation only   HENT:      Head: Normocephalic and atraumatic.      Right Ear: External ear normal.      Left Ear: External ear normal.   Eyes:      General:         Right eye: No discharge.         Left eye: No discharge.      Extraocular Movements: Extraocular movements intact.      Conjunctiva/sclera: Conjunctivae normal.   Pulmonary:      Effort: Pulmonary effort is normal. No respiratory distress.   Skin:     General: Skin is warm and dry.   Neurological:      Mental Status: She is alert.   Psychiatric:         Mood and Affect: Mood " normal.         Behavior: Behavior normal.         Thought Content: Thought content normal.         Judgment: Judgment normal.           Assessment & Plan:     33 y.o. female with the following -     1. Encounter for initial prescription of injectable contraceptive  Negative urine pregnancy test, she has also had recent negative STD screening through [facility], Depo given today, follow-up in 3 months for repeat Depo  - POCT Pregnancy  - medroxyPROGESTERone (Depo-Provera) injection 150 mg    2. Multiple sclerosis (HCC)  We will assist patient by reaching out to the neurologist and seeing if we can set her up with an appointment.  It is very important that she sees them due to her MS.    No follow-ups on file.    Please note that this dictation was created using voice recognition software. I have made every reasonable attempt to correct obvious errors, but I expect that there are errors of grammar and possibly content that I did not discover before finalizing the note.

## 2023-09-12 DIAGNOSIS — G35 MULTIPLE SCLEROSIS (HCC): ICD-10-CM

## 2023-09-12 DIAGNOSIS — R11.2 NON-INTRACTABLE VOMITING WITH NAUSEA: ICD-10-CM

## 2023-09-12 RX ORDER — ONDANSETRON 4 MG/1
4 TABLET, ORALLY DISINTEGRATING ORAL EVERY 6 HOURS PRN
Qty: 30 TABLET | Refills: 0 | Status: SHIPPED | OUTPATIENT
Start: 2023-09-12 | End: 2024-02-09

## 2023-09-12 RX ORDER — BACLOFEN 10 MG/1
10 TABLET ORAL 3 TIMES DAILY
Qty: 90 TABLET | Refills: 5 | Status: SHIPPED | OUTPATIENT
Start: 2023-09-12 | End: 2024-02-09

## 2023-10-25 ENCOUNTER — APPOINTMENT (OUTPATIENT)
Dept: MEDICAL GROUP | Facility: MEDICAL CENTER | Age: 33
End: 2023-10-25
Payer: MEDICARE

## 2023-10-30 ENCOUNTER — OFFICE VISIT (OUTPATIENT)
Dept: MEDICAL GROUP | Facility: MEDICAL CENTER | Age: 33
End: 2023-10-30
Payer: MEDICARE

## 2023-10-30 ENCOUNTER — APPOINTMENT (OUTPATIENT)
Dept: MEDICAL GROUP | Facility: MEDICAL CENTER | Age: 33
End: 2023-10-30
Payer: MEDICARE

## 2023-10-30 VITALS
OXYGEN SATURATION: 100 % | DIASTOLIC BLOOD PRESSURE: 50 MMHG | SYSTOLIC BLOOD PRESSURE: 94 MMHG | HEART RATE: 92 BPM | WEIGHT: 93 LBS | RESPIRATION RATE: 16 BRPM | BODY MASS INDEX: 17.11 KG/M2 | HEIGHT: 62 IN | TEMPERATURE: 97.9 F

## 2023-10-30 DIAGNOSIS — F43.12 NIGHTMARES ASSOCIATED WITH CHRONIC POST-TRAUMATIC STRESS DISORDER: ICD-10-CM

## 2023-10-30 DIAGNOSIS — Z00.00 ANNUAL PHYSICAL EXAM: ICD-10-CM

## 2023-10-30 DIAGNOSIS — Z30.013 ENCOUNTER FOR INITIAL PRESCRIPTION OF INJECTABLE CONTRACEPTIVE: ICD-10-CM

## 2023-10-30 DIAGNOSIS — F51.5 NIGHTMARES ASSOCIATED WITH CHRONIC POST-TRAUMATIC STRESS DISORDER: ICD-10-CM

## 2023-10-30 DIAGNOSIS — F31.9 BIPOLAR 1 DISORDER (HCC): ICD-10-CM

## 2023-10-30 DIAGNOSIS — G35 MULTIPLE SCLEROSIS (HCC): ICD-10-CM

## 2023-10-30 PROCEDURE — 99214 OFFICE O/P EST MOD 30 MIN: CPT | Performed by: STUDENT IN AN ORGANIZED HEALTH CARE EDUCATION/TRAINING PROGRAM

## 2023-10-30 PROCEDURE — 3074F SYST BP LT 130 MM HG: CPT | Performed by: STUDENT IN AN ORGANIZED HEALTH CARE EDUCATION/TRAINING PROGRAM

## 2023-10-30 PROCEDURE — 3078F DIAST BP <80 MM HG: CPT | Performed by: STUDENT IN AN ORGANIZED HEALTH CARE EDUCATION/TRAINING PROGRAM

## 2023-10-30 RX ORDER — MEDROXYPROGESTERONE ACETATE 150 MG/ML
150 INJECTION, SUSPENSION INTRAMUSCULAR ONCE
Status: COMPLETED | OUTPATIENT
Start: 2023-10-30 | End: 2023-10-30

## 2023-10-30 RX ADMIN — MEDROXYPROGESTERONE ACETATE 150 MG: 150 INJECTION, SUSPENSION INTRAMUSCULAR at 15:04

## 2023-10-30 SDOH — ECONOMIC STABILITY: INCOME INSECURITY: IN THE LAST 12 MONTHS, WAS THERE A TIME WHEN YOU WERE NOT ABLE TO PAY THE MORTGAGE OR RENT ON TIME?: YES

## 2023-10-30 SDOH — ECONOMIC STABILITY: FOOD INSECURITY: WITHIN THE PAST 12 MONTHS, YOU WORRIED THAT YOUR FOOD WOULD RUN OUT BEFORE YOU GOT MONEY TO BUY MORE.: NEVER TRUE

## 2023-10-30 SDOH — HEALTH STABILITY: PHYSICAL HEALTH
ON AVERAGE, HOW MANY DAYS PER WEEK DO YOU ENGAGE IN MODERATE TO STRENUOUS EXERCISE (LIKE A BRISK WALK)?: PATIENT DECLINED

## 2023-10-30 SDOH — ECONOMIC STABILITY: HOUSING INSECURITY: IN THE LAST 12 MONTHS, HOW MANY PLACES HAVE YOU LIVED?: 2

## 2023-10-30 SDOH — HEALTH STABILITY: PHYSICAL HEALTH: ON AVERAGE, HOW MANY MINUTES DO YOU ENGAGE IN EXERCISE AT THIS LEVEL?: PATIENT DECLINED

## 2023-10-30 SDOH — ECONOMIC STABILITY: HOUSING INSECURITY
IN THE LAST 12 MONTHS, WAS THERE A TIME WHEN YOU DID NOT HAVE A STEADY PLACE TO SLEEP OR SLEPT IN A SHELTER (INCLUDING NOW)?: NO

## 2023-10-30 SDOH — ECONOMIC STABILITY: TRANSPORTATION INSECURITY
IN THE PAST 12 MONTHS, HAS LACK OF RELIABLE TRANSPORTATION KEPT YOU FROM MEDICAL APPOINTMENTS, MEETINGS, WORK OR FROM GETTING THINGS NEEDED FOR DAILY LIVING?: NO

## 2023-10-30 SDOH — ECONOMIC STABILITY: HOUSING INSECURITY
IN THE LAST 12 MONTHS, WAS THERE A TIME WHEN YOU DID NOT HAVE A STEADY PLACE TO SLEEP OR SLEPT IN A SHELTER (INCLUDING NOW)?: YES

## 2023-10-30 SDOH — ECONOMIC STABILITY: FOOD INSECURITY: WITHIN THE PAST 12 MONTHS, THE FOOD YOU BOUGHT JUST DIDN'T LAST AND YOU DIDN'T HAVE MONEY TO GET MORE.: SOMETIMES TRUE

## 2023-10-30 SDOH — ECONOMIC STABILITY: INCOME INSECURITY: HOW HARD IS IT FOR YOU TO PAY FOR THE VERY BASICS LIKE FOOD, HOUSING, MEDICAL CARE, AND HEATING?: SOMEWHAT HARD

## 2023-10-30 SDOH — HEALTH STABILITY: MENTAL HEALTH
STRESS IS WHEN SOMEONE FEELS TENSE, NERVOUS, ANXIOUS, OR CAN'T SLEEP AT NIGHT BECAUSE THEIR MIND IS TROUBLED. HOW STRESSED ARE YOU?: VERY MUCH

## 2023-10-30 ASSESSMENT — LIFESTYLE VARIABLES
HOW OFTEN DO YOU HAVE SIX OR MORE DRINKS ON ONE OCCASION: DAILY OR ALMOST DAILY
HOW MANY STANDARD DRINKS CONTAINING ALCOHOL DO YOU HAVE ON A TYPICAL DAY: 1 OR 2
AUDIT-C TOTAL SCORE: 7
SKIP TO QUESTIONS 9-10: 0
HOW OFTEN DO YOU HAVE A DRINK CONTAINING ALCOHOL: 2-3 TIMES A WEEK

## 2023-10-30 ASSESSMENT — SOCIAL DETERMINANTS OF HEALTH (SDOH)
ARE YOU MARRIED, WIDOWED, DIVORCED, SEPARATED, NEVER MARRIED, OR LIVING WITH A PARTNER?: LIVING WITH PARTNER
HOW OFTEN DO YOU ATTEND CHURCH OR RELIGIOUS SERVICES?: NEVER
HOW MANY DRINKS CONTAINING ALCOHOL DO YOU HAVE ON A TYPICAL DAY WHEN YOU ARE DRINKING: 1 OR 2
WITHIN THE PAST 12 MONTHS, YOU WORRIED THAT YOUR FOOD WOULD RUN OUT BEFORE YOU GOT THE MONEY TO BUY MORE: NEVER TRUE
HOW OFTEN DO YOU ATTEND CHURCH OR RELIGIOUS SERVICES?: NEVER
HOW HARD IS IT FOR YOU TO PAY FOR THE VERY BASICS LIKE FOOD, HOUSING, MEDICAL CARE, AND HEATING?: SOMEWHAT HARD
HOW OFTEN DO YOU ATTENT MEETINGS OF THE CLUB OR ORGANIZATION YOU BELONG TO?: NEVER
ARE YOU MARRIED, WIDOWED, DIVORCED, SEPARATED, NEVER MARRIED, OR LIVING WITH A PARTNER?: LIVING WITH PARTNER
HOW OFTEN DO YOU GET TOGETHER WITH FRIENDS OR RELATIVES?: PATIENT DECLINED
DO YOU BELONG TO ANY CLUBS OR ORGANIZATIONS SUCH AS CHURCH GROUPS UNIONS, FRATERNAL OR ATHLETIC GROUPS, OR SCHOOL GROUPS?: NO
HOW OFTEN DO YOU HAVE SIX OR MORE DRINKS ON ONE OCCASION: DAILY OR ALMOST DAILY
HOW OFTEN DO YOU HAVE A DRINK CONTAINING ALCOHOL: 2-3 TIMES A WEEK
HOW OFTEN DO YOU ATTENT MEETINGS OF THE CLUB OR ORGANIZATION YOU BELONG TO?: NEVER
IN A TYPICAL WEEK, HOW MANY TIMES DO YOU TALK ON THE PHONE WITH FAMILY, FRIENDS, OR NEIGHBORS?: ONCE A WEEK
DO YOU BELONG TO ANY CLUBS OR ORGANIZATIONS SUCH AS CHURCH GROUPS UNIONS, FRATERNAL OR ATHLETIC GROUPS, OR SCHOOL GROUPS?: NO
HOW OFTEN DO YOU GET TOGETHER WITH FRIENDS OR RELATIVES?: PATIENT DECLINED
IN A TYPICAL WEEK, HOW MANY TIMES DO YOU TALK ON THE PHONE WITH FAMILY, FRIENDS, OR NEIGHBORS?: ONCE A WEEK

## 2023-10-30 ASSESSMENT — FIBROSIS 4 INDEX: FIB4 SCORE: 0.79

## 2023-10-30 NOTE — PROGRESS NOTES
Subjective:     CC:   Chief Complaint   Patient presents with    Annual Exam       HPI:   Nanci Mccormack is a 33 y.o. female who presents for annual exam. She is feeling well and denies any complaints.    Ob-Gyn/ History:    Patient has GYN provider: no  /Para:    Last Pap Smear:  . No history of abnormal pap smears.  Gyn Surgery:  NA.  Current Contraceptive Method:  Depo-provera. She is currently sexually active.      Health Maintenance  Diet: Discussed   Exercise: Discussed   Substance Abuse: NA     Cancer screening  Colorectal Cancer Screening: NA    Lung Cancer Screening: NA    Cervical Cancer Screening: Up to date   Breast Cancer Screening: NA     Infectious disease screening/Immunizations  --Immunizations: Declines flu shot today    She  has a past medical history of MS (multiple sclerosis) (HCC).  She  has a past surgical history that includes cyst excision (Right); pr dental surgery procedure (2020); and submandible abscess incision and drainage (2020).    Family History   Problem Relation Age of Onset    Hypertension Mother     Cancer Mother         cervical    No Known Problems Sister     No Known Problems Sister     No Known Problems Brother     No Known Problems Brother     Cancer Maternal Aunt         Leiomyosarcoma    No Known Problems Daughter     No Known Problems Daughter     Ovarian Cancer Neg Hx     Tubal Cancer Neg Hx     Breast Cancer Neg Hx     Colorectal Cancer Neg Hx     Peritoneal Cancer Neg Hx        Social History     Socioeconomic History    Marital status: Single     Spouse name: Not on file    Number of children: Not on file    Years of education: Not on file    Highest education level: 12th grade   Occupational History    Not on file   Tobacco Use    Smoking status: Never    Smokeless tobacco: Never   Vaping Use    Vaping Use: Never used   Substance and Sexual Activity    Alcohol use: Yes     Comment: 3-5 times weekly    Drug use: Yes     Types: Inhaled,  Marijuana     Comment: daily marijuana    Sexual activity: Yes     Partners: Male   Other Topics Concern    Not on file   Social History Narrative    Not on file     Social Determinants of Health     Financial Resource Strain: Medium Risk (10/30/2023)    Overall Financial Resource Strain (CARDIA)     Difficulty of Paying Living Expenses: Somewhat hard   Food Insecurity: Food Insecurity Present (10/30/2023)    Hunger Vital Sign     Worried About Running Out of Food in the Last Year: Never true     Ran Out of Food in the Last Year: Sometimes true   Transportation Needs: No Transportation Needs (10/30/2023)    PRAPARE - Transportation     Lack of Transportation (Medical): No     Lack of Transportation (Non-Medical): No   Physical Activity: Unknown (10/30/2023)    Exercise Vital Sign     Days of Exercise per Week: Patient refused     Minutes of Exercise per Session: Patient refused   Stress: Stress Concern Present (10/30/2023)    [employer] - Occupational Stress Questionnaire     Feeling of Stress : Very much   Social Connections: Unknown (10/30/2023)    Social Connection and Isolation Panel [NHANES]     Frequency of Communication with Friends and Family: Once a week     Frequency of Social Gatherings with Friends and Family: Patient refused     Attends [employer] Services: Never     Active Member of Clubs or Organizations: No     Attends Club or Organization Meetings: Never     Marital Status: Living with partner   Intimate Partner Violence: Not on file   Housing Stability: High Risk (10/30/2023)    Housing Stability Vital Sign     Unable to Pay for Housing in the Last Year: Yes     Number of Places Lived in the Last Year: 2     Unstable Housing in the Last Year: No       Patient Active Problem List    Diagnosis Date Noted    Migraine without aura and without status migrainosus, not intractable 03/23/2023    Right wrist pain 03/23/2023    Bipolar 1 disorder (HCC) 01/05/2023    Encounter for  "surveillance of contraceptive pills 01/05/2023    Nightmares associated with chronic post-traumatic stress disorder 06/30/2020    Mood changes 05/28/2020    Spasticity 02/14/2018    Chronic low back pain without sciatica 02/14/2018    Severe episode of recurrent major depressive disorder, without psychotic features (HCC) 02/14/2018    Encounter for initial prescription of injectable contraceptive 10/12/2017    Marijuana use 10/12/2017    Multiple sclerosis (HCC) 10/04/2017    Chronic pain syndrome 10/04/2017         Current Outpatient Medications   Medication Sig Dispense Refill    baclofen (LIORESAL) 10 MG Tab Take 1 Tablet by mouth 3 times a day. 90 Tablet 5    ondansetron (ZOFRAN ODT) 4 MG TABLET DISPERSIBLE Take 1 Tablet by mouth every 6 hours as needed for Nausea/Vomiting. 30 Tablet 0    LamoTRIgine 50 MG TABLET SR 24 HR TAKE 1 TABLET BY MOUTH AT BEDTIME 90 Tablet 1    meloxicam (MOBIC) 15 MG tablet Take 1 Tablet by mouth every day. 15 Tablet 0    SUMAtriptan (IMITREX) 25 MG Tab tablet Take 1 Tablet by mouth one time as needed for Migraine for up to 1 dose. 10 Tablet 3    prazosin (MINIPRESS) 1 MG Cap Take 1 Capsule by mouth every evening. 30 Capsule 0     No current facility-administered medications for this visit.     Allergies   Allergen Reactions    Pcn [Penicillins]      Hives           Objective:     BP 94/50   Pulse 92   Temp 36.6 °C (97.9 °F) (Temporal)   Resp 16   Ht 1.575 m (5' 2\")   Wt 42.2 kg (93 lb)   SpO2 100%   BMI 17.01 kg/m²   Body mass index is 17.01 kg/m².  Wt Readings from Last 4 Encounters:   10/30/23 42.2 kg (93 lb)   07/31/23 39 kg (85 lb 15.7 oz)   05/22/23 40.2 kg (88 lb 10 oz)   05/08/23 43.1 kg (95 lb)       Physical Exam:  Constitutional: Well-developed and well-nourished. Not diaphoretic. No distress.   Skin: Skin is warm and dry. No rash noted.  Head: Atraumatic without lesions.  Eyes: Conjunctivae and extraocular motions are normal. Pupils are equal, round, and reactive to " light. No scleral icterus.   Ears:  External ears unremarkable. Tympanic membranes clear and intact.  Nose: Nares patent. Septum midline. Turbinates without erythema nor edema. No discharge.   Mouth/Throat: Dentition is normal. Tongue normal. Oropharynx is clear and moist. Posterior pharynx without erythema or exudates.  Neck: Supple, trachea midline. Normal range of motion.   Cardiovascular: Regular rate and rhythm, S1 and S2 without murmur, rubs, or gallops.  Lungs: Normal inspiratory effort, CTA bilaterally, no wheezes/rhonchi/rales  Abdomen: Soft, non tender, and without distention. Active bowel sounds in all four quadrants. No rebound, guarding, masses or HSM.  Extremities: No cyanosis, clubbing, erythema, nor edema.   Musculoskeletal: All major joints AROM full in all directions without pain.  Neurological: Alert and oriented x 3.   Psychiatric:  Behavior, mood, and affect are appropriate.    Assessment and Plan:     1. Annual physical exam        2. Encounter for initial prescription of injectable contraceptive  medroxyPROGESTERone (Depo-Provera) injection 150 mg      3. Multiple sclerosis (HCC)  Referral to Neurology      4. Bipolar 1 disorder (HCC)  Referral to Psychiatry      5. Nightmares associated with chronic post-traumatic stress disorder  Referral to Psychiatry        Discussed routine screenings and vaccinations, healthy diet and exercise, helmet use and seatbelt use.  Return in 1 year for annual.  New referral sent to psychiatry and neurology since she has been unable to get in with them.    Please note that this dictation was created using voice recognition software. I have made every reasonable attempt to correct obvious errors, but I expect that there are errors of grammar and possibly content that I did not discover before finalizing the note.      Follow-up: No follow-ups on file.

## 2023-10-31 ENCOUNTER — APPOINTMENT (OUTPATIENT)
Dept: MEDICAL GROUP | Facility: MEDICAL CENTER | Age: 33
End: 2023-10-31
Payer: MEDICARE

## 2024-02-08 ENCOUNTER — OFFICE VISIT (OUTPATIENT)
Dept: BEHAVIORAL HEALTH | Facility: CLINIC | Age: 34
End: 2024-02-08
Payer: MEDICARE

## 2024-02-08 DIAGNOSIS — F43.10 POSTTRAUMATIC STRESS DISORDER: ICD-10-CM

## 2024-02-08 DIAGNOSIS — R11.2 NON-INTRACTABLE VOMITING WITH NAUSEA: ICD-10-CM

## 2024-02-08 DIAGNOSIS — F90.2 ATTENTION DEFICIT HYPERACTIVITY DISORDER, COMBINED TYPE: ICD-10-CM

## 2024-02-08 DIAGNOSIS — F31.9 BIPOLAR 1 DISORDER (HCC): ICD-10-CM

## 2024-02-08 PROCEDURE — 90792 PSYCH DIAG EVAL W/MED SRVCS: CPT | Performed by: PSYCHIATRY & NEUROLOGY

## 2024-02-08 RX ORDER — OLANZAPINE 5 MG/1
5 TABLET ORAL NIGHTLY
Qty: 30 TABLET | Refills: 0 | Status: SHIPPED | OUTPATIENT
Start: 2024-02-08 | End: 2024-03-09

## 2024-02-08 NOTE — PROGRESS NOTES
[facility]   Therapy Progress Note      This provider informed the patient their medical records are totally confidential except for the use by other providers involved in their care, or if the patient signs a release, or to report instances of child or elder abuse, or if it is determined they are an immediate risk to harm themselves or others.    Name: Nanci Mccormack  MRN: [id number]  : 1990  Age: 34 y.o.  Date of assessment: 2024  PCP: Isabel Medrano M.D.      Present Illness:   Chart reviewed prior to seeing her in my office.  She is 34 years old, never , and has 2 daughters 12 and 16.  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  She grew up in [place] and then lived in [place] until coming to [place] 8 years ago.  He was diagnosed as having bipolar disorder 3 years ago.  She does experience abrupt mood swings for no particular reason.  She is at times quite talkative and has episodes of high energy with racing thoughts.  The episodes can last for hours or days.  Overspending has caused financial problems.  The depressive episodes can last for weeks.  She denies any grandiosity.  At times she variances auditory hallucinations primarily her children and her mother.  She has occasional visual hallucinations and gets paranoid about being followed..  She has wondered if she has ADHD.  She is restless, distractible, and impulsive.  She has trouble completing projects and struggled academically.  She has difficulty staying focused on movies and books.    Past Psych History:   She did see a psychologist and a psychiatrist for 2 years between age 12 and 14.  She has never been in a psychiatric hospital.    Substance Abuse History:  She is using marijuana daily but not at her place of employment.  She she is also drinking tequila fairly frequently.  Patient will information will need to be obtained.    Family History:   Her father is .  She has 2 sisters and  2 brothers.  Her maternal grandmother is on Prozac.    Medical History:  Mass was diagnosed 12 years ago she also has a problem with migraines.    Psych Medications:  He is currently on Lamictal 50 mg at bedtime for approximately 1 year.  She is taking prazosin 1 mg at bedtime nightmares for 1 year also.  Trazodone did not help her insomnia.  She has never been on Zyprexa, Seroquel, Abilify, Latuda, Vraylar, or Geodon.    Allergies:   Penicillin    Mental Status:   She is alert, oriented, and cooperative.  Relatedness is good.  Grooming is good.  Speech is rapid.  Quite anxious and tearful at times during the interview.  Memory is good.  Insight and judgment are fairly good.  Paranoid ideation noted.    Current Risk:       Suicidal: Not suicidal       Homicidal: Not homicidal       Self-Harm: No plan to harm self       Relapse (Low/Moderate/High): Moderate       Crisis Safety Plan Reviewed: Discussed with patient    Diagnosis:  Bipolar disorder, depressed  Rule out schizoaffective disorder  PTSD  ADHD    Treatment Plan:  The current treatment plan consists of a follow-up visit in 3 weeks and then monthly psychiatric sessions designed to evaluate her bipolar disorder, possible schizoaffective disorder, PTSD, and possible ADHD.    Duration will be for a minimum of 12 months and will be reviewed at each visit.    Goals: Embolization of moods and elimination of auditory and visual hallucinations with control of paranoid ideation and improvement in likely ADHD.  Continue Lamictal 50 mg at bedtime for now.  Start Zyprexa 5 mg at bedtime.  Possible side effects including increased appetite and drowsiness discussed.      Pranav Clay M.D.     This note was created using voice recognition software (Dragon). The accuracy of the dictation is limited by the abilities of the software. I have reviewed the note prior to signing, however some errors in grammar and context are still possible. If you have any questions related  to this note please do not hesitate to contact our office.

## 2024-02-09 ENCOUNTER — APPOINTMENT (OUTPATIENT)
Dept: RADIOLOGY | Facility: MEDICAL CENTER | Age: 34
End: 2024-02-09
Attending: EMERGENCY MEDICINE
Payer: MEDICARE

## 2024-02-09 ENCOUNTER — HOSPITAL ENCOUNTER (EMERGENCY)
Facility: MEDICAL CENTER | Age: 34
End: 2024-02-09
Attending: EMERGENCY MEDICINE
Payer: MEDICARE

## 2024-02-09 ENCOUNTER — HOSPITAL ENCOUNTER (OUTPATIENT)
Facility: MEDICAL CENTER | Age: 34
End: 2024-02-11
Attending: EMERGENCY MEDICINE | Admitting: STUDENT IN AN ORGANIZED HEALTH CARE EDUCATION/TRAINING PROGRAM
Payer: MEDICARE

## 2024-02-09 VITALS
DIASTOLIC BLOOD PRESSURE: 82 MMHG | WEIGHT: 90.61 LBS | HEART RATE: 71 BPM | SYSTOLIC BLOOD PRESSURE: 127 MMHG | BODY MASS INDEX: 16.67 KG/M2 | HEIGHT: 62 IN | RESPIRATION RATE: 15 BRPM | TEMPERATURE: 97.5 F | OXYGEN SATURATION: 95 %

## 2024-02-09 DIAGNOSIS — K02.9 PAIN DUE TO DENTAL CARIES: ICD-10-CM

## 2024-02-09 DIAGNOSIS — K04.7 DENTAL ABSCESS: ICD-10-CM

## 2024-02-09 PROBLEM — E87.20 METABOLIC ACIDOSIS: Status: ACTIVE | Noted: 2024-02-09

## 2024-02-09 PROBLEM — E87.1 HYPONATREMIA: Status: ACTIVE | Noted: 2024-02-09

## 2024-02-09 LAB
ALBUMIN SERPL BCP-MCNC: 4.2 G/DL (ref 3.2–4.9)
ALBUMIN/GLOB SERPL: 1.4 G/DL
ALP SERPL-CCNC: 77 U/L (ref 30–99)
ALT SERPL-CCNC: 6 U/L (ref 2–50)
ANION GAP SERPL CALC-SCNC: 14 MMOL/L (ref 7–16)
AST SERPL-CCNC: 19 U/L (ref 12–45)
BASOPHILS # BLD AUTO: 0.2 % (ref 0–1.8)
BASOPHILS # BLD: 0.03 K/UL (ref 0–0.12)
BILIRUB SERPL-MCNC: 0.5 MG/DL (ref 0.1–1.5)
BUN SERPL-MCNC: 7 MG/DL (ref 8–22)
CALCIUM ALBUM COR SERPL-MCNC: 9.1 MG/DL (ref 8.5–10.5)
CALCIUM SERPL-MCNC: 9.3 MG/DL (ref 8.5–10.5)
CHLORIDE SERPL-SCNC: 103 MMOL/L (ref 96–112)
CO2 SERPL-SCNC: 17 MMOL/L (ref 20–33)
CREAT SERPL-MCNC: 0.53 MG/DL (ref 0.5–1.4)
EOSINOPHIL # BLD AUTO: 0.05 K/UL (ref 0–0.51)
EOSINOPHIL NFR BLD: 0.4 % (ref 0–6.9)
ERYTHROCYTE [DISTWIDTH] IN BLOOD BY AUTOMATED COUNT: 47.1 FL (ref 35.9–50)
GFR SERPLBLD CREATININE-BSD FMLA CKD-EPI: 124 ML/MIN/1.73 M 2
GLOBULIN SER CALC-MCNC: 3.1 G/DL (ref 1.9–3.5)
GLUCOSE SERPL-MCNC: 76 MG/DL (ref 65–99)
HCG SERPL QL: NEGATIVE
HCT VFR BLD AUTO: 43.1 % (ref 37–47)
HGB BLD-MCNC: 13.6 G/DL (ref 12–16)
IMM GRANULOCYTES # BLD AUTO: 0.05 K/UL (ref 0–0.11)
IMM GRANULOCYTES NFR BLD AUTO: 0.4 % (ref 0–0.9)
LYMPHOCYTES # BLD AUTO: 1.79 K/UL (ref 1–4.8)
LYMPHOCYTES NFR BLD: 12.9 % (ref 22–41)
MCH RBC QN AUTO: 31.6 PG (ref 27–33)
MCHC RBC AUTO-ENTMCNC: 31.6 G/DL (ref 32.2–35.5)
MCV RBC AUTO: 100 FL (ref 81.4–97.8)
MONOCYTES # BLD AUTO: 0.56 K/UL (ref 0–0.85)
MONOCYTES NFR BLD AUTO: 4 % (ref 0–13.4)
NEUTROPHILS # BLD AUTO: 11.42 K/UL (ref 1.82–7.42)
NEUTROPHILS NFR BLD: 82.1 % (ref 44–72)
NRBC # BLD AUTO: 0 K/UL
NRBC BLD-RTO: 0 /100 WBC (ref 0–0.2)
PLATELET # BLD AUTO: 321 K/UL (ref 164–446)
PMV BLD AUTO: 9 FL (ref 9–12.9)
POTASSIUM SERPL-SCNC: 4.9 MMOL/L (ref 3.6–5.5)
PROT SERPL-MCNC: 7.3 G/DL (ref 6–8.2)
RBC # BLD AUTO: 4.31 M/UL (ref 4.2–5.4)
SODIUM SERPL-SCNC: 134 MMOL/L (ref 135–145)
WBC # BLD AUTO: 13.9 K/UL (ref 4.8–10.8)

## 2024-02-09 PROCEDURE — A9270 NON-COVERED ITEM OR SERVICE: HCPCS | Mod: UD | Performed by: EMERGENCY MEDICINE

## 2024-02-09 PROCEDURE — 96365 THER/PROPH/DIAG IV INF INIT: CPT

## 2024-02-09 PROCEDURE — 700102 HCHG RX REV CODE 250 W/ 637 OVERRIDE(OP): Mod: UD | Performed by: INTERNAL MEDICINE

## 2024-02-09 PROCEDURE — 80053 COMPREHEN METABOLIC PANEL: CPT

## 2024-02-09 PROCEDURE — 96375 TX/PRO/DX INJ NEW DRUG ADDON: CPT

## 2024-02-09 PROCEDURE — A9270 NON-COVERED ITEM OR SERVICE: HCPCS | Mod: UD | Performed by: INTERNAL MEDICINE

## 2024-02-09 PROCEDURE — 700102 HCHG RX REV CODE 250 W/ 637 OVERRIDE(OP): Mod: UD | Performed by: EMERGENCY MEDICINE

## 2024-02-09 PROCEDURE — 36415 COLL VENOUS BLD VENIPUNCTURE: CPT

## 2024-02-09 PROCEDURE — 700117 HCHG RX CONTRAST REV CODE 255: Performed by: EMERGENCY MEDICINE

## 2024-02-09 PROCEDURE — 96376 TX/PRO/DX INJ SAME DRUG ADON: CPT

## 2024-02-09 PROCEDURE — 70355 PANORAMIC X-RAY OF JAWS: CPT

## 2024-02-09 PROCEDURE — 85025 COMPLETE CBC W/AUTO DIFF WBC: CPT

## 2024-02-09 PROCEDURE — 84703 CHORIONIC GONADOTROPIN ASSAY: CPT

## 2024-02-09 PROCEDURE — 700111 HCHG RX REV CODE 636 W/ 250 OVERRIDE (IP): Mod: UD | Performed by: INTERNAL MEDICINE

## 2024-02-09 PROCEDURE — G0378 HOSPITAL OBSERVATION PER HR: HCPCS

## 2024-02-09 PROCEDURE — 70487 CT MAXILLOFACIAL W/DYE: CPT

## 2024-02-09 PROCEDURE — 99285 EMERGENCY DEPT VISIT HI MDM: CPT

## 2024-02-09 PROCEDURE — 99283 EMERGENCY DEPT VISIT LOW MDM: CPT

## 2024-02-09 PROCEDURE — 700111 HCHG RX REV CODE 636 W/ 250 OVERRIDE (IP): Mod: JG,UD | Performed by: EMERGENCY MEDICINE

## 2024-02-09 PROCEDURE — 96367 TX/PROPH/DG ADDL SEQ IV INF: CPT

## 2024-02-09 PROCEDURE — 99223 1ST HOSP IP/OBS HIGH 75: CPT | Mod: GC | Performed by: STUDENT IN AN ORGANIZED HEALTH CARE EDUCATION/TRAINING PROGRAM

## 2024-02-09 RX ORDER — MORPHINE SULFATE 4 MG/ML
4 INJECTION INTRAVENOUS ONCE
Status: COMPLETED | OUTPATIENT
Start: 2024-02-09 | End: 2024-02-09

## 2024-02-09 RX ORDER — MORPHINE SULFATE 4 MG/ML
2 INJECTION INTRAVENOUS
Status: DISCONTINUED | OUTPATIENT
Start: 2024-02-09 | End: 2024-02-11 | Stop reason: HOSPADM

## 2024-02-09 RX ORDER — OLANZAPINE 5 MG/1
5 TABLET ORAL NIGHTLY
Status: DISCONTINUED | OUTPATIENT
Start: 2024-02-09 | End: 2024-02-11 | Stop reason: HOSPADM

## 2024-02-09 RX ORDER — CLINDAMYCIN PHOSPHATE 600 MG/50ML
600 INJECTION, SOLUTION INTRAVENOUS ONCE
Status: COMPLETED | OUTPATIENT
Start: 2024-02-09 | End: 2024-02-09

## 2024-02-09 RX ORDER — IBUPROFEN 200 MG
800 TABLET ORAL
COMMUNITY

## 2024-02-09 RX ORDER — AMOXICILLIN 250 MG
1 CAPSULE ORAL EVERY EVENING
Status: DISCONTINUED | OUTPATIENT
Start: 2024-02-09 | End: 2024-02-11 | Stop reason: HOSPADM

## 2024-02-09 RX ORDER — OXYCODONE HYDROCHLORIDE 5 MG/1
5 TABLET ORAL
Status: DISCONTINUED | OUTPATIENT
Start: 2024-02-09 | End: 2024-02-11 | Stop reason: HOSPADM

## 2024-02-09 RX ORDER — METRONIDAZOLE 500 MG/100ML
500 INJECTION, SOLUTION INTRAVENOUS EVERY 12 HOURS
Status: DISCONTINUED | OUTPATIENT
Start: 2024-02-09 | End: 2024-02-11 | Stop reason: HOSPADM

## 2024-02-09 RX ORDER — KETOROLAC TROMETHAMINE 15 MG/ML
15 INJECTION, SOLUTION INTRAMUSCULAR; INTRAVENOUS EVERY 6 HOURS
Status: DISPENSED | OUTPATIENT
Start: 2024-02-10 | End: 2024-02-10

## 2024-02-09 RX ORDER — ONDANSETRON 4 MG/1
TABLET, ORALLY DISINTEGRATING ORAL
Qty: 30 TABLET | Refills: 0 | Status: SHIPPED | OUTPATIENT
Start: 2024-02-09

## 2024-02-09 RX ORDER — BACLOFEN 10 MG/1
10 TABLET ORAL
COMMUNITY

## 2024-02-09 RX ORDER — CLINDAMYCIN HYDROCHLORIDE 150 MG/1
300 CAPSULE ORAL ONCE
Status: COMPLETED | OUTPATIENT
Start: 2024-02-09 | End: 2024-02-09

## 2024-02-09 RX ORDER — OXYCODONE HYDROCHLORIDE 5 MG/1
2.5 TABLET ORAL
Status: DISCONTINUED | OUTPATIENT
Start: 2024-02-09 | End: 2024-02-11 | Stop reason: HOSPADM

## 2024-02-09 RX ORDER — ACETAMINOPHEN 500 MG
1000 TABLET ORAL EVERY 6 HOURS PRN
Status: DISCONTINUED | OUTPATIENT
Start: 2024-02-15 | End: 2024-02-11 | Stop reason: HOSPADM

## 2024-02-09 RX ORDER — POLYETHYLENE GLYCOL 3350 17 G/17G
1 POWDER, FOR SOLUTION ORAL
Status: DISCONTINUED | OUTPATIENT
Start: 2024-02-09 | End: 2024-02-11 | Stop reason: HOSPADM

## 2024-02-09 RX ORDER — ACETAMINOPHEN 500 MG
1000 TABLET ORAL EVERY 6 HOURS
Status: DISCONTINUED | OUTPATIENT
Start: 2024-02-10 | End: 2024-02-11 | Stop reason: HOSPADM

## 2024-02-09 RX ORDER — LAMOTRIGINE 50 MG/1
1 TABLET, EXTENDED RELEASE ORAL
Status: DISCONTINUED | OUTPATIENT
Start: 2024-02-09 | End: 2024-02-09

## 2024-02-09 RX ORDER — PRAZOSIN HYDROCHLORIDE 1 MG/1
1 CAPSULE ORAL NIGHTLY
Status: DISCONTINUED | OUTPATIENT
Start: 2024-02-09 | End: 2024-02-11 | Stop reason: HOSPADM

## 2024-02-09 RX ORDER — ONDANSETRON 2 MG/ML
4 INJECTION INTRAMUSCULAR; INTRAVENOUS ONCE
Status: COMPLETED | OUTPATIENT
Start: 2024-02-09 | End: 2024-02-09

## 2024-02-09 RX ORDER — CLINDAMYCIN HYDROCHLORIDE 300 MG/1
300 CAPSULE ORAL 3 TIMES DAILY
Qty: 21 CAPSULE | Refills: 0 | Status: ON HOLD | OUTPATIENT
Start: 2024-02-09 | End: 2024-02-11

## 2024-02-09 RX ORDER — HYDROCODONE BITARTRATE AND ACETAMINOPHEN 5; 325 MG/1; MG/1
1 TABLET ORAL ONCE
Status: COMPLETED | OUTPATIENT
Start: 2024-02-09 | End: 2024-02-09

## 2024-02-09 RX ORDER — MORPHINE SULFATE 4 MG/ML
4 INJECTION INTRAVENOUS
Status: DISCONTINUED | OUTPATIENT
Start: 2024-02-09 | End: 2024-02-09

## 2024-02-09 RX ORDER — LAMOTRIGINE 25 MG/1
25 TABLET ORAL 2 TIMES DAILY
Status: DISCONTINUED | OUTPATIENT
Start: 2024-02-09 | End: 2024-02-11 | Stop reason: HOSPADM

## 2024-02-09 RX ADMIN — HYDROCODONE BITARTRATE AND ACETAMINOPHEN 1 TABLET: 5; 325 TABLET ORAL at 05:45

## 2024-02-09 RX ADMIN — OXYCODONE 5 MG: 5 TABLET ORAL at 22:07

## 2024-02-09 RX ADMIN — CLINDAMYCIN HYDROCHLORIDE 300 MG: 150 CAPSULE ORAL at 06:14

## 2024-02-09 RX ADMIN — MORPHINE SULFATE 4 MG: 4 INJECTION, SOLUTION INTRAMUSCULAR; INTRAVENOUS at 18:45

## 2024-02-09 RX ADMIN — CLINDAMYCIN PHOSPHATE 600 MG: 600 INJECTION, SOLUTION INTRAVENOUS at 17:40

## 2024-02-09 RX ADMIN — MORPHINE SULFATE 4 MG: 4 INJECTION, SOLUTION INTRAMUSCULAR; INTRAVENOUS at 17:34

## 2024-02-09 RX ADMIN — METRONIDAZOLE 500 MG: 500 INJECTION, SOLUTION INTRAVENOUS at 23:13

## 2024-02-09 RX ADMIN — IOHEXOL 70 ML: 350 INJECTION, SOLUTION INTRAVENOUS at 19:10

## 2024-02-09 RX ADMIN — PRAZOSIN HYDROCHLORIDE 1 MG: 1 CAPSULE ORAL at 22:23

## 2024-02-09 RX ADMIN — LAMOTRIGINE 25 MG: 25 TABLET ORAL at 22:23

## 2024-02-09 RX ADMIN — MORPHINE SULFATE 2 MG: 4 INJECTION, SOLUTION INTRAMUSCULAR; INTRAVENOUS at 23:52

## 2024-02-09 RX ADMIN — ACETAMINOPHEN 1000 MG: 500 TABLET ORAL at 23:09

## 2024-02-09 RX ADMIN — ONDANSETRON 4 MG: 2 INJECTION INTRAMUSCULAR; INTRAVENOUS at 18:46

## 2024-02-09 RX ADMIN — ONDANSETRON 4 MG: 2 INJECTION INTRAMUSCULAR; INTRAVENOUS at 17:34

## 2024-02-09 RX ADMIN — KETOROLAC TROMETHAMINE 15 MG: 15 INJECTION, SOLUTION INTRAMUSCULAR; INTRAVENOUS at 23:09

## 2024-02-09 RX ADMIN — OLANZAPINE 5 MG: 5 TABLET, FILM COATED ORAL at 22:23

## 2024-02-09 ASSESSMENT — PATIENT HEALTH QUESTIONNAIRE - PHQ9
1. LITTLE INTEREST OR PLEASURE IN DOING THINGS: SEVERAL DAYS
5. POOR APPETITE OR OVEREATING: SEVERAL DAYS
SUM OF ALL RESPONSES TO PHQ9 QUESTIONS 1 AND 2: 2
2. FEELING DOWN, DEPRESSED, IRRITABLE, OR HOPELESS: SEVERAL DAYS
7. TROUBLE CONCENTRATING ON THINGS, SUCH AS READING THE NEWSPAPER OR WATCHING TELEVISION: NOT AT ALL
6. FEELING BAD ABOUT YOURSELF - OR THAT YOU ARE A FAILURE OR HAVE LET YOURSELF OR YOUR FAMILY DOWN: NOT AL ALL
9. THOUGHTS THAT YOU WOULD BE BETTER OFF DEAD, OR OF HURTING YOURSELF: NOT AT ALL
SUM OF ALL RESPONSES TO PHQ QUESTIONS 1-9: 5
3. TROUBLE FALLING OR STAYING ASLEEP OR SLEEPING TOO MUCH: MORE THAN HALF THE DAYS
4. FEELING TIRED OR HAVING LITTLE ENERGY: NOT AT ALL
8. MOVING OR SPEAKING SO SLOWLY THAT OTHER PEOPLE COULD HAVE NOTICED. OR THE OPPOSITE, BEING SO FIGETY OR RESTLESS THAT YOU HAVE BEEN MOVING AROUND A LOT MORE THAN USUAL: NOT AT ALL

## 2024-02-09 ASSESSMENT — ENCOUNTER SYMPTOMS
PALPITATIONS: 0
MYALGIAS: 0
DIZZINESS: 0
SPUTUM PRODUCTION: 0
COUGH: 0
NERVOUS/ANXIOUS: 0
NAUSEA: 0
BLURRED VISION: 0
FEVER: 1
HEMOPTYSIS: 0
HEADACHES: 0
VOMITING: 0
PHOTOPHOBIA: 0
NECK PAIN: 0
CHILLS: 1
HEARTBURN: 0

## 2024-02-09 ASSESSMENT — PAIN DESCRIPTION - PAIN TYPE
TYPE: ACUTE PAIN

## 2024-02-09 ASSESSMENT — LIFESTYLE VARIABLES
HAVE YOU EVER FELT YOU SHOULD CUT DOWN ON YOUR DRINKING: NO
ON A TYPICAL DAY WHEN YOU DRINK ALCOHOL HOW MANY DRINKS DO YOU HAVE: 2
HAVE PEOPLE ANNOYED YOU BY CRITICIZING YOUR DRINKING: NO
TOTAL SCORE: 0
TOTAL SCORE: 0
EVER HAD A DRINK FIRST THING IN THE MORNING TO STEADY YOUR NERVES TO GET RID OF A HANGOVER: NO
ALCOHOL_USE: YES
EVER FELT BAD OR GUILTY ABOUT YOUR DRINKING: NO
AVERAGE NUMBER OF DAYS PER WEEK YOU HAVE A DRINK CONTAINING ALCOHOL: 5
TOTAL SCORE: 0
DO YOU DRINK ALCOHOL: NO
CONSUMPTION TOTAL: POSITIVE
DOES PATIENT WANT TO STOP DRINKING: NO
HOW MANY TIMES IN THE PAST YEAR HAVE YOU HAD 5 OR MORE DRINKS IN A DAY: 0

## 2024-02-09 ASSESSMENT — FIBROSIS 4 INDEX
FIB4 SCORE: 0.81
FIB4 SCORE: 0.81
FIB4 SCORE: 0.82

## 2024-02-09 NOTE — ED PROVIDER NOTES
ED Provider Note    CHIEF COMPLAINT  Chief Complaint   Patient presents with    Tooth Ache     Right lower tooth pain for 4 days. Taking advils and tylenol without relief. Last intake at 2330. Reports of fever at home.        EXTERNAL RECORDS REVIEWED  Patient's last encounter was in the psychiatry clinic just yesterday history of bipolar disorder and depression, as well as PTSD and attention deficit hyperactivity disorder.  She was placed on olanzapine 5 mg, this was a medication change.    Prior to that patient was seen in the family medicine clinic on October of last year for an annual exam.  History of MS, nightmares, bipolar disorder she was referred to neurology and psychiatry.    HPI/ROS  LIMITATION TO HISTORY   Select: : None  OUTSIDE HISTORIAN(S):  None    Nanci Mccormack is a 34 y.o. female who presents to the emergency department with a chief complaint of right lower tooth pain.  Patient reports ongoing problem with dental issues.  She last was on antibiotics the end of December for similar symptoms.  She denies a fever.  No facial swelling.  No swelling to the floor of her mouth.  She is managing her own secretions.  Unfortunately, she does not have dental coverage associated with her Medicaid so it has been difficult for her to get into see a dentist.      PAST MEDICAL HISTORY   has a past medical history of MS (multiple sclerosis) (HCC).    SURGICAL HISTORY   has a past surgical history that includes cyst excision (Right); dental surgery procedure (1/1/2020); and submandible abscess incision and drainage (1/1/2020).    FAMILY HISTORY  Family History   Problem Relation Age of Onset    Hypertension Mother     Cancer Mother         cervical    No Known Problems Sister     No Known Problems Sister     No Known Problems Brother     No Known Problems Brother     Cancer Maternal Aunt         Leiomyosarcoma    No Known Problems Daughter     No Known Problems Daughter     Ovarian Cancer Neg Hx     Tubal  "Cancer Neg Hx     Breast Cancer Neg Hx     Colorectal Cancer Neg Hx     Peritoneal Cancer Neg Hx        SOCIAL HISTORY  Social History     Tobacco Use    Smoking status: Never    Smokeless tobacco: Never   Vaping Use    Vaping Use: Never used   Substance and Sexual Activity    Alcohol use: Yes     Comment: 3-5 times weekly    Drug use: Yes     Types: Inhaled, Marijuana     Comment: daily marijuana    Sexual activity: Yes     Partners: Male       CURRENT MEDICATIONS  Home Medications       Reviewed by Yomaira Hay R.N. (Registered Nurse) on 02/09/24 at 0445  Med List Status: Partial     Medication Last Dose Status   baclofen (LIORESAL) 10 MG Tab  Active   LamoTRIgine 50 MG TABLET SR 24 HR  Active   meloxicam (MOBIC) 15 MG tablet  Active   OLANZapine (ZYPREXA) 5 MG Tab  Active   ondansetron (ZOFRAN ODT) 4 MG TABLET DISPERSIBLE  Active   prazosin (MINIPRESS) 1 MG Cap  Active   SUMAtriptan (IMITREX) 25 MG Tab tablet  Active                    ALLERGIES  Allergies   Allergen Reactions    Pcn [Penicillins]      Hives         PHYSICAL EXAM  VITAL SIGNS: /81   Pulse 77   Temp 36.7 °C (98 °F) (Temporal)   Resp 16   Ht 1.575 m (5' 2\")   Wt 41.1 kg (90 lb 9.7 oz)   LMP  (LMP Unknown) Comment: on birth control  SpO2 97%   BMI 16.57 kg/m²    Vitals reviewed.  Constitutional: Patient is oriented to person, place, and time. Appears well-developed and well-nourished. Mild distress.    Head: Normocephalic and atraumatic.   Mouth/Throat: Oropharynx is clear and moist, no exudates.  Multiple dental caries.  No swelling to the floor of the mouth.  Managing her own secretions.  There is no gum swelling surrounding the tooth in question, right lower.  No obvious dental abscess amenable to drainage.  Eyes: Conjunctivae are normal. Pupils are equal, round, and reactive to light.   Neck: Normal range of motion.   Cardiovascular: Normal rate, regular rhythm and normal heart sounds.   Pulmonary/Chest: Effort normal and breath " sounds normal. No respiratory distress  Musculoskeletal: No edema and no tenderness.   Lymphadenopathy: No cervical adenopathy.   Neurological: No cranial nerve deficits. Normal gait no focal deficits.   Skin: Skin is warm and dry. No erythema. No pallor.   Psychiatric: Patient has a normal mood and affect.     DIAGNOSTIC STUDIES / PROCEDURES    RADIOLOGY    COURSE & MEDICAL DECISION MAKING    ED Observation Status? No; Patient does not meet criteria for ED Observation.     INITIAL ASSESSMENT, COURSE AND PLAN  Care Narrative:     This is a 34-year-old female.  She comes presents with lower right dental pain.  Dental pain, dental caries have been an ongoing problem for her.  I see no area that would be amenable to drainage at this time.  She has no facial swelling.  No difficulty swallowing.  No clinical evidence of Rick's angina.  I do think she would benefit from antibiotics.  She will be given a dose of pain medication here but I have advised against, a prescription for opioid pain medication at home.  Patient is agreeable to this plan of care.  She will continue to seek dental care as an outpatient and is given a dental referral page.  She is overall well-appearing, nontoxic.  She is discharged home in stable condition.    DISPOSITION AND DISCUSSIONS  I have discussed management of the patient with the following physicians and STEVE's:  None    Discussion of management with other QHP or appropriate source(s): None     Escalation of care considered, and ultimately not performed: None    Barriers to care at this time, including but not limited to: Patient does not have insurance, Patient had difficult affording medications, and Patient lacks financial resources.     Decision tools and prescription drugs considered including, but not limited to:  None    FINAL DIAGNOSIS  1. Pain due to dental caries         Electronically signed by: Isabel Calderon D.O., 2/9/2024 5:42 AM

## 2024-02-09 NOTE — ED TRIAGE NOTES
Nanci Ofelia Rothilla  34 y.o. female  Chief Complaint   Patient presents with    Tooth Ache     Right lower tooth pain for 4 days. Taking advils and tylenol without relief. Last intake at 2330. Reports of fever at home.      Pt ambulatory to triage with steady gait for above complaint.     Pt is GCS 15, speaking in full sentences, follows commands and responds appropriately to questions. Resp are even and unlabored.     Pt placed in ED lobby. Pt educated on triage process. Pt encouraged to alert staff for any changes.       Vitals:    02/09/24 0432   BP: (!) 122/92   Pulse: 80   Resp: 16   Temp: 35.9 °C (96.7 °F)   SpO2: 98%

## 2024-02-10 ENCOUNTER — ANESTHESIA (OUTPATIENT)
Dept: SURGERY | Facility: MEDICAL CENTER | Age: 34
End: 2024-02-10
Payer: MEDICARE

## 2024-02-10 ENCOUNTER — ANESTHESIA EVENT (OUTPATIENT)
Dept: SURGERY | Facility: MEDICAL CENTER | Age: 34
End: 2024-02-10
Payer: MEDICARE

## 2024-02-10 LAB
ALBUMIN SERPL BCP-MCNC: 3.8 G/DL (ref 3.2–4.9)
ALBUMIN/GLOB SERPL: 1.5 G/DL
ALP SERPL-CCNC: 70 U/L (ref 30–99)
ALT SERPL-CCNC: 10 U/L (ref 2–50)
ANION GAP SERPL CALC-SCNC: 12 MMOL/L (ref 7–16)
AST SERPL-CCNC: 11 U/L (ref 12–45)
BASOPHILS # BLD AUTO: 0.4 % (ref 0–1.8)
BASOPHILS # BLD: 0.03 K/UL (ref 0–0.12)
BILIRUB SERPL-MCNC: 0.3 MG/DL (ref 0.1–1.5)
BUN SERPL-MCNC: 8 MG/DL (ref 8–22)
CALCIUM ALBUM COR SERPL-MCNC: 8.7 MG/DL (ref 8.5–10.5)
CALCIUM SERPL-MCNC: 8.5 MG/DL (ref 8.5–10.5)
CHLORIDE SERPL-SCNC: 103 MMOL/L (ref 96–112)
CO2 SERPL-SCNC: 19 MMOL/L (ref 20–33)
CREAT SERPL-MCNC: 0.6 MG/DL (ref 0.5–1.4)
EOSINOPHIL # BLD AUTO: 0.18 K/UL (ref 0–0.51)
EOSINOPHIL NFR BLD: 2.3 % (ref 0–6.9)
ERYTHROCYTE [DISTWIDTH] IN BLOOD BY AUTOMATED COUNT: 43.5 FL (ref 35.9–50)
GFR SERPLBLD CREATININE-BSD FMLA CKD-EPI: 121 ML/MIN/1.73 M 2
GLOBULIN SER CALC-MCNC: 2.6 G/DL (ref 1.9–3.5)
GLUCOSE SERPL-MCNC: 102 MG/DL (ref 65–99)
HCT VFR BLD AUTO: 36.6 % (ref 37–47)
HGB BLD-MCNC: 12.5 G/DL (ref 12–16)
IMM GRANULOCYTES # BLD AUTO: 0.02 K/UL (ref 0–0.11)
IMM GRANULOCYTES NFR BLD AUTO: 0.3 % (ref 0–0.9)
LYMPHOCYTES # BLD AUTO: 2.48 K/UL (ref 1–4.8)
LYMPHOCYTES NFR BLD: 31.2 % (ref 22–41)
MCH RBC QN AUTO: 31.9 PG (ref 27–33)
MCHC RBC AUTO-ENTMCNC: 34.2 G/DL (ref 32.2–35.5)
MCV RBC AUTO: 93.4 FL (ref 81.4–97.8)
MONOCYTES # BLD AUTO: 0.62 K/UL (ref 0–0.85)
MONOCYTES NFR BLD AUTO: 7.8 % (ref 0–13.4)
NEUTROPHILS # BLD AUTO: 4.62 K/UL (ref 1.82–7.42)
NEUTROPHILS NFR BLD: 58 % (ref 44–72)
NRBC # BLD AUTO: 0 K/UL
NRBC BLD-RTO: 0 /100 WBC (ref 0–0.2)
PLATELET # BLD AUTO: 283 K/UL (ref 164–446)
PMV BLD AUTO: 8.9 FL (ref 9–12.9)
POTASSIUM SERPL-SCNC: 3.9 MMOL/L (ref 3.6–5.5)
PROT SERPL-MCNC: 6.4 G/DL (ref 6–8.2)
RBC # BLD AUTO: 3.92 M/UL (ref 4.2–5.4)
SODIUM SERPL-SCNC: 134 MMOL/L (ref 135–145)
WBC # BLD AUTO: 8 K/UL (ref 4.8–10.8)

## 2024-02-10 PROCEDURE — 700101 HCHG RX REV CODE 250: Mod: UD | Performed by: ANESTHESIOLOGY

## 2024-02-10 PROCEDURE — 700111 HCHG RX REV CODE 636 W/ 250 OVERRIDE (IP): Mod: UD | Performed by: INTERNAL MEDICINE

## 2024-02-10 PROCEDURE — 700101 HCHG RX REV CODE 250: Mod: UD | Performed by: DENTIST

## 2024-02-10 PROCEDURE — A9270 NON-COVERED ITEM OR SERVICE: HCPCS | Mod: UD | Performed by: ANESTHESIOLOGY

## 2024-02-10 PROCEDURE — 700111 HCHG RX REV CODE 636 W/ 250 OVERRIDE (IP): Mod: JZ,UD | Performed by: ANESTHESIOLOGY

## 2024-02-10 PROCEDURE — 700105 HCHG RX REV CODE 258: Mod: UD | Performed by: ANESTHESIOLOGY

## 2024-02-10 PROCEDURE — 700111 HCHG RX REV CODE 636 W/ 250 OVERRIDE (IP): Mod: JZ,UD

## 2024-02-10 PROCEDURE — A9270 NON-COVERED ITEM OR SERVICE: HCPCS | Mod: UD | Performed by: INTERNAL MEDICINE

## 2024-02-10 PROCEDURE — 96375 TX/PRO/DX INJ NEW DRUG ADDON: CPT

## 2024-02-10 PROCEDURE — 160038 HCHG SURGERY MINUTES - EA ADDL 1 MIN LEVEL 2: Performed by: DENTIST

## 2024-02-10 PROCEDURE — 160035 HCHG PACU - 1ST 60 MINS PHASE I: Performed by: DENTIST

## 2024-02-10 PROCEDURE — 99232 SBSQ HOSP IP/OBS MODERATE 35: CPT | Performed by: STUDENT IN AN ORGANIZED HEALTH CARE EDUCATION/TRAINING PROGRAM

## 2024-02-10 PROCEDURE — 700111 HCHG RX REV CODE 636 W/ 250 OVERRIDE (IP): Mod: UD | Performed by: ANESTHESIOLOGY

## 2024-02-10 PROCEDURE — 85025 COMPLETE CBC W/AUTO DIFF WBC: CPT

## 2024-02-10 PROCEDURE — 96376 TX/PRO/DX INJ SAME DRUG ADON: CPT

## 2024-02-10 PROCEDURE — 160009 HCHG ANES TIME/MIN: Performed by: DENTIST

## 2024-02-10 PROCEDURE — 80053 COMPREHEN METABOLIC PANEL: CPT

## 2024-02-10 PROCEDURE — 160027 HCHG SURGERY MINUTES - 1ST 30 MINS LEVEL 2: Performed by: DENTIST

## 2024-02-10 PROCEDURE — 96366 THER/PROPH/DIAG IV INF ADDON: CPT

## 2024-02-10 PROCEDURE — 700102 HCHG RX REV CODE 250 W/ 637 OVERRIDE(OP): Mod: UD | Performed by: INTERNAL MEDICINE

## 2024-02-10 PROCEDURE — 700102 HCHG RX REV CODE 250 W/ 637 OVERRIDE(OP): Mod: UD | Performed by: ANESTHESIOLOGY

## 2024-02-10 PROCEDURE — 160002 HCHG RECOVERY MINUTES (STAT): Performed by: DENTIST

## 2024-02-10 PROCEDURE — 160048 HCHG OR STATISTICAL LEVEL 1-5: Performed by: DENTIST

## 2024-02-10 PROCEDURE — 700101 HCHG RX REV CODE 250: Mod: UD | Performed by: INTERNAL MEDICINE

## 2024-02-10 PROCEDURE — G0378 HOSPITAL OBSERVATION PER HR: HCPCS

## 2024-02-10 RX ORDER — HYDROMORPHONE HYDROCHLORIDE 1 MG/ML
0.1 INJECTION, SOLUTION INTRAMUSCULAR; INTRAVENOUS; SUBCUTANEOUS
Status: DISCONTINUED | OUTPATIENT
Start: 2024-02-10 | End: 2024-02-10 | Stop reason: HOSPADM

## 2024-02-10 RX ORDER — SODIUM CHLORIDE, SODIUM LACTATE, POTASSIUM CHLORIDE, CALCIUM CHLORIDE 600; 310; 30; 20 MG/100ML; MG/100ML; MG/100ML; MG/100ML
INJECTION, SOLUTION INTRAVENOUS
Status: DISCONTINUED | OUTPATIENT
Start: 2024-02-10 | End: 2024-02-10 | Stop reason: SURG

## 2024-02-10 RX ORDER — ROCURONIUM BROMIDE 10 MG/ML
INJECTION, SOLUTION INTRAVENOUS PRN
Status: DISCONTINUED | OUTPATIENT
Start: 2024-02-10 | End: 2024-02-10 | Stop reason: SURG

## 2024-02-10 RX ORDER — ONDANSETRON 2 MG/ML
INJECTION INTRAMUSCULAR; INTRAVENOUS PRN
Status: DISCONTINUED | OUTPATIENT
Start: 2024-02-10 | End: 2024-02-10 | Stop reason: SURG

## 2024-02-10 RX ORDER — HYDROMORPHONE HYDROCHLORIDE 1 MG/ML
0.2 INJECTION, SOLUTION INTRAMUSCULAR; INTRAVENOUS; SUBCUTANEOUS
Status: DISCONTINUED | OUTPATIENT
Start: 2024-02-10 | End: 2024-02-10 | Stop reason: HOSPADM

## 2024-02-10 RX ORDER — CEFAZOLIN SODIUM 1 G/3ML
INJECTION, POWDER, FOR SOLUTION INTRAMUSCULAR; INTRAVENOUS PRN
Status: DISCONTINUED | OUTPATIENT
Start: 2024-02-10 | End: 2024-02-10 | Stop reason: SURG

## 2024-02-10 RX ORDER — SODIUM CHLORIDE, SODIUM LACTATE, POTASSIUM CHLORIDE, CALCIUM CHLORIDE 600; 310; 30; 20 MG/100ML; MG/100ML; MG/100ML; MG/100ML
INJECTION, SOLUTION INTRAVENOUS CONTINUOUS
Status: DISCONTINUED | OUTPATIENT
Start: 2024-02-10 | End: 2024-02-10 | Stop reason: HOSPADM

## 2024-02-10 RX ORDER — LIDOCAINE HYDROCHLORIDE 20 MG/ML
INJECTION, SOLUTION EPIDURAL; INFILTRATION; INTRACAUDAL; PERINEURAL PRN
Status: DISCONTINUED | OUTPATIENT
Start: 2024-02-10 | End: 2024-02-10 | Stop reason: SURG

## 2024-02-10 RX ORDER — DIPHENHYDRAMINE HYDROCHLORIDE 50 MG/ML
12.5 INJECTION INTRAMUSCULAR; INTRAVENOUS
Status: DISCONTINUED | OUTPATIENT
Start: 2024-02-10 | End: 2024-02-10 | Stop reason: HOSPADM

## 2024-02-10 RX ORDER — MEPERIDINE HYDROCHLORIDE 25 MG/ML
12.5 INJECTION INTRAMUSCULAR; INTRAVENOUS; SUBCUTANEOUS
Status: DISCONTINUED | OUTPATIENT
Start: 2024-02-10 | End: 2024-02-10 | Stop reason: HOSPADM

## 2024-02-10 RX ORDER — HYDROMORPHONE HYDROCHLORIDE 1 MG/ML
0.4 INJECTION, SOLUTION INTRAMUSCULAR; INTRAVENOUS; SUBCUTANEOUS
Status: DISCONTINUED | OUTPATIENT
Start: 2024-02-10 | End: 2024-02-10 | Stop reason: HOSPADM

## 2024-02-10 RX ORDER — PHENYLEPHRINE HCL IN 0.9% NACL 0.5 MG/5ML
SYRINGE (ML) INTRAVENOUS PRN
Status: DISCONTINUED | OUTPATIENT
Start: 2024-02-10 | End: 2024-02-10 | Stop reason: SURG

## 2024-02-10 RX ORDER — MIDAZOLAM HYDROCHLORIDE 1 MG/ML
1 INJECTION INTRAMUSCULAR; INTRAVENOUS
Status: DISCONTINUED | OUTPATIENT
Start: 2024-02-10 | End: 2024-02-10 | Stop reason: HOSPADM

## 2024-02-10 RX ORDER — DEXAMETHASONE SODIUM PHOSPHATE 4 MG/ML
INJECTION, SOLUTION INTRA-ARTICULAR; INTRALESIONAL; INTRAMUSCULAR; INTRAVENOUS; SOFT TISSUE PRN
Status: DISCONTINUED | OUTPATIENT
Start: 2024-02-10 | End: 2024-02-10 | Stop reason: SURG

## 2024-02-10 RX ORDER — OXYCODONE HCL 5 MG/5 ML
10 SOLUTION, ORAL ORAL
Status: COMPLETED | OUTPATIENT
Start: 2024-02-10 | End: 2024-02-10

## 2024-02-10 RX ORDER — HALOPERIDOL 5 MG/ML
1 INJECTION INTRAMUSCULAR
Status: DISCONTINUED | OUTPATIENT
Start: 2024-02-10 | End: 2024-02-10 | Stop reason: HOSPADM

## 2024-02-10 RX ORDER — ONDANSETRON 2 MG/ML
4 INJECTION INTRAMUSCULAR; INTRAVENOUS EVERY 4 HOURS PRN
Status: DISCONTINUED | OUTPATIENT
Start: 2024-02-10 | End: 2024-02-11 | Stop reason: HOSPADM

## 2024-02-10 RX ORDER — ONDANSETRON 2 MG/ML
4 INJECTION INTRAMUSCULAR; INTRAVENOUS
Status: COMPLETED | OUTPATIENT
Start: 2024-02-10 | End: 2024-02-10

## 2024-02-10 RX ORDER — KETOROLAC TROMETHAMINE 15 MG/ML
INJECTION, SOLUTION INTRAMUSCULAR; INTRAVENOUS PRN
Status: DISCONTINUED | OUTPATIENT
Start: 2024-02-10 | End: 2024-02-10 | Stop reason: SURG

## 2024-02-10 RX ORDER — OXYCODONE HCL 5 MG/5 ML
5 SOLUTION, ORAL ORAL
Status: COMPLETED | OUTPATIENT
Start: 2024-02-10 | End: 2024-02-10

## 2024-02-10 RX ADMIN — CEFAZOLIN 1.2 G: 1 INJECTION, POWDER, FOR SOLUTION INTRAMUSCULAR; INTRAVENOUS at 16:22

## 2024-02-10 RX ADMIN — CEFTRIAXONE SODIUM 2000 MG: 10 INJECTION, POWDER, FOR SOLUTION INTRAVENOUS at 20:23

## 2024-02-10 RX ADMIN — MORPHINE SULFATE 2 MG: 4 INJECTION, SOLUTION INTRAMUSCULAR; INTRAVENOUS at 21:42

## 2024-02-10 RX ADMIN — FENTANYL CITRATE 50 MCG: 50 INJECTION, SOLUTION INTRAMUSCULAR; INTRAVENOUS at 17:11

## 2024-02-10 RX ADMIN — CEFTRIAXONE SODIUM 2000 MG: 10 INJECTION, POWDER, FOR SOLUTION INTRAVENOUS at 00:34

## 2024-02-10 RX ADMIN — KETOROLAC TROMETHAMINE 15 MG: 15 INJECTION, SOLUTION INTRAMUSCULAR; INTRAVENOUS at 12:13

## 2024-02-10 RX ADMIN — ACETAMINOPHEN 1000 MG: 500 TABLET ORAL at 12:13

## 2024-02-10 RX ADMIN — OXYCODONE HYDROCHLORIDE 10 MG: 5 SOLUTION ORAL at 17:11

## 2024-02-10 RX ADMIN — HALOPERIDOL LACTATE 1 MG: 5 INJECTION, SOLUTION INTRAMUSCULAR at 17:28

## 2024-02-10 RX ADMIN — PRAZOSIN HYDROCHLORIDE 1 MG: 1 CAPSULE ORAL at 20:22

## 2024-02-10 RX ADMIN — LIDOCAINE HYDROCHLORIDE 50 MG: 20 INJECTION, SOLUTION EPIDURAL; INFILTRATION; INTRACAUDAL at 16:16

## 2024-02-10 RX ADMIN — FENTANYL CITRATE 100 MCG: 50 INJECTION, SOLUTION INTRAMUSCULAR; INTRAVENOUS at 16:12

## 2024-02-10 RX ADMIN — OXYCODONE 5 MG: 5 TABLET ORAL at 20:21

## 2024-02-10 RX ADMIN — KETOROLAC TROMETHAMINE 15 MG: 15 INJECTION, SOLUTION INTRAMUSCULAR; INTRAVENOUS at 05:22

## 2024-02-10 RX ADMIN — MORPHINE SULFATE 2 MG: 4 INJECTION, SOLUTION INTRAMUSCULAR; INTRAVENOUS at 06:41

## 2024-02-10 RX ADMIN — KETOROLAC TROMETHAMINE 15 MG: 15 INJECTION, SOLUTION INTRAMUSCULAR; INTRAVENOUS at 16:39

## 2024-02-10 RX ADMIN — ONDANSETRON 4 MG: 2 INJECTION INTRAMUSCULAR; INTRAVENOUS at 17:16

## 2024-02-10 RX ADMIN — LAMOTRIGINE 25 MG: 25 TABLET ORAL at 20:22

## 2024-02-10 RX ADMIN — DEXAMETHASONE SODIUM PHOSPHATE 4 MG: 4 INJECTION INTRA-ARTICULAR; INTRALESIONAL; INTRAMUSCULAR; INTRAVENOUS; SOFT TISSUE at 16:28

## 2024-02-10 RX ADMIN — SODIUM CHLORIDE, POTASSIUM CHLORIDE, SODIUM LACTATE AND CALCIUM CHLORIDE: 600; 310; 30; 20 INJECTION, SOLUTION INTRAVENOUS at 16:09

## 2024-02-10 RX ADMIN — OXYCODONE 5 MG: 5 TABLET ORAL at 03:51

## 2024-02-10 RX ADMIN — PROPOFOL 170 MG: 10 INJECTION, EMULSION INTRAVENOUS at 16:16

## 2024-02-10 RX ADMIN — ONDANSETRON 4 MG: 2 INJECTION INTRAMUSCULAR; INTRAVENOUS at 12:51

## 2024-02-10 RX ADMIN — FENTANYL CITRATE 25 MCG: 50 INJECTION, SOLUTION INTRAMUSCULAR; INTRAVENOUS at 17:35

## 2024-02-10 RX ADMIN — ROCURONIUM BROMIDE 50 MG: 50 INJECTION, SOLUTION INTRAVENOUS at 16:16

## 2024-02-10 RX ADMIN — Medication 100 MCG: at 16:21

## 2024-02-10 RX ADMIN — METRONIDAZOLE 500 MG: 500 INJECTION, SOLUTION INTRAVENOUS at 05:24

## 2024-02-10 RX ADMIN — ACETAMINOPHEN 1000 MG: 500 TABLET ORAL at 05:21

## 2024-02-10 RX ADMIN — ONDANSETRON 4 MG: 2 INJECTION INTRAMUSCULAR; INTRAVENOUS at 16:39

## 2024-02-10 RX ADMIN — OXYCODONE 5 MG: 5 TABLET ORAL at 09:59

## 2024-02-10 RX ADMIN — OLANZAPINE 5 MG: 5 TABLET, FILM COATED ORAL at 20:22

## 2024-02-10 ASSESSMENT — PAIN DESCRIPTION - PAIN TYPE
TYPE: ACUTE PAIN

## 2024-02-10 ASSESSMENT — ENCOUNTER SYMPTOMS
SHORTNESS OF BREATH: 0
MYALGIAS: 0
VOMITING: 0
FEVER: 0
HEARTBURN: 0
ORTHOPNEA: 0
DEPRESSION: 0
BLURRED VISION: 0
COUGH: 0
DIZZINESS: 0
HEADACHES: 0
FOCAL WEAKNESS: 0
PALPITATIONS: 0
NECK PAIN: 0
SPUTUM PRODUCTION: 0
ABDOMINAL PAIN: 0
SORE THROAT: 0
CHILLS: 0
NAUSEA: 0
DOUBLE VISION: 0

## 2024-02-10 ASSESSMENT — PAIN SCALES - GENERAL: PAIN_LEVEL: 2

## 2024-02-10 NOTE — ASSESSMENT & PLAN NOTE
Mild, asymptomatic   Observe for now, can consider further work up if fails to improve with oral hydration (considering she had vomiting prior to presentation)

## 2024-02-10 NOTE — PROGRESS NOTES
Pt arrived to unit via gurney. Pt oriented to room, unit, and plan of care. Patient A&Ox4 on RA. Patient c/o 8/10 dental pain. Updated patient at this time all questions answered at this time. Call light within reach; fall precautions in place.

## 2024-02-10 NOTE — ED NOTES
Bedside report to Adamaris PARRISH, assumed care of patient.  POC discussed with patient. Call light within reach, all needs addressed at this time.     Fall risk interventions in place: Keep floor surfaces clean and dry (all applicable per [place] Fall risk assessment)   Continuous monitoring: Cardiac Leads, Pulse Ox, or Blood Pressure  IVF/IV medications: Not Applicable   Oxygen: Room Air  Bedside sitter: Not Applicable   Isolation: Not Applicable

## 2024-02-10 NOTE — PROGRESS NOTES
4 Eyes Skin Assessment Completed by SHIVANI Patterson and SHIVANI Verde.    Head WDL  Ears WDL  Nose WDL  Mouth WDL  Neck WDL  Breast/Chest WDL  Shoulder Blades WDL  Spine WDL  (R) Arm/Elbow/Hand WDL  (L) Arm/Elbow/Hand WDL  Abdomen WDL  Groin WDL  Scrotum/Coccyx/Buttocks WDL  (R) Leg WDL  (L) Leg WDL  (R) Heel/Foot/Toe WDL  (L) Heel/Foot/Toe WDL          Devices In Places Blood Pressure Cuff and Pulse Ox      Interventions In Place Pillows    Possible Skin Injury No    Pictures Uploaded Into Epic N/A  Wound Consult Placed N/A  RN Wound Prevention Protocol Ordered No

## 2024-02-10 NOTE — ED NOTES
Bedside report received from Casey PARRISH  Assumed patient care. Verified patient identification.  Checked on bed, connected to monitor,  with unlabored respirations. Discussed plan of care.   Vital signs is stable. Denied any new complaints.   Gurney in low position, side rail up for pt safety. Call light within reach.   No needs identified at the moment. Instructed to use call light when needed.

## 2024-02-10 NOTE — ASSESSMENT & PLAN NOTE
- Chronic condition, currently not on any outpatient medications  - Describes with previously following up with neurology, however has not seen them in more than 1 year now  - Outpatient follow-up with neurology

## 2024-02-10 NOTE — CONSULTS
MAXILLOFACIAL SURGERY NOTE    DATE OF CONSULTATION:  2/10/2024    CHIEF COMPLAINT:  Facial swelling tooth pain    HISTORY OF PRESENT ILLNESS:  This is a 34 y.o. female who has a history of pain and swelling in the RIght jaw.  She has had worsening symptoms over the last several days until female came to the hospital.  A dentist in town saw her but said he could not treat the tooth so he sent her to hospital. She does not see a dentist regularly.    Past Medical/ Family / Social history (PFSH):   Past Medical History:   Active Ambulatory Problems     Diagnosis Date Noted    Multiple sclerosis (HCC) 10/04/2017    Chronic pain syndrome 10/04/2017    Encounter for initial prescription of injectable contraceptive 10/12/2017    Marijuana use 10/12/2017    Spasticity 02/14/2018    Chronic low back pain without sciatica 02/14/2018    Severe episode of recurrent major depressive disorder, without psychotic features ([facility]) 02/14/2018    Mood changes 05/28/2020    Nightmares associated with chronic post-traumatic stress disorder 06/30/2020    Bipolar 1 disorder ([facility]) 01/05/2023    Encounter for surveillance of contraceptive pills 01/05/2023    Migraine without aura and without status migrainosus, not intractable 03/23/2023    Right wrist pain 03/23/2023    Attention deficit hyperactivity disorder, combined type 02/08/2024    Posttraumatic stress disorder 02/08/2024     Resolved Ambulatory Problems     Diagnosis Date Noted    Marijuana abuse 10/06/2017    Dental abscess 01/01/2020    Pruritus 06/10/2020    Chronic nausea 06/30/2020     Past Medical History:   Diagnosis Date    MS (multiple sclerosis) ([facility])      Past Medical History:   Diagnosis Date    MS (multiple sclerosis) ([facility])          Past Surgical History:   None    Current Outpatient Medications:   Current Facility-Administered Medications   Medication Dose    [MAR Hold] ondansetron (Zofran) syringe/vial injection 4 mg  4 mg    [MAR Hold] senna-docusate (Pericolace Or  Senokot S) 8.6-50 MG per tablet 1 Tablet  1 Tablet    And    [MAR Hold] polyethylene glycol/lytes (Miralax) Packet 1 Packet  1 Packet    [MAR Hold] Pharmacy Consult Request ...Pain Management Review 1 Each  1 Each    [MAR Hold] acetaminophen (Tylenol) tablet 1,000 mg  1,000 mg    Followed by    [MAR Hold] acetaminophen (Tylenol) tablet 1,000 mg  1,000 mg    [MAR Hold] ketorolac (Toradol) 15 MG/ML injection 15 mg  15 mg    [MAR Hold] oxyCODONE immediate-release (Roxicodone) tablet 2.5 mg  2.5 mg    Or    [MAR Hold] oxyCODONE immediate-release (Roxicodone) tablet 5 mg  5 mg    Or    [MAR Hold] morphine 4 MG/ML injection 2 mg  2 mg    [MAR Hold] OLANZapine (ZyPREXA) tablet 5 mg  5 mg    [MAR Hold] prazosin (Minipress) capsule 1 mg  1 mg    [MAR Hold] lamoTRIgine (LaMICtal) tablet 25 mg  25 mg    [MAR Hold] cefTRIAXone (Rocephin) syringe 2,000 mg  2,000 mg    [MAR Hold] metroNIDAZOLE (Flagyl) IVPB 500 mg  500 mg       Medication Allergy/Sensitivities:   PCN    Family History:   Denies hx of cancer, DM, HTN     Social History:   Alcohol, marijuana,     Allergies:  Allergies   Allergen Reactions    Pcn [Penicillins]      Hives  Pt unsure of reaction       REVIEW OF SYSTEMS:  Denies CP, Denies SOB, Denies any Changes in vision, no nausea, no GI upset, 12 point ROS was done and is negative per the HPI.  Facial pain.     PHYSICAL EXAMINATION:  VITAL SIGNS:  Stable.  female is afebrile.  GENERAL:  female is in no acute distress.  HEENT:  Head is normocephalic and atraumatic. Pain right jaw  EARS: TM clear.    EYES: Extraocular   muscles are intact with no entrapment.  Pupils equally round and reactive to light and   accommodation.    NOSE: Nares are patent bilateral with no crepitus of the nasal bones  ORAL:   45 mm mouth opening.  no deviation upon opening.  Dentition is generally in poor repair. Severe pain to palpation #30.   THROAT:  Mallampati 1  HEART:  His heart was regular rate and rhythm.  LUNGS:  Clear to  auscultation bilaterally.    X-RAYS:  Normal trabecular pattern of bone- several nonrestorable teeth.  Large caries #30    CT: inflammation right masseter and pterygomandibular region.    ASSESSMENT:  This is a 34 y.o. female who has Generalized caries and root fragment. Severely painfull bebeto #30 and dentist unable to extract.  Possible other bad teeth. Buccal space cellulitis    PLAN:  Pt declines extraoral drains which is fine.  Doubtful but possible she needs drain at later visit. Will extract #30 and do exam under ant anesthesia.  Incision and drainage of buccal space abscess right side. Intraorally.    Dispo: Home tomorrow after antibiotics tonight IV  Abx- 1 week Azythromycin and metronidazole  Pain meds; prn  Follow up or OR instructions     ____________________________________     JULISA LIN DMD,MD

## 2024-02-10 NOTE — CARE PLAN
The patient is Stable - Low risk of patient condition declining or worsening    Shift Goals  Clinical Goals: Dental extraction, pain mgmt  Patient Goals: Rest  Family Goals: Comfort    Progress made toward(s) clinical / shift goals:    Problem: Pain - Standard  Goal: Alleviation of pain or a reduction in pain to the patient’s comfort goal  Outcome: Progressing     Problem: Knowledge Deficit - Standard  Goal: Patient and family/care givers will demonstrate understanding of plan of care, disease process/condition, diagnostic tests and medications  Outcome: Progressing       Patient is not progressing towards the following goals:

## 2024-02-10 NOTE — ASSESSMENT & PLAN NOTE
Establish with psychiatry outpatient  - Continue home prazosin  - Continue following up with psychiatry

## 2024-02-10 NOTE — ASSESSMENT & PLAN NOTE
Ongoing since 12/23. CT on presentation showing inflammatory changes right mandible extending into superficial masseter muscle without discrete abscess. RIGHT parapharyngeal soft tissue edema also noted, without associated abscess. No tooth abscess demonstrated.     - Antibiotic therapy (patient allergic to penicillins, considering prior use of cephalosporins, will trial ceftriaxone with metronidazole- discussed allergy with pharmacy)   - Optimize pain regimen with oral and IV pain meds    Dr. Bismark briscoe appreciated - Incision and drainage of buccal space abscess right side under general anesthesia  Plan for IV abx post procedure overnight.

## 2024-02-10 NOTE — ED NOTES
Med Rec complete per patient   Allergies reviewed  Antibiotics in the past 30 days:yes  Anticoagulant in past 14 days:no  Pharmacy patient utilizes:[facility] on [address]    Pt states she is on day 3 or 4 of the 7 day course of Clindamycin 300 mg     Pt states she hasn't been able to take any medications due to her pain in mouth

## 2024-02-10 NOTE — ED PROVIDER NOTES
ED PHYSICIAN NOTE    CHIEF COMPLAINT  Chief Complaint   Patient presents with    Dental Pain     PATIENT WAS WAS SEEN THIS AM FOR A DENTAL ISSUE. PATIENT STATES SHE HAS BEEN ON ANTIBIOTICS SINCE THIS MONDAY. PT STATES PAIN ORIGINALLY BEGAN THE END OF DECEMBER. PT STATES SHE WAS AT [facility] AND DENTIST LOOKED AT TOOTH MANIPULATED IT AND THEN WOULD NOT DO ANY FURTHER PROCEDURES-PER PT PATIENT ALSO STATES SHE CAN NOW TASTE BLOOD AND PUS.        EXTERNAL RECORDS REVIEWED  Patient in emergency department earlier this morning.  Was advised to follow-up with dentist.  Was placed on antibiotics.    Patient has history of PTSD ADHD, bipolar.    HPI/ROS    OUTSIDE HISTORIAN(S):   reports that the dentist just kicked her out of the office today.    Nanci Mccormack is a 34 y.o. female who presents right mandible pain.  This is been going on for over a month.  Seems to come and go.  She has been on a few courses of antibiotics.  Pain started getting worse recently.  Seen in emergency department today.  Given a prescription for antibiotics.  Went to a dental appointment after her evaluation.  She had a panoramic x-ray.  The exam was begun.  The patient reports that she twitched because it hurt when he touched her jaw and he took his gloves off and said he could not help her.  She presents now because still has severe pain.    PAST MEDICAL HISTORY  Past Medical History:   Diagnosis Date    MS (multiple sclerosis) ([facility])        SOCIAL HISTORY  Social History     Tobacco Use    Smoking status: Never    Smokeless tobacco: Never   Vaping Use    Vaping Use: Never used   Substance Use Topics    Alcohol use: Yes     Comment: 3-5 times weekly    Drug use: Yes     Types: Inhaled, Marijuana     Comment: daily marijuana       CURRENT MEDICATIONS  Home Medications    **Home medications have not yet been reviewed for this encounter**         ALLERGIES  Allergies   Allergen Reactions    Pcn [Penicillins]      Hives    "      PHYSICAL EXAM  VITAL SIGNS: /79   Pulse 89   Temp 36.4 °C (97.6 °F) (Temporal)   Resp 18   Ht 1.549 m (5' 1\")   Wt 40.8 kg (90 lb)   LMP  (LMP Unknown) Comment: on birth control  SpO2 98%   BMI 17.01 kg/m²    Constitutional: Awake and alert  HENT: Slight right mandibular swelling.  Swelling over the right gingiva overlying the molars.  There appears to be a periapical abscess that is difficult to assess because the patient cannot keep her mouth open.  There is no trismus.  No tongue elevation.  No swelling of the neck.  Eyes: Normal inspection  Neck: Grossly normal range of motion.  Cardiovascular: Normal heart rate, Normal rhythm.  Symmetric peripheral pulses.   Thorax & Lungs: No respiratory distress, No wheezing, No rales, No rhonchi, No chest tenderness.   Abdomen: Bowel sounds normal, soft, non-distended, nontender, no mass  Skin: No obvious rash.  Back: No tenderness, No CVA tenderness.   Extremities: No clubbing, cyanosis, edema, no Homans or cords.  Neurologic: Grossly normal   Psychiatric: Normal for situation     DIAGNOSTIC STUDIES / PROCEDURES  LABS/EKG  Results for orders placed or performed during the hospital encounter of 02/09/24   CBC WITH DIFFERENTIAL   Result Value Ref Range    WBC 13.9 (H) 4.8 - 10.8 K/uL    RBC 4.31 4.20 - 5.40 M/uL    Hemoglobin 13.6 12.0 - 16.0 g/dL    Hematocrit 43.1 37.0 - 47.0 %    .0 (H) 81.4 - 97.8 fL    MCH 31.6 27.0 - 33.0 pg    MCHC 31.6 (L) 32.2 - 35.5 g/dL    RDW 47.1 35.9 - 50.0 fL    Platelet Count 321 164 - 446 K/uL    MPV 9.0 9.0 - 12.9 fL    Neutrophils-Polys 82.10 (H) 44.00 - 72.00 %    Lymphocytes 12.90 (L) 22.00 - 41.00 %    Monocytes 4.00 0.00 - 13.40 %    Eosinophils 0.40 0.00 - 6.90 %    Basophils 0.20 0.00 - 1.80 %    Immature Granulocytes 0.40 0.00 - 0.90 %    Nucleated RBC 0.00 0.00 - 0.20 /100 WBC    Neutrophils (Absolute) 11.42 (H) 1.82 - 7.42 K/uL    Lymphs (Absolute) 1.79 1.00 - 4.80 K/uL    Monos (Absolute) 0.56 0.00 - " 0.85 K/uL    Eos (Absolute) 0.05 0.00 - 0.51 K/uL    Baso (Absolute) 0.03 0.00 - 0.12 K/uL    Immature Granulocytes (abs) 0.05 0.00 - 0.11 K/uL    NRBC (Absolute) 0.00 K/uL   HCG QUAL SERUM   Result Value Ref Range    Beta-Hcg Qualitative Serum Negative Negative   Comp Metabolic Panel   Result Value Ref Range    Sodium 134 (L) 135 - 145 mmol/L    Potassium 4.9 3.6 - 5.5 mmol/L    Chloride 103 96 - 112 mmol/L    Co2 17 (L) 20 - 33 mmol/L    Anion Gap 14.0 7.0 - 16.0    Glucose 76 65 - 99 mg/dL    Bun 7 (L) 8 - 22 mg/dL    Creatinine 0.53 0.50 - 1.40 mg/dL    Calcium 9.3 8.5 - 10.5 mg/dL    Correct Calcium 9.1 8.5 - 10.5 mg/dL    AST(SGOT) 19 12 - 45 U/L    ALT(SGPT) 6 2 - 50 U/L    Alkaline Phosphatase 77 30 - 99 U/L    Total Bilirubin 0.5 0.1 - 1.5 mg/dL    Albumin 4.2 3.2 - 4.9 g/dL    Total Protein 7.3 6.0 - 8.2 g/dL    Globulin 3.1 1.9 - 3.5 g/dL    A-G Ratio 1.4 g/dL   ESTIMATED GFR   Result Value Ref Range    GFR (CKD-EPI) 124 >60 mL/min/1.73 m 2       RADIOLOGY  I have independently interpreted the diagnostic imaging associated with this visit and am waiting the final reading from the radiologist.   My preliminary interpretation is as follows: Panoramic x-ray negative  Radiologist interpretation:                     CT-MAXILLOFACIAL WITH PLUS RECONS   Final Result      1.  Inflammatory changes localized the RIGHT mandible extending into the superficial masseter muscle without discrete abscess.   2.  RIGHT parapharyngeal soft tissue edema also noted, without associated abscess.   3.  No tooth abscess demonstrated.            COURSE & MEDICAL DECISION MAKING    INITIAL ASSESSMENT, COURSE AND PLAN  Care Narrative: Patient presents with pain and swelling over the right mandible.  She appears to have periapical abscess over the first mandibular molar on the right.  There is no tongue elevation or suggestion of Jesus's she does have tenderness and swelling externally.  Ordered laboratory data.    Patient with  leukocytosis.    I did discuss case with Dr. Sofia.  Requested diagnostic imaging.  Plan for operative dental extraction tomorrow.  Admit to medicine service.    Consulted hospitalist for admission.          ADDITIONAL PROBLEM LIST  Past Medical History:   Diagnosis Date    MS (multiple sclerosis) ([facility])        DISPOSITION AND DISCUSSIONS  I have discussed management of the patient with the following physicians and STEVE's: Dr. Sofia, oral surgery; hospitalist      FINAL IMPRESSION  1.  Dental abscess    This dictation was created using voice recognition software. The accuracy of the dictation is limited to the abilities of the software. I expect there may be some errors of grammar and possibly content. The nursing notes were reviewed and certain aspects of this information were incorporated into this note.    Electronically signed by: Virgilio Dumont M.D., 2/9/2024

## 2024-02-10 NOTE — H&P
[facility] Internal Medicine History & Physical Note    Date of Service  2/9/2024    [facility] Team: N/A    Attending: Bahman Johns M.d.  Senior Resident: Dr. Lopez   Intern:  N/A   Contact Number: [phone]    Primary Care Physician  Isabel Medrano M.D.    Consultants  Surgery     Specialist Names: Dr. Sofia     Code Status  Full Code    Chief Complaint  Chief Complaint   Patient presents with    Dental Pain     PATIENT WAS WAS SEEN THIS AM FOR A DENTAL ISSUE. PATIENT STATES SHE HAS BEEN ON ANTIBIOTICS SINCE THIS MONDAY. PT STATES PAIN ORIGINALLY BEGAN THE END OF DECEMBER. PT STATES SHE WAS AT ABSOLUTE DENTAL AND DENTIST LOOKED AT TOOTH MANIPULATED IT AND THEN WOULD NOT DO ANY FURTHER PROCEDURES-PER PT PATIENT ALSO STATES SHE CAN NOW TASTE BLOOD AND PUS.        History of Presenting Illness (HPI):   Nanci Mccormack is a 34 y.o. female who presented 2/9/2024 with PMHx of bipolar disorder, PTSD, multiple sclerosis, migraines, prior hx of dental surgery & submandible abscess I&D 2020, who presents to ED today with chief complaints of dental pain.     This has been ongoing since 12/23.  Describes her right mandibular teeth swells up, forms an abscess which ultimately drains and she starts feeling better.  Endorses having multiple such episodes since December last year.  Has tried outpatient antibiotic therapy with minimal relief.  Has been taking ibuprofen to help with pain with suboptimal response.  Describes she was scheduled to see her dentist, however she cannot see them until February which really concerned her.  She was recently seen in the ED this morning and sent home with clindamycin.  However, patient describes she developed fevers and chills after she got home.  Fever as high as 101.2 Fahrenheit.  She also had some nausea and vomiting.  She was really concerned so she came back to the ED.    ERP discussed the case with Dr. Sofia, oral surgery who recommended admission for operative dental  extraction tomorrow.       Labs significant for mild leukocytosis at 13.9, 80% neutrophils   Hyponatremia, mild with sodium 134  Bicarb 17      Patient afebrile, vital stable, blood pressure 126/93    I discussed the plan of care with patient, ERP, and my attending physician .    Review of Systems  Review of Systems   Constitutional:  Positive for chills and fever. Negative for malaise/fatigue.   HENT:  Negative for ear discharge, ear pain, hearing loss and tinnitus.         Tooth ache    Eyes:  Negative for blurred vision and photophobia.   Respiratory:  Negative for cough, hemoptysis and sputum production.    Cardiovascular:  Negative for chest pain and palpitations.   Gastrointestinal:  Negative for heartburn, nausea and vomiting.   Genitourinary:  Negative for dysuria and urgency.   Musculoskeletal:  Negative for myalgias and neck pain.   Skin:  Negative for itching and rash.   Neurological:  Negative for dizziness and headaches.   Psychiatric/Behavioral:  The patient is not nervous/anxious.        Past Medical History   has a past medical history of MS (multiple sclerosis) (HCC).    Surgical History   has a past surgical history that includes cyst excision (Right); pr dental surgery procedure (1/1/2020); and submandible abscess incision and drainage (1/1/2020).     Family History  family history includes Cancer in her maternal aunt and mother; Hypertension in her mother; No Known Problems in her brother, brother, daughter, daughter, sister, and sister.   Family history reviewed with patient.     Social History  Tobacco: Denies  Alcohol: Socially, 4-5 drinks on the weekend   Recreational drugs (illegal or prescription): Smokes marijuana   Employment: Works for [employer]  Living Situation: Independent   Recent Travel: Denies   Primary Care Provider: Reviewed    Other (stressors, spirituality, exposures): N/A    Allergies  Allergies   Allergen Reactions    Pcn [Penicillins]      Hives  Pt unsure of  reaction       Medications  Prior to Admission Medications   Prescriptions Last Dose Informant Patient Reported? Taking?   LamoTRIgine 50 MG TABLET SR 24 HR LAST WEEK at [facility] Patient No No   Sig: TAKE 1 TABLET BY MOUTH AT BEDTIME   Patient taking differently: Take 50 mg by mouth at bedtime.   OLANZapine (ZYPREXA) 5 MG Tab 2/1/2024 at [facility] Patient No No   Sig: Take 1 Tablet by mouth every evening for 30 days.   baclofen (LIORESAL) 10 MG Tab COUPLE DAYS AGO at [facility] Patient Yes Yes   Sig: Take 10 mg by mouth one time as needed. Indications: Muscle Spasm   clindamycin (CLEOCIN) 300 MG Cap 2/9/2024 at AM Patient No No   Sig: Take 1 Capsule by mouth 3 times a day for 7 days.   ibuprofen (MOTRIN) 200 MG Tab 2/9/2024 at 0000 Patient Yes Yes   Sig: Take 800 mg by mouth one time as needed for Mild Pain. 800 mg = 4 tabs   ondansetron (ZOFRAN ODT) 4 MG TABLET DISPERSIBLE 2/5/2024 at [facility] Patient No No   Sig: DISSOLVE 1 TABLET IN MOUTH EVERY 6 HOURS AS NEEDED FOR NAUSEA AND VOMITING   prazosin (MINIPRESS) 1 MG Cap LAST WEEK at [facility] Patient No No   Sig: Take 1 Capsule by mouth every evening.      Facility-Administered Medications: None       Physical Exam  Temp:  [35.9 °C (96.7 °F)-37.2 °C (99 °F)] 37.2 °C (99 °F)  Pulse:  [71-95] 95  Resp:  [15-18] 18  BP: (118-139)/() 139/102  SpO2:  [94 %-98 %] 98 %  Blood Pressure: 124/78   Temperature: 36.4 °C (97.6 °F)   Pulse: 89   Respiration: 18   Pulse Oximetry: 98 %       Physical Exam  Constitutional:       General: She is not in acute distress.     Appearance: Normal appearance. She is not ill-appearing.   HENT:      Head: Normocephalic and atraumatic.      Right Ear: External ear normal.      Left Ear: External ear normal.      Nose: Nose normal.      Mouth/Throat:      Mouth: Mucous membranes are moist.      Pharynx: No oropharyngeal exudate.   Eyes:      General:         Right eye: No discharge.         Left eye: No discharge.      Extraocular Movements: Extraocular movements  "intact.      Pupils: Pupils are equal, round, and reactive to light.   Cardiovascular:      Rate and Rhythm: Normal rate and regular rhythm.      Pulses: Normal pulses.      Heart sounds: Normal heart sounds.   Pulmonary:      Effort: Pulmonary effort is normal.      Breath sounds: Normal breath sounds.   Abdominal:      General: Abdomen is flat. Bowel sounds are normal.      Palpations: Abdomen is soft.   Musculoskeletal:         General: Normal range of motion.      Cervical back: Normal range of motion.   Skin:     General: Skin is warm.      Capillary Refill: Capillary refill takes less than 2 seconds.   Neurological:      Mental Status: She is alert and oriented to person, place, and time.   Psychiatric:         Mood and Affect: Mood normal.         Laboratory:  Recent Labs     02/09/24  1720   WBC 13.9*   RBC 4.31   HEMOGLOBIN 13.6   HEMATOCRIT 43.1   .0*   MCH 31.6   MCHC 31.6*   RDW 47.1   PLATELETCT 321   MPV 9.0     Recent Labs     02/09/24 2005   SODIUM 134*   POTASSIUM 4.9   CHLORIDE 103   CO2 17*   GLUCOSE 76   BUN 7*   CREATININE 0.53   CALCIUM 9.3     Recent Labs     02/09/24 2005   ALTSGPT 6   ASTSGOT 19   ALKPHOSPHAT 77   TBILIRUBIN 0.5   GLUCOSE 76         No results for input(s): \"NTPROBNP\" in the last 72 hours.      No results for input(s): \"TROPONINT\" in the last 72 hours.    Imaging:  [id number]   Final Result      1.  No mandible fracture.   2.  No tooth abscess demonstrated.      CT-MAXILLOFACIAL WITH PLUS RECONS   Final Result      1.  Inflammatory changes localized the RIGHT mandible extending into the superficial masseter muscle without discrete abscess.   2.  RIGHT parapharyngeal soft tissue edema also noted, without associated abscess.   3.  No tooth abscess demonstrated.              Assessment/Plan:  Problem Representation:   I anticipate this patient is appropriate for observation status at this time because of anticipated discharge after surgical " procedure    Patient will need a Med/Surg bed on MEDICAL service .  The need is secondary to dental infection.    * Dental infection- (present on admission)  Assessment & Plan  Ongoing since 12/23. CT on presentation showing inflammatory changes right mandible extending into superficial masseter muscle without discrete abscess. RIGHT parapharyngeal soft tissue edema also noted, without associated abscess. No tooth abscess demonstrated.     ERP discussed the case with oral surgery, Dr. Sofia who recommended admission for operative dental extraction tomorrow  - Admit to medicine  - NPO at midnight for operative dental extraction tomorrow  - Hold chemical DVT prophylaxis, SCDs ordered   - Antibiotic therapy (patient allergic to penicillins, considering prior use of cephalosporins, will trial ceftriaxone with metronidazole- discussed allergy with pharmacy)   - Optimize pain regimen with oral and IV pain meds    Hyponatremia- (present on admission)  Assessment & Plan  Mild, asymptomatic   Observe for now, can consider further work up if fails to improve with oral hydration (considering she had vomiting prior to presentation)     Metabolic acidosis- (present on admission)  Assessment & Plan  Mild non anion gap metabolic acidosis on presentation with bicarb 17. Anion gap WNL   Vomiting the day of presentation   Observe for now     Posttraumatic stress disorder- (present on admission)  Assessment & Plan  - Cont home prazosin     Migraine without aura and without status migrainosus, not intractable- (present on admission)  Assessment & Plan  - Cont home lamotrigine   - Outpatient neurology follow up     Bipolar 1 disorder (HCC)- (present on admission)  Assessment & Plan  - Cont home olanzapine   - Established with psychiatry outpatient, appreciate help    Nightmares associated with chronic post-traumatic stress disorder- (present on admission)  Assessment & Plan  Establish with psychiatry outpatient  - Continue home  prazosin  - Continue following up with psychiatry    Marijuana use- (present on admission)  Assessment & Plan  - Cessation counseling    Multiple sclerosis (HCC)- (present on admission)  Assessment & Plan  - Chronic condition, currently not on any outpatient medications  - Describes with previously following up with neurology, however has not seen them in more than 1 year now  - Outpatient follow-up with neurology        VTE prophylaxis: SCDs/TEDs

## 2024-02-10 NOTE — ED NOTES
Follow up phlebotomist for the blood extraction of green top, as per endorsement green top was hemolyzed and that phlebotomist are aware.

## 2024-02-10 NOTE — ASSESSMENT & PLAN NOTE
Mild non anion gap metabolic acidosis on presentation with bicarb 19. Anion gap WNL   Vomiting the day of presentation   Observe for now

## 2024-02-10 NOTE — ED TRIAGE NOTES
"Chief Complaint   Patient presents with    Dental Pain     PATIENT WAS WAS SEEN THIS AM FOR A DENTAL ISSUE. PATIENT STATES SHE HAS BEEN ON ANTIBIOTICS SINCE THIS MONDAY. PT STATES PAIN ORIGINALLY BEGAN THE END OF DECEMBER. PT STATES SHE WAS AT ABSOLUTE DENTAL AND DENTIST LOOKED AT TOOTH MANIPULATED IT AND THEN WOULD NOT DO ANY FURTHER PROCEDURES-PER PT PATIENT ALSO STATES SHE CAN NOW TASTE BLOOD AND PUS.            PT AMBULATORY TO TRIAGE. Pt AA&O X 4, SEE ABOVE.     PT TO LOBBY . PT EDUCATED ON ALERTING STAFF TO CHANGES IN CONDITION. PT VERBALIZED AN UNDERSTANDING.     BP (!) 126/93   Pulse 82   Temp 36.4 °C (97.6 °F) (Temporal)   Resp 16   Ht 1.549 m (5' 1\")   Wt 40.8 kg (90 lb)   LMP  (LMP Unknown) Comment: on birth control  SpO2 98%   BMI 17.01 kg/m²     "

## 2024-02-10 NOTE — PROGRESS NOTES
Report received from night shift RN. Patient A&Ox4, in bed resting comfortably. VSS, report 3/10 pain in right jaw area, bed in lowest position and locked, call light in reach.

## 2024-02-10 NOTE — CARE PLAN
The patient is Stable - Low risk of patient condition declining or worsening    Shift Goals  Clinical Goals: Dental extraction -AM, pain management  Patient Goals: Pain relief, rest  Family Goals: Pt comfort    Progress made toward(s) clinical / shift goals:  Patient verbalizes understanding of plan of care. Patient's pain well managed with medication per the MAR. Patient able to sleep comfortably.  Problem: Pain - Standard  Goal: Alleviation of pain or a reduction in pain to the patient’s comfort goal  Description: Target End Date:  Prior to discharge or change in level of care    Document on Vitals flowsheet    1.  Document pain using the appropriate pain scale per order or unit policy  2.  Educate and implement non-pharmacologic comfort measures (i.e. relaxation, distraction, massage, cold/heat therapy, etc.)  3.  Pain management medications as ordered  4.  Reassess pain after pain med administration per policy  5.  If opiods administered assess patient's response to pain medication is appropriate per POSS sedation scale  6.  Follow pain management plan developed in collaboration with patient and interdisciplinary team (including palliative care or pain specialists if applicable)  Outcome: Progressing     Problem: Knowledge Deficit - Standard  Goal: Patient and family/care givers will demonstrate understanding of plan of care, disease process/condition, diagnostic tests and medications  Description: Target End Date:  1-3 days or as soon as patient condition allows    Document in Patient Education    1.  Patient and family/caregiver oriented to unit, equipment, visitation policy and means for communicating concern  2.  Complete/review Learning Assessment  3.  Assess knowledge level of disease process/condition, treatment plan, diagnostic tests and medications  4.  Explain disease process/condition, treatment plan, diagnostic tests and medications  Outcome: Progressing       Patient is not progressing towards the  following goals: N/A

## 2024-02-11 ENCOUNTER — PHARMACY VISIT (OUTPATIENT)
Dept: PHARMACY | Facility: MEDICAL CENTER | Age: 34
End: 2024-02-11
Payer: COMMERCIAL

## 2024-02-11 VITALS
DIASTOLIC BLOOD PRESSURE: 74 MMHG | SYSTOLIC BLOOD PRESSURE: 113 MMHG | BODY MASS INDEX: 16.44 KG/M2 | HEIGHT: 61 IN | TEMPERATURE: 97.4 F | HEART RATE: 87 BPM | RESPIRATION RATE: 18 BRPM | OXYGEN SATURATION: 98 % | WEIGHT: 87.08 LBS

## 2024-02-11 PROCEDURE — RXMED WILLOW AMBULATORY MEDICATION CHARGE: Performed by: STUDENT IN AN ORGANIZED HEALTH CARE EDUCATION/TRAINING PROGRAM

## 2024-02-11 PROCEDURE — 700102 HCHG RX REV CODE 250 W/ 637 OVERRIDE(OP): Mod: UD | Performed by: INTERNAL MEDICINE

## 2024-02-11 PROCEDURE — 700111 HCHG RX REV CODE 636 W/ 250 OVERRIDE (IP): Mod: JZ,UD

## 2024-02-11 PROCEDURE — 99239 HOSP IP/OBS DSCHRG MGMT >30: CPT | Performed by: STUDENT IN AN ORGANIZED HEALTH CARE EDUCATION/TRAINING PROGRAM

## 2024-02-11 PROCEDURE — A9270 NON-COVERED ITEM OR SERVICE: HCPCS | Mod: UD | Performed by: INTERNAL MEDICINE

## 2024-02-11 PROCEDURE — G0378 HOSPITAL OBSERVATION PER HR: HCPCS

## 2024-02-11 PROCEDURE — 700111 HCHG RX REV CODE 636 W/ 250 OVERRIDE (IP): Mod: JZ,JG,UD | Performed by: INTERNAL MEDICINE

## 2024-02-11 PROCEDURE — 96376 TX/PRO/DX INJ SAME DRUG ADON: CPT

## 2024-02-11 RX ORDER — AZITHROMYCIN 250 MG/1
TABLET, FILM COATED ORAL
Qty: 6 TABLET | Refills: 0 | Status: SHIPPED | OUTPATIENT
Start: 2024-02-11 | End: 2024-03-23

## 2024-02-11 RX ORDER — METRONIDAZOLE 500 MG/1
500 TABLET ORAL EVERY 12 HOURS
Qty: 14 TABLET | Refills: 0 | Status: SHIPPED | OUTPATIENT
Start: 2024-02-11 | End: 2024-03-25

## 2024-02-11 RX ORDER — OXYCODONE HYDROCHLORIDE AND ACETAMINOPHEN 5; 325 MG/1; MG/1
1 TABLET ORAL EVERY 8 HOURS PRN
Qty: 9 TABLET | Refills: 0 | Status: SHIPPED | OUTPATIENT
Start: 2024-02-11 | End: 2024-03-21

## 2024-02-11 RX ADMIN — ONDANSETRON 4 MG: 2 INJECTION INTRAMUSCULAR; INTRAVENOUS at 06:08

## 2024-02-11 RX ADMIN — OXYCODONE 5 MG: 5 TABLET ORAL at 01:23

## 2024-02-11 RX ADMIN — METRONIDAZOLE 500 MG: 500 INJECTION, SOLUTION INTRAVENOUS at 05:26

## 2024-02-11 RX ADMIN — OXYCODONE 5 MG: 5 TABLET ORAL at 06:07

## 2024-02-11 RX ADMIN — MORPHINE SULFATE 2 MG: 4 INJECTION, SOLUTION INTRAMUSCULAR; INTRAVENOUS at 08:01

## 2024-02-11 ASSESSMENT — PAIN DESCRIPTION - PAIN TYPE
TYPE: ACUTE PAIN

## 2024-02-11 NOTE — OP REPORT
Operative Note    2/10/2024     Patient ID:   Name:             Nanci Mccormack   YOB: 1990  Age:                 34 y.o.  female   MRN:               [id number]                                                      PreOp: Diagnosis: Buccal space abscess right side, and abscessed tooth number and 30    PostOp Diagnosis: Same    Procedure(s):  Right buccal space ABSCESS INCISION AND DRAINAGE - Wound Class: Dirty or Infected  DENTAL EXTRACTION(S) - Wound Class: Dirty or Infected    OPERATION:   After a thorough discussion about the risk benefits and alternatives to the procedure the pt gave written and verbal consent for the procedure. Right buccal space ABSCESS INCISION AND DRAINAGE - Wound Class: Dirty or Infected  DENTAL EXTRACTION(S) - Wound Class: Dirty or Infected.  Next, pt was brought back to the OR in supine position. Anesthesia brought the patient to adequate sedation and then intubated the patient via endotracheal technique. Please see their notes for complete details.  Next the patient was prepped and draped as usual for a procedure in the OR.  Local anesthesia was deposited via routine fashion.  Incision was made in the gingiva adjacent tooth 30 and a full thickness mucoperiosteal flap was laid.  Purulence was drained from this area. Tooth number 30 was then surgically removed.  Currette was used to currette out the extraction sockets. There were no complications. Pt tolerated the procedure well.  Pt was discharged to the OR in a stable condition.     Surgeon(s):  Tony Sofia D.M.D.,MMAURIZIO    Anesthesiologist/Type of Anesthesia: Anesthesiologist: Caleb Zepeda M.D.    Specimen: none    Estimated Blood Loss: minimal    FLUIDS: approximately 500ml normal saline    Findings: consistent with diagnosis    Dressings: NONE    Drains: NONE    Complications: none

## 2024-02-11 NOTE — ANESTHESIA TIME REPORT
Anesthesia Start and Stop Event Times       Date Time Event    2/10/2024 1608 Ready for Procedure     1609 Anesthesia Start     1651 Anesthesia Stop          Responsible Staff  02/10/24      Name Role Begin End    Caleb Zepeda M.D. Anesth 1609 1651          Overtime Reason:  no overtime (within assigned shift)    Comments:

## 2024-02-11 NOTE — PROGRESS NOTES
[facility] Medicine Daily Progress Note    Date of Service  2/10/2024    Chief Complaint  Nanci Mccormack is a 34 y.o. female admitted 2/9/2024 with dental caries and abscess    Hospital Course  Nanci Mccormack is a 34 y.o. female who presented 2/9/2024 with PMHx of bipolar disorder, PTSD, multiple sclerosis, migraines, prior hx of dental surgery & submandible abscess I&D 2020, who presents to ED with chief complaints of dental pain.      This has been ongoing since 12/23.  Described her right mandibular teeth swells up, forms an abscess which ultimately drains and she starts feeling better. Endorses having multiple such episodes since December last year.  Has tried outpatient antibiotic therapy with minimal relief.  Has been taking ibuprofen to help with pain with suboptimal response.    Described she was scheduled to see her dentist; however, she cannot see them until February which really concerned her.  She was recently seen in the ED 2/9 and sent home with clindamycin.  However, patient describes she developed fevers and chills after she got home.  Fever as high as 101.2 Fahrenheit.  She also had some nausea and vomiting.  She was really concerned so she came back to the ED.     Pt was admitted for further management.    Interval Problem Update  2/10  Vitals reviewed; afebrile.  The rest of the vitals within normal parameters.  Pain scale reported - 7-8 in right jaw    At bedside, quite pleasant and she recounted the event leading to the hospital.    OR plan today discussed and Dr. Bismark briscoe appreciated - Incision and drainage of buccal space abscess right side under general anethesia    Plan for IV abx post procedure overnight.     I have discussed this patient's plan of care and discharge plan at IDT rounds today with Case Management, Nursing, Nursing leadership, and other members of the IDT team.    Consultants/Specialty  Oral surgery    Code Status  Full Code    Disposition  The patient is  not medically cleared for discharge to home or a post-acute facility.  Anticipate discharge to: home with close outpatient follow-up    I have placed the appropriate orders for post-discharge needs.    Review of Systems  Review of Systems   Constitutional:  Negative for chills, fever and malaise/fatigue.   HENT:  Negative for congestion, sore throat and tinnitus.         Dental pain   Eyes:  Negative for blurred vision and double vision.   Respiratory:  Negative for cough, sputum production and shortness of breath.    Cardiovascular:  Negative for chest pain, palpitations, orthopnea and leg swelling.   Gastrointestinal:  Negative for abdominal pain, heartburn, nausea and vomiting.   Genitourinary:  Negative for dysuria, frequency and urgency.   Musculoskeletal:  Negative for myalgias and neck pain.   Neurological:  Negative for dizziness, focal weakness and headaches.   Psychiatric/Behavioral:  Negative for depression.         Physical Exam  Temp:  [36.4 °C (97.6 °F)-37.2 °C (99 °F)] 36.6 °C (97.9 °F)  Pulse:  [73-95] 84  Resp:  [16-18] 16  BP: (105-139)/() 136/90  SpO2:  [94 %-100 %] 98 %    Physical Exam  Vitals and nursing note reviewed.   Constitutional:       General: She is not in acute distress.     Appearance: Normal appearance. She is not ill-appearing.   HENT:      Head: Normocephalic and atraumatic.      Mouth/Throat:      Mouth: Mucous membranes are moist.      Pharynx: No oropharyngeal exudate.      Comments: Dentition is generally in poor repair. Severe pain to palpation #30.   Eyes:      General: No scleral icterus.     Conjunctiva/sclera: Conjunctivae normal.   Cardiovascular:      Rate and Rhythm: Normal rate and regular rhythm.      Pulses: Normal pulses.      Heart sounds: Normal heart sounds.   Pulmonary:      Effort: Pulmonary effort is normal. No respiratory distress.      Breath sounds: Normal breath sounds. No wheezing.   Abdominal:      General: Bowel sounds are normal. There is no  distension.      Palpations: Abdomen is soft.      Tenderness: There is no abdominal tenderness.   Musculoskeletal:         General: No swelling or tenderness. Normal range of motion.   Skin:     General: Skin is warm and dry.      Capillary Refill: Capillary refill takes less than 2 seconds.   Neurological:      General: No focal deficit present.      Mental Status: She is alert and oriented to person, place, and time. Mental status is at baseline.   Psychiatric:         Mood and Affect: Mood normal.         Fluids  No intake or output data in the 24 hours ending 02/10/24 1610    Laboratory  Recent Labs     02/09/24  1720 02/10/24  0603   WBC 13.9* 8.0   RBC 4.31 3.92*   HEMOGLOBIN 13.6 12.5   HEMATOCRIT 43.1 36.6*   .0* 93.4   MCH 31.6 31.9   MCHC 31.6* 34.2   RDW 47.1 43.5   PLATELETCT 321 283   MPV 9.0 8.9*     Recent Labs     02/09/24  2005 02/10/24  0603   SODIUM 134* 134*   POTASSIUM 4.9 3.9   CHLORIDE 103 103   CO2 17* 19*   GLUCOSE 76 102*   BUN 7* 8   CREATININE 0.53 0.60   CALCIUM 9.3 8.5                   Imaging  [id number]   Final Result      1.  No mandible fracture.   2.  No tooth abscess demonstrated.      CT-MAXILLOFACIAL WITH PLUS RECONS   Final Result      1.  Inflammatory changes localized the RIGHT mandible extending into the superficial masseter muscle without discrete abscess.   2.  RIGHT parapharyngeal soft tissue edema also noted, without associated abscess.   3.  No tooth abscess demonstrated.           Assessment/Plan  * Dental infection- (present on admission)  Assessment & Plan  Ongoing since 12/23. CT on presentation showing inflammatory changes right mandible extending into superficial masseter muscle without discrete abscess. RIGHT parapharyngeal soft tissue edema also noted, without associated abscess. No tooth abscess demonstrated.     - Antibiotic therapy (patient allergic to penicillins, considering prior use of cephalosporins, will trial ceftriaxone with  metronidazole- discussed allergy with pharmacy)   - Optimize pain regimen with oral and IV pain meds    Dr. Bismark briscoe appreciated - Incision and drainage of buccal space abscess right side under general anesthesia  Plan for IV abx post procedure overnight.     Metabolic acidosis- (present on admission)  Assessment & Plan  Mild non anion gap metabolic acidosis on presentation with bicarb 19. Anion gap WNL   Vomiting the day of presentation   Observe for now     Hyponatremia- (present on admission)  Assessment & Plan  Mild, asymptomatic   Observe for now, can consider further work up if fails to improve with oral hydration (considering she had vomiting prior to presentation)     Posttraumatic stress disorder- (present on admission)  Assessment & Plan  - Cont home prazosin     Migraine without aura and without status migrainosus, not intractable- (present on admission)  Assessment & Plan  - Cont home lamotrigine   - Outpatient neurology follow up     Bipolar 1 disorder (HCC)- (present on admission)  Assessment & Plan  - Cont home olanzapine   - Established with psychiatry outpatient, appreciate help    Nightmares associated with chronic post-traumatic stress disorder- (present on admission)  Assessment & Plan  Establish with psychiatry outpatient  - Continue home prazosin  - Continue following up with psychiatry    Marijuana use- (present on admission)  Assessment & Plan  - Cessation counseling    Multiple sclerosis (HCC)- (present on admission)  Assessment & Plan  - Chronic condition, currently not on any outpatient medications  - Describes with previously following up with neurology, however has not seen them in more than 1 year now  - Outpatient follow-up with neurology         VTE prophylaxis:   SCDs/TEDs      I have performed a physical exam and reviewed and updated ROS and Plan today (2/10/2024).

## 2024-02-11 NOTE — CARE PLAN
The patient is Stable - Low risk of patient condition declining or worsening    Shift Goals  Clinical Goals: Pain management, IV ABX  Patient Goals: Pain reief, rest  Family Goals: Pt comfort    Progress made toward(s) clinical / shift goals:    Problem: Pain - Standard  Goal: Alleviation of pain or a reduction in pain to the patient’s comfort goal  Description: Target End Date:  Prior to discharge or change in level of care    Document on Vitals flowsheet    1.  Document pain using the appropriate pain scale per order or unit policy  2.  Educate and implement non-pharmacologic comfort measures (i.e. relaxation, distraction, massage, cold/heat therapy, etc.)  3.  Pain management medications as ordered  4.  Reassess pain after pain med administration per policy  5.  If opiods administered assess patient's response to pain medication is appropriate per POSS sedation scale  6.  Follow pain management plan developed in collaboration with patient and interdisciplinary team (including palliative care or pain specialists if applicable)  Outcome: Progressing     Problem: Knowledge Deficit - Standard  Goal: Patient and family/care givers will demonstrate understanding of plan of care, disease process/condition, diagnostic tests and medications  Description: Target End Date:  1-3 days or as soon as patient condition allows    Document in Patient Education    1.  Patient and family/caregiver oriented to unit, equipment, visitation policy and means for communicating concern  2.  Complete/review Learning Assessment  3.  Assess knowledge level of disease process/condition, treatment plan, diagnostic tests and medications  4.  Explain disease process/condition, treatment plan, diagnostic tests and medications  Outcome: Progressing       Patient is not progressing towards the following goals: N/A

## 2024-02-11 NOTE — PROGRESS NOTES
Discharge instructions given and discussed, signed copy in chart along with opiate consent form. Pt verbalized understanding and all questions answered. Yes. prescriptions delivered to bedside, discussed. Pt discharged home in stable condition on no O2 via WC escorted by transport. Personal belongings sent with patient. IV removed and tolerated well. No tele monitor. No further questions/concerns.

## 2024-02-11 NOTE — PROGRESS NOTES
This RN received report from SHIVANI Ortega. Assumed care of patient. Patient A&Ox 4, with normal WOB on RA. Patient c/o nausea at this time, mint scented pouch given to patient. Patient updated on plan of care, no further needs at this time. Bed in low and locked position, call light within reach.

## 2024-02-11 NOTE — DISCHARGE SUMMARY
Discharge Summary    CHIEF COMPLAINT ON ADMISSION  Chief Complaint   Patient presents with    Dental Pain     PATIENT WAS WAS SEEN THIS AM FOR A DENTAL ISSUE. PATIENT STATES SHE HAS BEEN ON ANTIBIOTICS SINCE THIS MONDAY. PT STATES PAIN ORIGINALLY BEGAN THE END OF DECEMBER. PT STATES SHE WAS AT [facility] AND DENTIST LOOKED AT TOOTH MANIPULATED IT AND THEN WOULD NOT DO ANY FURTHER PROCEDURES-PER PT PATIENT ALSO STATES SHE CAN NOW TASTE BLOOD AND PUS.        Reason for Admission  Dental abscess     Admission Date  2/9/2024    CODE STATUS  Full Code    HPI & HOSPITAL COURSE  Nanci Mccormack is a 34 y.o. female who presented 2/9/2024 with PMHx of bipolar disorder, PTSD, multiple sclerosis, migraines, prior hx of dental surgery & submandible abscess I&D 2020, who presents to ED with chief complaints of dental pain.      This has been ongoing since 12/23.  Described her right mandibular teeth swells up, forms an abscess which ultimately drains and she starts feeling better. Endorses having multiple such episodes since December last year.  Has tried outpatient antibiotic therapy with minimal relief.  Has been taking ibuprofen to help with pain with suboptimal response.     Described she was scheduled to see her dentist; however, she cannot see them until February which really concerned her.  She was recently seen in the ED 2/9 and sent home with clindamycin.  However, patient describes she developed fevers and chills after she got home.  Fever as high as 101.2 Fahrenheit.  She also had some nausea and vomiting.  She was really concerned so she came back to the ED.     Pt was admitted for further management.    2/10: Vitals wnl; afebrile. Pain scale reported - 7-8 in right jaw. At bedside, quite pleasant and she recounted the event leading to the hospital. Pt was taken to OR for Incision and drainage of buccal space abscess right side under general anesthesia. Plan for IV abx post procedure overnight.     2/11:  no acute events overnight. Pain adequately controlled. Tolerated AM diet. Deemed stable for DC with a close outpatient follow up.     Therefore, she is discharged in fair and stable condition to home with close outpatient follow-up.    The patient recovered much more quickly than anticipated on admission.    Discharge Date  02/11/24    FOLLOW UP ITEMS POST DISCHARGE  N/A    DISCHARGE DIAGNOSES  Principal Problem:    Dental infection (POA: Yes)  Active Problems:    Multiple sclerosis (HCC) (POA: Yes)     Marijuana use (POA: Yes)    Nightmares associated with chronic post-traumatic stress disorder (POA: Yes)      Bipolar 1 disorder (HCC) (POA: Yes)    Migraine without aura and without status migrainosus, not intractable (POA: Yes)    Posttraumatic stress disorder (POA: Yes)    Hyponatremia (POA: Yes)    Metabolic acidosis (POA: Yes)  Resolved Problems:    * No resolved hospital problems. *      FOLLOW UP  Future Appointments   Date Time Provider Department Center   2/29/2024  1:30 PM Pranav Clay M.D. [facility] [address] [facility]     No follow-up provider specified.    MEDICATIONS ON DISCHARGE     Medication List        START taking these medications        Instructions   azithromycin 250 MG Tabs  Start taking on: February 11, 2024  Commonly known as: Zithromax   Take 2 Tablets by mouth every day for 1 day, THEN 1 Tablet every day for 4 days.     metroNIDAZOLE 500 MG Tabs  Commonly known as: Flagyl   Take 1 Tablet by mouth every 12 hours for 7 days.  Dose: 500 mg     oxyCODONE-acetaminophen 5-325 MG Tabs  Commonly known as: Percocet   Take 1 Tablet by mouth every 8 hours as needed for Severe Pain for up to 3 days.  Dose: 1 Tablet            CHANGE how you take these medications        Instructions   LamoTRIgine 50 MG Tb24  What changed: how much to take   TAKE 1 TABLET BY MOUTH AT BEDTIME            CONTINUE taking these medications        Instructions   baclofen 10 MG Tabs  Commonly known as: Lioresal   Take 10 mg by mouth  one time as needed. Indications: Muscle Spasm  Dose: 10 mg     ibuprofen 200 MG Tabs  Commonly known as: Motrin   Take 800 mg by mouth one time as needed for Mild Pain. 800 mg = 4 tabs  Dose: 800 mg     OLANZapine 5 MG Tabs  Commonly known as: ZyPREXA   Take 1 Tablet by mouth every evening for 30 days.  Dose: 5 mg     ondansetron 4 MG Tbdp  Commonly known as: Zofran ODT   DISSOLVE 1 TABLET IN MOUTH EVERY 6 HOURS AS NEEDED FOR NAUSEA AND VOMITING     prazosin 1 MG Caps  Commonly known as: Minipress   Take 1 Capsule by mouth every evening.  Dose: 1 mg            STOP taking these medications      clindamycin 300 MG Caps  Commonly known as: Cleocin              Allergies  Allergies   Allergen Reactions    Pcn [Penicillins]      Hives  Pt unsure of reaction       DIET  Orders Placed This Encounter   Procedures    Diet Order Diet: Full Liquid     Standing Status:   Standing     Number of Occurrences:   1     Order Specific Question:   Diet:     Answer:   Full Liquid [11]       ACTIVITY  As tolerated.  Weight bearing as tolerated    CONSULTATIONS  OMFS    PROCEDURES  Incision and drainage of buccal space abscess right side under general anesthesia.    LABORATORY  Lab Results   Component Value Date    SODIUM 134 (L) 02/10/2024    POTASSIUM 3.9 02/10/2024    CHLORIDE 103 02/10/2024    CO2 19 (L) 02/10/2024    GLUCOSE 102 (H) 02/10/2024    BUN 8 02/10/2024    CREATININE 0.60 02/10/2024        Lab Results   Component Value Date    WBC 8.0 02/10/2024    HEMOGLOBIN 12.5 02/10/2024    HEMATOCRIT 36.6 (L) 02/10/2024    PLATELETCT 283 02/10/2024        Total time of the discharge process exceeds 36 minutes.

## 2024-02-11 NOTE — PROGRESS NOTES
Patient arrived back to unit from PACU. AxOx4, patient is anxious. VSS, on 2L of O2, respirations even and unlabored. Provided patient ginger ale to sip on for nausea. Call light within reach

## 2024-02-11 NOTE — ANESTHESIA POSTPROCEDURE EVALUATION
Patient: Nanci Mccormack    Procedure Summary       Date: 02/10/24 Room / Location: [facility] 08 / SURGERY [facility]    Anesthesia Start: 1609 Anesthesia Stop: 1651    Procedures:       INCISION AND DRAINAGE, BUCCAL SPACE ABCESS (Neck)      EXTRACTION, TOOTH #35 (Mouth) Diagnosis: (Abscess tooth #35)    Surgeons: Tony Sofia DMD, M.D. Responsible Provider: Caleb Zepeda M.D.    Anesthesia Type: general ASA Status: 2            Final Anesthesia Type: general  Last vitals  BP   Blood Pressure: 131/87    Temp   36.6 °C (97.8 °F)    Pulse   76   Resp   15    SpO2   100 %      Anesthesia Post Evaluation    Patient location during evaluation: PACU  Patient participation: complete - patient participated  Level of consciousness: awake and alert  Pain score: 2    Airway patency: patent  Anesthetic complications: no  Cardiovascular status: hemodynamically stable  Respiratory status: acceptable  Hydration status: euvolemic    PONV: none        No notable events documented.     Nurse Pain Score: 7 (NPRS)

## 2024-02-11 NOTE — ANESTHESIA PREPROCEDURE EVALUATION
Case: [id number] Date/Time: 02/10/24 1325    Procedures:       INCISION AND DRAINAGE, NECK ABCESS (Neck)      EXTRACTION, TOOTH (Mouth)    Anesthesia type: General    Location: [place] [address] / SURGERY [facility]    Surgeons: Tony Sofia DMD, M.D.            Relevant Problems   NEURO   (positive) Migraine without aura and without status migrainosus, not intractable      CARDIAC   (positive) Migraine without aura and without status migrainosus, not intractable       Physical Exam    Airway   Mallampati: II  TM distance: >3 FB  Neck ROM: full       Cardiovascular - normal exam  Rhythm: regular  Rate: normal  (-) murmur     Dental - normal exam           Pulmonary - normal exam  Breath sounds clear to auscultation     Abdominal    Neurological - normal exam               Anesthesia Plan    ASA 2       Plan - general       Airway plan will be ETT          Induction: intravenous    Postoperative Plan: Postoperative administration of opioids is intended.    Pertinent diagnostic labs and testing reviewed    Informed Consent:    Anesthetic plan and risks discussed with patient.    Use of blood products discussed with: patient whom consented to blood products.

## 2024-02-11 NOTE — OR NURSING
1647  Patient arrived from OR, oral airway in place. Received report from Dr. Zepeda and Kiana TEAGUE RN, assumed care. VSS. No drainage from mouth noted. Patient appears comfortable, no s/s of pain or nausea noted. Orders reviewed and implemented. 1703 Rouses to voice, oral airway removed. Gauze placed at extraction site. 1705 Oriented x 4, denies pain or nausea.  1711 Oxycodone and Fentanyl administered for reported pain.  1716 Zofran administered for nausea.  1728 Haldol administered for continued report of nausea.  1745 Patient resting comfortably, reports pain improved and tolerable. Nausea resolved. VSS.  1755 Report to Shannon BROWN RN.  1807  Patient discharged back to room with oxygen and one bag of belongings.

## 2024-02-11 NOTE — ANESTHESIA PROCEDURE NOTES
Airway    Date/Time: 2/10/2024 4:17 PM    Performed by: Caleb Zepeda M.D.  Authorized by: Caleb Zepeda M.D.    Location:  OR  Urgency:  Elective  Difficult Airway: No    Indications for Airway Management:  Anesthesia      Spontaneous Ventilation: absent    Sedation Level:  Deep  Preoxygenated: Yes    Patient Position:  Sniffing  Final Airway Type:  Endotracheal airway  Final Endotracheal Airway:  ETT  Cuffed: Yes    Technique Used for Successful ETT Placement:  Direct laryngoscopy    Insertion Site:  Oral  Blade Type:  Anya  Laryngoscope Blade/Videolaryngoscope Blade Size:  3  ETT Size (mm):  7.5  Measured from:  Lips  ETT to Lips (cm):  21  Placement Verified by: auscultation and capnometry    Cormack-Lehane Classification:  Grade IIa - partial view of glottis  Number of Attempts at Approach:  1  Number of Other Approaches Attempted:  0

## 2024-02-12 ENCOUNTER — PATIENT OUTREACH (OUTPATIENT)
Dept: MEDICAL GROUP | Facility: MEDICAL CENTER | Age: 34
End: 2024-02-12
Payer: MEDICARE

## 2024-02-12 NOTE — PROGRESS NOTES
Transitional Care Management  TCM Outreach Date and Time: Filed (2/12/2024  8:59 AM)    Discharge Questions  Actual Discharge Date: 02/11/24  Now that you are home, how are you feeling?: Very Good (pt feeling much better, taking medications as prescribed. pt does not feel she needs to be seen by pcp at this time but will call to schedule if needed. er/uc precautions advised.)  Did you receive any new prescriptions?: Yes  Were you able to get them filled?: Yes  Meds to Bed or Pharmacy filled?: Meds to Bed  Do you have any questions about your current medications or new medications (Review Med Rec)?: No  Did you have any durable medical equipment ordered?: No  Do you have a follow up appointment scheduled with your PCP?: No  Was an appointment scheduled for the patient?: No  Any issues or paperwork you wish to discuss with your PCP?: No  Reason not scheduled?: Declines  If Home Health was ordered, have they contacted you (Patient): Not Applicable  Did you have enough support after your last discharge?: Yes  Does this patient qualify for the CCM program?: No (does not have 2 qualifying dx)    Transitional Care  Number of attempts made to contact patient: 1  Current or previous attempts completed within two business days of discharge? : Yes  Provided education regarding treatment plan, medications, self-management, ADLs?: Yes  Has patient completed an Advanced Directive?: No  Has the Care Manager's phone number provided?: No  Is there anything else I can help you with?: No    Discharge Summary  Chief Complaint: Dental Pain - Patient was seen this AM for a dental issue. Pt states she has been on antibiotics since this Monday. Pt states pain originally began the end of December. Pt states she was at [facility] and dentist looked at tooth manipulated it and then would not do any further procedures - per pt also states she can now taste blood and pus.  Admitting Diagnosis: dental pain  Discharge Diagnosis: dental  infection

## 2024-02-29 ENCOUNTER — APPOINTMENT (OUTPATIENT)
Dept: BEHAVIORAL HEALTH | Facility: CLINIC | Age: 34
End: 2024-02-29
Payer: MEDICARE

## 2024-03-06 ENCOUNTER — OFFICE VISIT (OUTPATIENT)
Dept: BEHAVIORAL HEALTH | Facility: CLINIC | Age: 34
End: 2024-03-06
Payer: MEDICARE

## 2024-03-06 DIAGNOSIS — F43.10 POSTTRAUMATIC STRESS DISORDER: ICD-10-CM

## 2024-03-06 DIAGNOSIS — F31.9 BIPOLAR 1 DISORDER (HCC): ICD-10-CM

## 2024-03-06 DIAGNOSIS — F90.2 ATTENTION DEFICIT HYPERACTIVITY DISORDER, COMBINED TYPE: ICD-10-CM

## 2024-03-06 PROCEDURE — 99214 OFFICE O/P EST MOD 30 MIN: CPT | Performed by: PSYCHIATRY & NEUROLOGY

## 2024-03-06 RX ORDER — OLANZAPINE 10 MG/1
10 TABLET ORAL NIGHTLY
Qty: 30 TABLET | Refills: 0 | Status: SHIPPED | OUTPATIENT
Start: 2024-03-06 | End: 2024-04-05

## 2024-03-06 NOTE — PROGRESS NOTES
[facility]   Follow Up Assessment     This provider informed the patient their medical records are totally confidential except for the use by other providers involved in their care, or if the patient signs a release, or to report instances of child or elder abuse, or if it is determined they are an immediate risk to harm themselves or others.    Name: Nanci Mccormack  MRN: [id number]  : 1990  Age: 34 y.o.  Date of assessment: 3/6/2024  PCP: Isabel Medrano M.D.      Subjective:  Chart reviewed prior to seeing her in my office.  She was seen most recently on .  She indicates that Zyprexa 5 mg at bedtime is helping to stabilize her moods. She feels a little less paranoid and is not experiencing auditory or visual hallucinations.  We discussed dose options for Zyprexa.  She would like to increase Zyprexa to 10 mg at bedtime.   We reviewed her symptoms of ADHD.  It was never diagnosed.  She might be a candidate for Strattera, Ritalin, or Adderall?    Objective:  She is alert, oriented, and cooperative.  Relatedness is good.  Grooming is good.  Speech is normal rate.  Less anxious.  Memory is good.  Insight and judgment are fairly good.  No indication of psychotic thinking.    Current Risk:       Suicidal: Not suicidal       Homicidal: Not homicidal       Self-Harm: No plan to harm self       Relapse: (Low/Moderate/High): Moderate       Crisis Safety Plan Reviewed: Discussed with patient    Diagnosis:   Bipolar disorder, depressed  Rule out schizoaffective disorder  PTSD  ADHD    Treatment Plan:  The current treatment plan consists of a follow-up visit in 3 weeks and then monthly psychiatric sessions designed to evaluate her bipolar disorder, possible schizoaffective disorder, PTSD, and ADHD.    Duration will be for a minimum of 12 months and will be reviewed at each visit.    Goals: Stabilization of moods with improvement in ADHD symptoms in order to prevent relapse due to the  chronic nature of her behavioral health problems and mental illness.  Increase Zyprexa to 10 mg at bedtime.  She might be a candidate for Strattera, Ritalin, or Adderall for ADHD symptoms.    Pranav Clay M.D.      This note was created using voice recognition software (Dragon). The accuracy of the dictation is limited by the abilities of the software. I have reviewed the note prior to signing, however some errors in grammar and context are still possible. If you have any questions related to this note please do not hesitate to contact our office.

## 2024-03-07 ENCOUNTER — OFFICE VISIT (OUTPATIENT)
Dept: BEHAVIORAL HEALTH | Facility: CLINIC | Age: 34
End: 2024-03-07
Payer: MEDICARE

## 2024-03-07 DIAGNOSIS — F31.9 BIPOLAR 1 DISORDER (HCC): ICD-10-CM

## 2024-03-07 DIAGNOSIS — F90.2 ATTENTION DEFICIT HYPERACTIVITY DISORDER, COMBINED TYPE: ICD-10-CM

## 2024-03-07 DIAGNOSIS — F43.10 POSTTRAUMATIC STRESS DISORDER: ICD-10-CM

## 2024-03-07 PROCEDURE — 90791 PSYCH DIAGNOSTIC EVALUATION: CPT
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
## 2024-03-08 ENCOUNTER — TELEPHONE (OUTPATIENT)
Dept: MEDICAL GROUP | Facility: MEDICAL CENTER | Age: 34
End: 2024-03-08
Payer: MEDICARE

## 2024-03-08 ASSESSMENT — PATIENT HEALTH QUESTIONNAIRE - PHQ9
SUM OF ALL RESPONSES TO PHQ QUESTIONS 1-9: 23
CLINICAL INTERPRETATION OF PHQ2 SCORE: 6
5. POOR APPETITE OR OVEREATING: 2 - MORE THAN HALF THE DAYS

## 2024-03-08 ASSESSMENT — ANXIETY QUESTIONNAIRES
3. WORRYING TOO MUCH ABOUT DIFFERENT THINGS: NEARLY EVERY DAY
IF YOU CHECKED OFF ANY PROBLEMS ON THIS QUESTIONNAIRE, HOW DIFFICULT HAVE THESE PROBLEMS MADE IT FOR YOU TO DO YOUR WORK, TAKE CARE OF THINGS AT HOME, OR GET ALONG WITH OTHER PEOPLE: EXTREMELY DIFFICULT
6. BECOMING EASILY ANNOYED OR IRRITABLE: NEARLY EVERY DAY
1. FEELING NERVOUS, ANXIOUS, OR ON EDGE: NEARLY EVERY DAY
7. FEELING AFRAID AS IF SOMETHING AWFUL MIGHT HAPPEN: MORE THAN HALF THE DAYS
GAD7 TOTAL SCORE: 20
4. TROUBLE RELAXING: NEARLY EVERY DAY
5. BEING SO RESTLESS THAT IT IS HARD TO SIT STILL: NEARLY EVERY DAY
2. NOT BEING ABLE TO STOP OR CONTROL WORRYING: NEARLY EVERY DAY

## 2024-03-08 NOTE — TELEPHONE ENCOUNTER
DOCUMENTATION OF PAR STATUS:    1. Name of Medication & Dose: lamoTRIgine ER 50MG er tablets     2. Name of Prescription Coverage Company & phone #: [email or web address]    3. Date Prior Auth Submitted: 3/8/24    4. What information was given to obtain insurance decision? Clinical office notes    5. Prior Auth Status? Pending    6. Patient Notified: N\A

## 2024-03-18 ENCOUNTER — PHARMACY VISIT (OUTPATIENT)
Dept: PHARMACY | Facility: MEDICAL CENTER | Age: 34
End: 2024-03-18

## 2024-03-18 ENCOUNTER — APPOINTMENT (OUTPATIENT)
Dept: BEHAVIORAL HEALTH | Facility: CLINIC | Age: 34
End: 2024-03-18
Payer: MEDICARE

## 2024-03-27 ENCOUNTER — APPOINTMENT (OUTPATIENT)
Dept: BEHAVIORAL HEALTH | Facility: CLINIC | Age: 34
End: 2024-03-27
Payer: MEDICARE

## 2024-04-01 ENCOUNTER — APPOINTMENT (OUTPATIENT)
Dept: BEHAVIORAL HEALTH | Facility: CLINIC | Age: 34
End: 2024-04-01
Payer: MEDICARE

## 2024-04-01 DIAGNOSIS — F43.10 POSTTRAUMATIC STRESS DISORDER: ICD-10-CM

## 2024-04-01 DIAGNOSIS — F31.9 BIPOLAR 1 DISORDER (HCC): ICD-10-CM

## 2024-04-01 DIAGNOSIS — F90.2 ATTENTION DEFICIT HYPERACTIVITY DISORDER, COMBINED TYPE: ICD-10-CM

## 2024-04-01 PROCEDURE — 99214 OFFICE O/P EST MOD 30 MIN: CPT | Performed by: PSYCHIATRY & NEUROLOGY

## 2024-04-01 RX ORDER — OLANZAPINE 10 MG/1
10 TABLET ORAL NIGHTLY
Qty: 90 TABLET | Refills: 0 | Status: SHIPPED | OUTPATIENT
Start: 2024-04-01 | End: 2024-04-19 | Stop reason: SDUPTHER

## 2024-04-01 RX ORDER — ATOMOXETINE 25 MG/1
25 CAPSULE ORAL DAILY
Qty: 30 CAPSULE | Refills: 0 | Status: SHIPPED | OUTPATIENT
Start: 2024-04-01 | End: 2024-05-01

## 2024-04-01 NOTE — PROGRESS NOTES
[facility]   Follow Up Assessment     This provider informed the patient their medical records are totally confidential except for the use by other providers involved in their care, or if the patient signs a release, or to report instances of child or elder abuse, or if it is determined they are an immediate risk to harm themselves or others.    Name: Nanci Mccormack  MRN: [id number]  : 1990  Age: 34 y.o.  Date of assessment: 2024  PCP: Isabel Medrano M.D.      Subjective:  Chart reviewed prior to seeing her in my office.  She was seen most recently on .  Increasing Zyprexa to 10 mg at bedtime has worked out well.  She is no longer experiencing any auditory or visual hallucinations and has been able to gain 6 pounds.  She has been told that she will be promoted at work soon.  She would like treatment for ADHD.  She is agreeable to starting Strattera 25 mg a.m.    Objective:  She is alert, oriented, and cooperative.  Relatedness is good.  Grooming is good.  Speech is normal rate.  Less anxious.  Memory is good.  Insight and judgment are fairly good.  No indication of psychotic thinking.    Current Risk:       Suicidal: Not suicidal       Homicidal: Not homicidal       Self-Harm: No plan to harm self       Relapse: (Low/Moderate/High): Moderate       Crisis Safety Plan Reviewed: Discussed with patient    Diagnosis:   Bipolar 1 disorder  Rule out schizoaffective disorder  PTSD  ADHD    Treatment Plan:  The current treatment plan consists of monthly psychiatric sessions designed to evaluate her bipolar disorder and ADHD.    Duration will be for a minimum of 12 months and will be reviewed at each visit.    Goals: Stabilization of moods with elimination of auditory and visual hallucinations and improvement in ADHD symptoms in order to prevent relapse due to the chronic nature of her behavioral health problems and mental illness.  Continue Zyprexa 10 mg at bedtime.  Start Strattera  25 mg a.m.  Possible side effects including headaches discussed.    Pranav Clay M.D.      This note was created using voice recognition software (Dragon). The accuracy of the dictation is limited by the abilities of the software. I have reviewed the note prior to signing, however some errors in grammar and context are still possible. If you have any questions related to this note please do not hesitate to contact our office.

## 2024-04-03 ENCOUNTER — OFFICE VISIT (OUTPATIENT)
Dept: BEHAVIORAL HEALTH | Facility: CLINIC | Age: 34
End: 2024-04-03
Payer: MEDICARE

## 2024-04-03 DIAGNOSIS — F43.10 POSTTRAUMATIC STRESS DISORDER: ICD-10-CM

## 2024-04-03 DIAGNOSIS — F31.9 BIPOLAR 1 DISORDER (HCC): ICD-10-CM

## 2024-04-03 DIAGNOSIS — F90.2 ATTENTION DEFICIT HYPERACTIVITY DISORDER, COMBINED TYPE: ICD-10-CM

## 2024-04-03 PROCEDURE — 90834 PSYTX W PT 45 MINUTES: CPT

## 2024-04-03 NOTE — PROGRESS NOTES
"[facility]   Therapy Progress Note      Therapy was provided on this date in coordination with the [facility] approved Clinical Supervisor under the direct supervision of  who was on site during this visit.    Therapist reviewed informed consent, limits of confidentiality and [facility] policies; patient expressed understanding and agreed to voluntarily proceed with evaluation and treatment.    Name: Nanci Mccormack  MRN: [id number]  : 1990  Age: 34 y.o.  Date of assessment: 4/3/2024  PCP: Isabel Medrano M.D.  Persons in attendance: Patient   Total session time: 50 minutes      Topics addressed in psychotherapy include:     Data: Client reports having a difficult time at work, client expressed hating her current job due to lack of \"doing something.\" Client expressed wanting to quit but feel hesitant as \"I am not a quitter,\" client reports she has been \"trying to get fired.\" Client reports currently staying at her job to get money to leave \"the father of my kids because he is a loser.\"    Assessment: Client and therapist discussed client wanting quit, client expressed \"Im basically being paid to do nothing I might as well just spend my time at home.\"  Client reports being disrespectful to her coworkers and managers and showing up late to work in hopes of being fired. Therapist discussed with client thoughts of putting 2 weeks in, client expressed feeling as she cannot quit due to \"I'm not a quitter and would rather have them fire me.\" Client and therapist discussed client motive on staying at her job, client staying due to campos more money in hopes of leaving her kids father. Client and therapist discussed client relationship with her kids father, client expressed feeling as she has been taking care of him all her life.   Plan: Therapist offered supportive psychotherapy.     Objective Observations:   Participation:Active verbal participation, Attentive, and " Engaged   Grooming:Casual and Neat   Cognition:Alert and Fully Oriented   Eye Contact:Good   Mood:Irritable   Affect:Congruent with content   Thought Process:Logical   Speech:Rate within normal limits and Volume within normal limits    Current Risk:   Suicide:  Not indicated   Homicide:  Not indicated   Self-Harm:  Not indicated    Relapse:  Not indicated    Safety Plan Reviewed: not applicable        Diagnosis:  1. Bipolar 1 disorder (HCC)    2. Attention deficit hyperactivity disorder, combined type    3. Posttraumatic stress disorder            Mayra Sweeney LMSW, CSW-Intern

## 2024-04-11 NOTE — CARE PLAN
Problem: Safety  Goal: Will remain free from injury  Outcome: PROGRESSING AS EXPECTED     Problem: Infection  Goal: Will remain free from infection  Outcome: PROGRESSING AS EXPECTED  Note:   IV abx as ordered     Problem: Pain Management  Goal: Pain level will decrease to patient's comfort goal  Outcome: PROGRESSING AS EXPECTED  Note:   Severe pain post op requiring frequent morphine doses. Pain tolerable since 0300. Denies need for meds at this time.       11-Apr-2024

## 2024-04-15 ENCOUNTER — APPOINTMENT (OUTPATIENT)
Dept: BEHAVIORAL HEALTH | Facility: CLINIC | Age: 34
End: 2024-04-15
Payer: MEDICARE

## 2024-04-19 RX ORDER — OLANZAPINE 10 MG/1
10 TABLET ORAL NIGHTLY
Qty: 90 TABLET | Refills: 0 | Status: SHIPPED | OUTPATIENT
Start: 2024-04-19 | End: 2024-07-18

## 2024-04-19 NOTE — TELEPHONE ENCOUNTER
Received request via: Patient    Was the patient seen in the last year in this department? Yes    Does the patient have an active prescription (recently filled or refills available) for medication(s) requested? No    Does the patient have half-way Plus and need 100 day supply (blood pressure, diabetes and cholesterol meds only)? Medication is not for cholesterol, blood pressure or diabetes and Patient does not have SCP

## 2024-05-01 ENCOUNTER — APPOINTMENT (OUTPATIENT)
Dept: BEHAVIORAL HEALTH | Facility: CLINIC | Age: 34
End: 2024-05-01
Payer: MEDICARE

## 2024-05-06 ENCOUNTER — APPOINTMENT (OUTPATIENT)
Dept: MEDICAL GROUP | Facility: MEDICAL CENTER | Age: 34
End: 2024-05-06
Payer: MEDICARE

## 2024-05-08 ENCOUNTER — APPOINTMENT (OUTPATIENT)
Dept: BEHAVIORAL HEALTH | Facility: CLINIC | Age: 34
End: 2024-05-08
Payer: MEDICARE

## 2024-05-17 ENCOUNTER — APPOINTMENT (OUTPATIENT)
Dept: BEHAVIORAL HEALTH | Facility: CLINIC | Age: 34
End: 2024-05-17
Payer: MEDICARE

## 2024-05-20 ENCOUNTER — APPOINTMENT (OUTPATIENT)
Dept: BEHAVIORAL HEALTH | Facility: CLINIC | Age: 34
End: 2024-05-20
Payer: MEDICARE

## 2024-05-31 ENCOUNTER — APPOINTMENT (OUTPATIENT)
Dept: BEHAVIORAL HEALTH | Facility: CLINIC | Age: 34
End: 2024-05-31
Payer: MEDICARE

## 2024-06-12 ENCOUNTER — APPOINTMENT (OUTPATIENT)
Dept: BEHAVIORAL HEALTH | Facility: CLINIC | Age: 34
End: 2024-06-12
Payer: MEDICARE

## 2024-07-15 ENCOUNTER — TELEMEDICINE (OUTPATIENT)
Dept: BEHAVIORAL HEALTH | Facility: CLINIC | Age: 34
End: 2024-07-15
Payer: MEDICARE

## 2024-07-15 DIAGNOSIS — F43.10 POSTTRAUMATIC STRESS DISORDER: ICD-10-CM

## 2024-07-15 DIAGNOSIS — F31.9 BIPOLAR 1 DISORDER (HCC): ICD-10-CM

## 2024-07-15 DIAGNOSIS — F90.2 ATTENTION DEFICIT HYPERACTIVITY DISORDER, COMBINED TYPE: ICD-10-CM

## 2024-07-15 PROCEDURE — 90834 PSYTX W PT 45 MINUTES: CPT

## 2024-07-27 DIAGNOSIS — R11.2 NON-INTRACTABLE VOMITING WITH NAUSEA: ICD-10-CM

## 2024-07-29 RX ORDER — ONDANSETRON 4 MG/1
TABLET, ORALLY DISINTEGRATING ORAL
Qty: 30 TABLET | Refills: 0 | Status: SHIPPED | OUTPATIENT
Start: 2024-07-29

## 2024-08-06 ENCOUNTER — APPOINTMENT (OUTPATIENT)
Dept: MEDICAL GROUP | Facility: MEDICAL CENTER | Age: 34
End: 2024-08-06
Payer: MEDICARE

## 2024-08-20 ENCOUNTER — OFFICE VISIT (OUTPATIENT)
Dept: BEHAVIORAL HEALTH | Facility: CLINIC | Age: 34
End: 2024-08-20
Payer: MEDICARE

## 2024-08-20 DIAGNOSIS — F43.10 POSTTRAUMATIC STRESS DISORDER: ICD-10-CM

## 2024-08-20 DIAGNOSIS — F31.9 BIPOLAR 1 DISORDER (HCC): ICD-10-CM

## 2024-08-20 DIAGNOSIS — F90.2 ATTENTION DEFICIT HYPERACTIVITY DISORDER, COMBINED TYPE: ICD-10-CM

## 2024-08-20 PROCEDURE — 90832 PSYTX W PT 30 MINUTES: CPT

## 2024-08-20 NOTE — PROGRESS NOTES
"[facility]   Therapy Progress Note      Therapy was provided on this date in coordination with the [facility] approved Clinical Supervisor under the direct supervision of  who was on site during this visit.    Therapist reviewed informed consent, limits of confidentiality and [facility] policies; patient expressed understanding and agreed to voluntarily proceed with evaluation and treatment.    Name: Nanci Mccormack  MRN: [id number]  : 1990  Age: 34 y.o.  Date of assessment: 2024  PCP: Isabel Medrano M.D.  Persons in attendance: Patient   Total session time: 35 minutes      Topics addressed in psychotherapy include:     Data: Client reports feeling stressed and \"done with\" over work. Client reports work has been causing more stress for her due to leaders not being understanding and only relying on client to help out.    Assessment: Client used the session session for emotional decompression. Client and therapist discussed client feeling stressed, client expressed feeling as her work has become \"problematic.\" Client expressed she will often feel the need to call out due to not being able to handle the job \"mentally.\" Client would like to stay at her job but does not want to handle her coworkers. Client and therapist discussed communicating to HR, client would like FMLA. Therapist discussed with client FMLA paperwork could be completed by her PCP or psychiatrist, client was receptive.     Plan:  Therapist offered supportive psychotherapy. Therapist will continue to monitor client's emotions.     Objective Observations:   Participation:Active verbal participation, Attentive, and Engaged   Grooming:Casual and Neat   Cognition:Alert   Eye Contact:Good   Mood:Irritable   Affect:Congruent with content   Thought Process:Logical   Speech:Rate within normal limits and Volume within normal limits    Current Risk:   Suicide:  Not indicated   Homicide:  Not " indicated   Self-Harm:  Not indicated    Relapse:  Not indicated    Safety Plan Reviewed: not applicable        Diagnosis:  No diagnosis found.        Mayra Sweeney LMSW, CSW-Intern

## 2024-08-21 ENCOUNTER — OFFICE VISIT (OUTPATIENT)
Dept: BEHAVIORAL HEALTH | Facility: CLINIC | Age: 34
End: 2024-08-21
Payer: MEDICARE

## 2024-08-21 DIAGNOSIS — F43.10 POSTTRAUMATIC STRESS DISORDER: ICD-10-CM

## 2024-08-21 DIAGNOSIS — F90.2 ATTENTION DEFICIT HYPERACTIVITY DISORDER, COMBINED TYPE: ICD-10-CM

## 2024-08-21 DIAGNOSIS — F31.9 BIPOLAR 1 DISORDER (HCC): ICD-10-CM

## 2024-08-21 PROCEDURE — 99214 OFFICE O/P EST MOD 30 MIN: CPT | Performed by: PSYCHIATRY & NEUROLOGY

## 2024-08-21 RX ORDER — OLANZAPINE 10 MG/1
10 TABLET ORAL NIGHTLY
Qty: 90 TABLET | Refills: 0 | Status: SHIPPED | OUTPATIENT
Start: 2024-08-21 | End: 2024-11-19

## 2024-08-21 RX ORDER — DEXTROAMPHETAMINE SACCHARATE, AMPHETAMINE ASPARTATE, DEXTROAMPHETAMINE SULFATE AND AMPHETAMINE SULFATE 1.25; 1.25; 1.25; 1.25 MG/1; MG/1; MG/1; MG/1
5 TABLET ORAL 3 TIMES DAILY
Qty: 90 TABLET | Refills: 0 | Status: SHIPPED | OUTPATIENT
Start: 2024-08-21 | End: 2024-08-26

## 2024-08-21 NOTE — PROGRESS NOTES
[facility]   Follow Up Assessment     This provider informed the patient their medical records are totally confidential except for the use by other providers involved in their care, or if the patient signs a release, or to report instances of child or elder abuse, or if it is determined they are an immediate risk to harm themselves or others.    Name: Nanci Mccormack  MRN: [id number]  : 1990  Age: 34 y.o.  Date of assessment: 2024  PCP: Isabel Medrano M.D.      Subjective:  Chart reviewed prior to seeing her in my office.  She was last seen on .  Her coworkers are very difficult for her to deal with.  Her job is stressful but she likes it.  She may seek a temporary leave of absence.  We reviewed her medications, purposes, doses, etc.  Zyprexa 10 mg at bedtime is preventing any auditory or visual hallucinations.  Terra 25 mg a.m. caused headaches and did not help her to focus.  We talked about treatment options for ADHD.    Objective:  She is alert, oriented, and cooperative.  Relatedness is good.  Grooming is good.  Tearful during the interview.  Memory is good.  Insight and judgment are fairly good.  No indication of psychotic thinking.    Current Risk:       Suicidal: Not suicidal       Homicidal: Not homicidal       Self-Harm: No plan to harm self       Relapse: (Low/Moderate/High): Moderate       Crisis Safety Plan Reviewed: Discussed with patient    Diagnosis:   Bipolar 1 disorder  Rule out schizoaffective disorder  PTSD  ADHD    Treatment Plan:  The current treatment plan consists of a follow-up in 3 weeks and then monthly psychiatric sessions designed to evaluate her bipolar disorder, PTSD and ADHD.    Duration will be for a minimum of 12 months and will be reviewed at each visit.    Goals: Stabilization of moods with improvement in ADHD symptoms in order to prevent relapse due to the chronic nature of her behavioral health problems and mental illness.  Continue  Zyprexa 10 mg at bedtime.  Strattera 25 mg a.m. has been discontinued.  Start Adderall 5 mg 3 times daily.  Possible side effects discussed.    Pranav Clay M.D.      This note was created using voice recognition software (Dragon). The accuracy of the dictation is limited by the abilities of the software. I have reviewed the note prior to signing, however some errors in grammar and context are still possible. If you have any questions related to this note please do not hesitate to contact our office.

## 2024-08-26 ENCOUNTER — PATIENT MESSAGE (OUTPATIENT)
Dept: BEHAVIORAL HEALTH | Facility: CLINIC | Age: 34
End: 2024-08-26
Payer: MEDICARE

## 2024-08-26 ENCOUNTER — PATIENT MESSAGE (OUTPATIENT)
Dept: MEDICAL GROUP | Facility: MEDICAL CENTER | Age: 34
End: 2024-08-26
Payer: MEDICARE

## 2024-08-26 DIAGNOSIS — F90.2 ATTENTION DEFICIT HYPERACTIVITY DISORDER, COMBINED TYPE: ICD-10-CM

## 2024-08-26 RX ORDER — DEXTROAMPHETAMINE SACCHARATE, AMPHETAMINE ASPARTATE, DEXTROAMPHETAMINE SULFATE AND AMPHETAMINE SULFATE 1.25; 1.25; 1.25; 1.25 MG/1; MG/1; MG/1; MG/1
5 TABLET ORAL 2 TIMES DAILY
Qty: 60 TABLET | Refills: 0 | Status: SHIPPED | OUTPATIENT
Start: 2024-08-26 | End: 2024-09-25

## 2024-08-27 ENCOUNTER — OFFICE VISIT (OUTPATIENT)
Dept: BEHAVIORAL HEALTH | Facility: CLINIC | Age: 34
End: 2024-08-27
Payer: MEDICARE

## 2024-08-27 DIAGNOSIS — F90.2 ATTENTION DEFICIT HYPERACTIVITY DISORDER, COMBINED TYPE: ICD-10-CM

## 2024-08-27 DIAGNOSIS — F31.9 BIPOLAR 1 DISORDER (HCC): ICD-10-CM

## 2024-08-27 PROCEDURE — 99214 OFFICE O/P EST MOD 30 MIN: CPT | Performed by: PSYCHIATRY & NEUROLOGY

## 2024-08-27 NOTE — PROGRESS NOTES
[facility]   Follow Up Assessment     This provider informed the patient their medical records are totally confidential except for the use by other providers involved in their care, or if the patient signs a release, or to report instances of child or elder abuse, or if it is determined they are an immediate risk to harm themselves or others.    Name: Nanci Mccormack  MRN: [id number]  : 1990  Age: 34 y.o.  Date of assessment: 2024  PCP: Isabel Medrano M.D.      Subjective: Chart reviewed prior to seeing her in my office.  She was seen most recently on  and needed a complicated form for her employer completed.  We reviewed her current medication combination.  She indicates that Zyprexa 10 mg at bedtime is working well for her bipolar disorder.  Her insurance company apparently will not approve Adderall 5 mg 3 times a day but will approve Adderall 5 mg twice daily.    Objective:  She is alert, oriented, and cooperative.  Relatedness is good.  Grooming is good.  Speech is normal rate.  Anxious.  Memory is good.  Insight and judgment are fairly good.  No indication of psychotic thinking.    Current Risk:       Suicidal: Not suicidal       Homicidal: Not homicidal       Self-Harm: No plan to harm self       Relapse: (Low/Moderate/High): Moderate       Crisis Safety Plan Reviewed: Discussed with patient    Diagnosis:   Bipolar 1 disorder  Rule out schizoaffective disorder  PTSD  ADHD    Treatment Plan:  The current treatment plan consists of monthly psychiatric sessions designed to evaluate her bipolar disorder and ADHD.    Duration will be for a minimum of 12 months and will be reviewed at each visit.    Goals: Stabilization of moods with improvement in ADHD symptoms in order to prevent relapse due to the chronic nature of her behavioral health problems and mental illness.  Continue Zyprexa 10 mg at bedtime.  Change Adderall to 5 mg twice daily but the dose will need to be  adjusted.    Pranav Clay M.D.      This note was created using voice recognition software (Dragon). The accuracy of the dictation is limited by the abilities of the software. I have reviewed the note prior to signing, however some errors in grammar and context are still possible. If you have any questions related to this note please do not hesitate to contact our office.

## 2024-09-17 ENCOUNTER — APPOINTMENT (OUTPATIENT)
Dept: BEHAVIORAL HEALTH | Facility: CLINIC | Age: 34
End: 2024-09-17
Payer: MEDICARE

## 2024-10-15 ENCOUNTER — OFFICE VISIT (OUTPATIENT)
Dept: BEHAVIORAL HEALTH | Facility: CLINIC | Age: 34
End: 2024-10-15
Payer: MEDICARE

## 2024-10-15 DIAGNOSIS — F43.10 POSTTRAUMATIC STRESS DISORDER: ICD-10-CM

## 2024-10-15 DIAGNOSIS — F31.9 BIPOLAR 1 DISORDER (HCC): ICD-10-CM

## 2024-10-15 DIAGNOSIS — F90.2 ATTENTION DEFICIT HYPERACTIVITY DISORDER, COMBINED TYPE: ICD-10-CM

## 2024-10-15 PROCEDURE — 90832 PSYTX W PT 30 MINUTES: CPT

## 2024-10-17 DIAGNOSIS — R11.2 NON-INTRACTABLE VOMITING WITH NAUSEA: ICD-10-CM

## 2024-10-17 RX ORDER — ONDANSETRON 4 MG/1
TABLET, ORALLY DISINTEGRATING ORAL
Qty: 30 TABLET | Refills: 0 | Status: SHIPPED | OUTPATIENT
Start: 2024-10-17

## 2024-10-29 ENCOUNTER — APPOINTMENT (OUTPATIENT)
Dept: BEHAVIORAL HEALTH | Facility: CLINIC | Age: 34
End: 2024-10-29
Payer: MEDICARE

## 2025-01-02 NOTE — PROGRESS NOTES
Last Appt. 10/9/2024   Next Appt. 4/9/2025   RX Pended     This encounter was created in error - please disregard.

## 2025-01-08 ENCOUNTER — TELEMEDICINE (OUTPATIENT)
Dept: BEHAVIORAL HEALTH | Facility: CLINIC | Age: 35
End: 2025-01-08
Payer: MEDICARE

## 2025-01-08 DIAGNOSIS — F90.2 ATTENTION DEFICIT HYPERACTIVITY DISORDER, COMBINED TYPE: ICD-10-CM

## 2025-01-08 DIAGNOSIS — F31.9 BIPOLAR 1 DISORDER (HCC): ICD-10-CM

## 2025-01-08 PROCEDURE — 99214 OFFICE O/P EST MOD 30 MIN: CPT | Mod: 95 | Performed by: PSYCHIATRY & NEUROLOGY

## 2025-01-08 RX ORDER — OLANZAPINE 10 MG/1
10 TABLET ORAL NIGHTLY
Qty: 90 TABLET | Refills: 0 | Status: SHIPPED | OUTPATIENT
Start: 2025-01-08 | End: 2025-04-08

## 2025-01-08 RX ORDER — DEXTROAMPHETAMINE SACCHARATE, AMPHETAMINE ASPARTATE, DEXTROAMPHETAMINE SULFATE AND AMPHETAMINE SULFATE 1.25; 1.25; 1.25; 1.25 MG/1; MG/1; MG/1; MG/1
5 TABLET ORAL 3 TIMES DAILY
Qty: 90 TABLET | Refills: 0 | Status: SHIPPED | OUTPATIENT
Start: 2025-01-08 | End: 2025-02-07

## 2025-01-08 NOTE — PROGRESS NOTES
[facility]   Follow Up Assessment  This evaluation was conducted via Teams using secure and encrypted videoconferencing technology. The patient was in their home in the [facility].    The patient's identity was confirmed and verbal consent was obtained for this virtual visit.    This visit was conducted via Zoom using secure and encrypted videoconferencing technology.  The patient was in a private location in the [facility].  The patient's identity was confirmed and verbal consent was obtained for this virtual visit.    This provider informed the patient their medical records are totally confidential except for the use by other providers involved in their care, or if the patient signs a release, or to report instances of child or elder abuse, or if it is determined they are an immediate risk to harm themselves or others.    Name: Nanci Mccormack  MRN: [id number]  : 1990  Age: 34 y.o.  Date of assessment: 2025  PCP: Isabel Medrano M.D.    Subjective:  Chart reviewed prior to the virtual visit in her home.  She was last seen in late August and was supposed to's be seen on a monthly basis.  She is now working 12-hour shifts.  We reviewed her current medication combination, purposes, doses, etc.  She does like Zyprexa 10 mg at bedtime for her bipolar disorder.  She would like to be able to take Adderall 5 mg 3 times daily.    Objective:  She is alert, oriented, and cooperative.  Relatedness is good.  Grooming is good.  Speech is normal rate.  Anxious.  Memory is good.  Insight and judgment are fairly good.  No indication of psychotic thinking.    Current Risk:       Suicidal: Not suicidal       Homicidal: Not homicidal       Self-Harm: No plan to harm self       Relapse(Low/Moderate/High):: Moderate       Crisis Safety Plan Reviewed: Discussed with patient    Diagnosis:   Bipolar 1 disorder  Rule out schizoaffective disorder  PTSD  ADHD    Treatment Plan:  The current treatment  plan consists of quarterly psychiatric sessions designed to evaluate her bipolar disorder and ADHD.    Duration will be for a minimum of 12 months and will be reviewed at each visit.    Goals: Stabilization of moods with improvement in ADHD symptoms in order to prevent relapse due to the chronic nature of her behavioral health problems and mental illness.  Continue Zyprexa 10 mg at bedtime.  Change Adderall to 5 mg 3 times daily.      Pranav Clay M.D.    This note was created using voice recognition software (Dragon). The accuracy of the dictation is limited by the abilities of the software. I have reviewed the note prior to signing, however some errors in grammar and context are still possible. If you have any questions related to this note please do not hesitate to contact our office.

## 2025-01-28 ENCOUNTER — OFFICE VISIT (OUTPATIENT)
Dept: MEDICAL GROUP | Facility: MEDICAL CENTER | Age: 35
End: 2025-01-28
Payer: MEDICARE

## 2025-01-28 VITALS
OXYGEN SATURATION: 99 % | TEMPERATURE: 98.5 F | HEART RATE: 95 BPM | HEIGHT: 61 IN | WEIGHT: 102.6 LBS | DIASTOLIC BLOOD PRESSURE: 64 MMHG | BODY MASS INDEX: 19.37 KG/M2 | SYSTOLIC BLOOD PRESSURE: 102 MMHG

## 2025-01-28 DIAGNOSIS — R11.2 NON-INTRACTABLE VOMITING WITH NAUSEA: ICD-10-CM

## 2025-01-28 DIAGNOSIS — G43.109 MIGRAINE WITH AURA AND WITHOUT STATUS MIGRAINOSUS, NOT INTRACTABLE: ICD-10-CM

## 2025-01-28 DIAGNOSIS — Z30.011 OCP (ORAL CONTRACEPTIVE PILLS) INITIATION: ICD-10-CM

## 2025-01-28 DIAGNOSIS — G35 MULTIPLE SCLEROSIS (HCC): ICD-10-CM

## 2025-01-28 PROCEDURE — 99214 OFFICE O/P EST MOD 30 MIN: CPT | Performed by: STUDENT IN AN ORGANIZED HEALTH CARE EDUCATION/TRAINING PROGRAM

## 2025-01-28 PROCEDURE — 3074F SYST BP LT 130 MM HG: CPT | Performed by: STUDENT IN AN ORGANIZED HEALTH CARE EDUCATION/TRAINING PROGRAM

## 2025-01-28 PROCEDURE — 3078F DIAST BP <80 MM HG: CPT | Performed by: STUDENT IN AN ORGANIZED HEALTH CARE EDUCATION/TRAINING PROGRAM

## 2025-01-28 RX ORDER — ACETAMINOPHEN AND CODEINE PHOSPHATE 120; 12 MG/5ML; MG/5ML
1 SOLUTION ORAL DAILY
Qty: 84 TABLET | Refills: 3 | Status: SHIPPED | OUTPATIENT
Start: 2025-01-28

## 2025-01-28 RX ORDER — ONDANSETRON 4 MG/1
TABLET, ORALLY DISINTEGRATING ORAL
Qty: 30 TABLET | Refills: 0 | Status: SHIPPED | OUTPATIENT
Start: 2025-01-28

## 2025-01-28 ASSESSMENT — FIBROSIS 4 INDEX: FIB4 SCORE: 0.43

## 2025-01-28 NOTE — PROGRESS NOTES
"Subjective:     CC: Birth control options    HPI:   Nanci presents today to start birth control pills.  She does have a history of migraine with aura.  She would rather do a pill than an injection medication for contraceptive.  Her period just ended.     Problem   Multiple Sclerosis (Hcc)    This is a chronic condition.  She has previously followed with neurology but has not been able to see them in over a year.  She is requesting a new referral       ROS:  ROS    Objective:     Exam:  /64 (BP Location: Left arm, Patient Position: Sitting, BP Cuff Size: Adult)   Pulse 95   Temp 36.9 °C (98.5 °F) (Temporal)   Ht 1.549 m (5' 1\")   Wt 46.5 kg (102 lb 9.6 oz)   SpO2 99%   BMI 19.39 kg/m²  Body mass index is 19.39 kg/m².    Physical Exam  Vitals reviewed.   Constitutional:       General: She is not in acute distress.     Appearance: She is not toxic-appearing.   HENT:      Head: Normocephalic and atraumatic.      Right Ear: External ear normal.      Left Ear: External ear normal.   Eyes:      General:         Right eye: No discharge.         Left eye: No discharge.      Extraocular Movements: Extraocular movements intact.      Conjunctiva/sclera: Conjunctivae normal.   Pulmonary:      Effort: Pulmonary effort is normal. No respiratory distress.   Skin:     General: Skin is warm and dry.   Neurological:      Mental Status: She is alert.   Psychiatric:         Mood and Affect: Mood normal.         Behavior: Behavior normal.         Thought Content: Thought content normal.         Judgment: Judgment normal.           Assessment & Plan:     35 y.o. female with the following -     1. Multiple sclerosis (HCC)  Referral to neurology sent as urgent as it has been sometime since she has seen them   - Referral to Neurology    2. Non-intractable vomiting with nausea  Refill given  - ondansetron (ZOFRAN ODT) 4 MG TABLET DISPERSIBLE; DISSOLVE 1 TABLET IN MOUTH EVERY 6 HOURS AS NEEDED FOR NAUSEA AND VOMITING  Dispense: " 30 Tablet; Refill: 0    3. OCP (oral contraceptive pills) initiation  We discussed starting a progestin only OCP due to her migraine with aura.  Pregnancy test negative today.  3 months with some refills given  - norethindrone (MICRONOR) 0.35 MG tablet; Take 1 Tablet by mouth every day.  Dispense: 84 Tablet; Refill: 3  - POCT Pregnancy    4. Migraine with aura and without status migrainosus, not intractable  Discussed that it is not advisable to have estrogen with migraines with aura        No follow-ups on file.    Please note that this dictation was created using voice recognition software. I have made every reasonable attempt to correct obvious errors, but I expect that there are errors of grammar and possibly content that I did not discover before finalizing the note.

## 2025-02-04 ENCOUNTER — TELEPHONE (OUTPATIENT)
Dept: HEALTH INFORMATION MANAGEMENT | Facility: OTHER | Age: 35
End: 2025-02-04
Payer: MEDICARE

## 2025-02-10 ENCOUNTER — OFFICE VISIT (OUTPATIENT)
Dept: BEHAVIORAL HEALTH | Facility: CLINIC | Age: 35
End: 2025-02-10
Payer: MEDICARE

## 2025-02-10 DIAGNOSIS — F31.9 BIPOLAR 1 DISORDER (HCC): ICD-10-CM

## 2025-02-10 DIAGNOSIS — F90.2 ATTENTION DEFICIT HYPERACTIVITY DISORDER, COMBINED TYPE: ICD-10-CM

## 2025-02-11 NOTE — PROGRESS NOTES
[facility]   Follow Up Assessment     This provider informed the patient their medical records are totally confidential except for the use by other providers involved in their care, or if the patient signs a release, or to report instances of child or elder abuse, or if it is determined they are an immediate risk to harm themselves or others.    Name: Nanci Mccormack  MRN: [id number]  : 1990  Age: 35 y.o.  Date of assessment: 2/10/2025  PCP: Isabel Medrano M.D.      Subjective:  Chart reviewed prior to seeing her in my office.  She was last seen on .  She needed complicated to be completed every 6 months.  We reviewed her current medication combination, purposes, doses, etc.  No medication changes requested.  She is working 12-hour shifts and needs to take time off her leg.    Objective:  He is alert, oriented, and cooperative.  Relatedness is good.  Grooming is good.  Speech is normal rate.  Anxious.  Memory is good.  Insight and judgment are fairly good.  No indication of psychotic thinking.    Current Risk:       Suicidal: Not suicidal       Homicidal: Not homicidal       Self-Harm: No plan to harm self       Relapse: (Low/Moderate/High): Moderate       Crisis Safety Plan Reviewed: Discussed with patient    Diagnosis:   Bipolar 1 disorder  R/O Schizoaffective disorder  PTSD  ADHD    Treatment Plan:  The current treatment plan consists of monthly psychiatric sessions designed to evaluate her bipolar 1 disorder, PTSD, and ADHD.    Duration will be for a minimum of 12 months and will be reviewed at each visit.    Goals: Stabilization of moods with improvement in ADHD symptoms in order to prevent relapse due to the chronic nature of her behavioral health problems and mental illness.  Continue Zyprexa 10 mg at bedtime.  Continue Adderall 5 mg 3 times daily.    Pranav Clay M.D.      This note was created using voice recognition software (Dragon). The accuracy of the dictation  is limited by the abilities of the software. I have reviewed the note prior to signing, however some errors in grammar and context are still possible. If you have any questions related to this note please do not hesitate to contact our office.

## 2025-03-10 ENCOUNTER — OFFICE VISIT (OUTPATIENT)
Dept: BEHAVIORAL HEALTH | Facility: CLINIC | Age: 35
End: 2025-03-10
Payer: MEDICARE

## 2025-03-10 DIAGNOSIS — F31.9 BIPOLAR 1 DISORDER (HCC): ICD-10-CM

## 2025-03-10 DIAGNOSIS — F90.2 ATTENTION DEFICIT HYPERACTIVITY DISORDER, COMBINED TYPE: ICD-10-CM

## 2025-03-10 PROCEDURE — 99214 OFFICE O/P EST MOD 30 MIN: CPT | Performed by: PSYCHIATRY & NEUROLOGY

## 2025-03-10 RX ORDER — DEXTROAMPHETAMINE SACCHARATE, AMPHETAMINE ASPARTATE, DEXTROAMPHETAMINE SULFATE AND AMPHETAMINE SULFATE 1.25; 1.25; 1.25; 1.25 MG/1; MG/1; MG/1; MG/1
5 TABLET ORAL 3 TIMES DAILY
Qty: 90 TABLET | Refills: 0 | Status: SHIPPED | OUTPATIENT
Start: 2025-03-10 | End: 2025-04-09

## 2025-03-10 RX ORDER — OLANZAPINE 10 MG/1
10 TABLET ORAL NIGHTLY
Qty: 90 TABLET | Refills: 0 | Status: SHIPPED | OUTPATIENT
Start: 2025-03-10 | End: 2025-06-08

## 2025-03-10 NOTE — PROGRESS NOTES
[facility]   Follow Up Assessment     This provider informed the patient their medical records are totally confidential except for the use by other providers involved in their care, or if the patient signs a release, or to report instances of child or elder abuse, or if it is determined they are an immediate risk to harm themselves or others.    Name: Nanci Mccormack  MRN: [id number]  : 1990  Age: 35 y.o.  Date of assessment: 3/10/2025  PCP: Isabel Medrano M.D.      Subjective:  Chart reviewed prior to seeing her in my office.  She was seen most recently on February 10.  We reviewed her current medication combination.  She likes the effects of Zyprexa 10 mg at bedtime for her bipolar symptoms.  Adderall 5 mg 3 times daily is helping her ADHD and she prefers not to make a dose change.    Objective:  She is alert, oriented, and cooperative.  Relatedness is good.  Grooming is good.  Speech is normal rate.  Less anxious.  Memory is good.  Insight and judgment are fairly good.    Current Risk:       Suicidal: Not suicidal       Homicidal: Not homicidal       Self-Harm: No plan to harm self       Relapse: (Low/Moderate/High): Moderate       Crisis Safety Plan Reviewed: Discussed with patient    Diagnosis:   Bipolar 1 disorder  Rule out schizoaffective disorder  PTSD  ADHD    Treatment Plan:  The current treatment plan consists of quarterly psychiatric sessions designed to evaluate her bipolar disorder and ADHD.    Duration will be for a minimum of 12 months and will be reviewed at each visit.    Goals: Stabilization of moods with improvement in ADHD symptoms in order to prevent relapse due to the chronic nature of her behavioral health problems and mental illness.  Continue Zyprexa 10 mg at bedtime.  Continue Adderall 5 mg 3 times daily.    Pranav Clay M.D.      This note was created using voice recognition software (Dragon). The accuracy of the dictation is limited by the abilities of  the software. I have reviewed the note prior to signing, however some errors in grammar and context are still possible. If you have any questions related to this note please do not hesitate to contact our office.

## 2025-04-18 DIAGNOSIS — R11.2 NON-INTRACTABLE VOMITING WITH NAUSEA: ICD-10-CM

## 2025-04-21 RX ORDER — ONDANSETRON 4 MG/1
TABLET, ORALLY DISINTEGRATING ORAL
Qty: 30 TABLET | Refills: 0 | Status: SHIPPED | OUTPATIENT
Start: 2025-04-21

## 2025-05-07 DIAGNOSIS — F90.2 ATTENTION DEFICIT HYPERACTIVITY DISORDER, COMBINED TYPE: ICD-10-CM

## 2025-05-07 RX ORDER — DEXTROAMPHETAMINE SACCHARATE, AMPHETAMINE ASPARTATE, DEXTROAMPHETAMINE SULFATE AND AMPHETAMINE SULFATE 1.25; 1.25; 1.25; 1.25 MG/1; MG/1; MG/1; MG/1
5 TABLET ORAL 3 TIMES DAILY
Qty: 90 TABLET | Refills: 0 | Status: SHIPPED | OUTPATIENT
Start: 2025-05-07 | End: 2025-06-06

## 2025-05-07 NOTE — TELEPHONE ENCOUNTER
Received request via: Patient    Was the patient seen in the last year in this department? Yes    Does the patient have an active prescription (recently filled or refills available) for medication(s) requested? No    Pharmacy Name: [facility]    Does the patient have [facility] and need 100-day supply? (This applies to ALL medications) Patient does not have SCP

## 2025-06-06 ENCOUNTER — OFFICE VISIT (OUTPATIENT)
Dept: BEHAVIORAL HEALTH | Facility: CLINIC | Age: 35
End: 2025-06-06
Payer: MEDICARE

## 2025-06-06 ENCOUNTER — PATIENT MESSAGE (OUTPATIENT)
Dept: BEHAVIORAL HEALTH | Facility: CLINIC | Age: 35
End: 2025-06-06
Payer: MEDICARE

## 2025-06-06 DIAGNOSIS — F90.2 ATTENTION DEFICIT HYPERACTIVITY DISORDER, COMBINED TYPE: ICD-10-CM

## 2025-06-06 DIAGNOSIS — F31.9 BIPOLAR 1 DISORDER (HCC): Primary | ICD-10-CM

## 2025-06-06 DIAGNOSIS — F51.4 NIGHT TERROR DISORDER: ICD-10-CM

## 2025-06-06 DIAGNOSIS — F51.5 NIGHTMARES ASSOCIATED WITH CHRONIC POST-TRAUMATIC STRESS DISORDER: ICD-10-CM

## 2025-06-06 DIAGNOSIS — F43.12 NIGHTMARES ASSOCIATED WITH CHRONIC POST-TRAUMATIC STRESS DISORDER: ICD-10-CM

## 2025-06-06 PROCEDURE — 99214 OFFICE O/P EST MOD 30 MIN: CPT | Performed by: PSYCHIATRY & NEUROLOGY

## 2025-06-06 RX ORDER — OLANZAPINE 10 MG/1
10 TABLET, FILM COATED ORAL NIGHTLY
Qty: 90 TABLET | Refills: 0 | Status: SHIPPED | OUTPATIENT
Start: 2025-06-06 | End: 2025-09-04

## 2025-06-06 RX ORDER — DEXTROAMPHETAMINE SACCHARATE, AMPHETAMINE ASPARTATE, DEXTROAMPHETAMINE SULFATE AND AMPHETAMINE SULFATE 2.5; 2.5; 2.5; 2.5 MG/1; MG/1; MG/1; MG/1
5 TABLET ORAL 3 TIMES DAILY
Qty: 45 TABLET | Refills: 0 | Status: SHIPPED | OUTPATIENT
Start: 2025-06-06 | End: 2025-06-09 | Stop reason: SDUPTHER

## 2025-06-06 RX ORDER — PRAZOSIN HYDROCHLORIDE 1 MG/1
1 CAPSULE ORAL NIGHTLY
Qty: 30 CAPSULE | Refills: 0 | Status: SHIPPED | OUTPATIENT
Start: 2025-06-06 | End: 2025-07-06

## 2025-06-06 NOTE — PROGRESS NOTES
[facility]   Follow Up Assessment     This provider informed the patient their medical records are totally confidential except for the use by other providers involved in their care, or if the patient signs a release, or to report instances of child or elder abuse, or if it is determined they are an immediate risk to harm themselves or others.    Name: Nanci Mccormack  MRN: [id number]  : 1990  Age: 35 y.o.  Date of assessment: 2025  PCP: Isabel Medrano M.D.      Subjective:  Chart reviewed prior to seeing her in my office.  She was seen most recently on March 10.  We reviewed her medications, purposes, doses, etc.  Zyprexa 10 mg at bedtime is working well for her bipolar symptoms.  She would like to increase Adderall from 5 mg 3 times daily to 10 mg 3 times daily for her ADHD.  She is struggling with nightmares involving an elevator that falls and crashes.  She is agreeable to trying prazosin starting at a dose of 1 mg at bedtime.    Objective:  He is alert, oriented, and cooperative.  Relatedness is good.  Grooming is good.  Speech is normal rate.  Anxious.  Memory is good.  Insight and judgment are fairly good.  No indication of psychotic thinking.    Current Risk:       Suicidal: Not suicidal       Homicidal: Not homicidal       Self-Harm: No plan to harm self       Relapse: (Low/Moderate/High): Moderate       Crisis Safety Plan Reviewed: Discussed with patient    Diagnosis:   Bipolar 1 disorder  Rule out schizoaffective disorder  ADHD  Nightmares  PTSD    Treatment Plan:  The current treatment plan consists of a follow-up visit in 1 month and then quarterly psychiatric sessions designed to evaluate her bipolar disorder, ADHD, and nightmares.  Continue Zyprexa 10 mg at bedtime.  Increase Adderall to 10 mg 3 times daily.  Start prazosin 1 mg at bedtime for nightmares.  Possible side effects discussed.        Pranav Clay M.D.      This note was created using voice recognition  software (Dragon). The accuracy of the dictation is limited by the abilities of the software. I have reviewed the note prior to signing, however some errors in grammar and context are still possible. If you have any questions related to this note please do not hesitate to contact our office.

## 2025-06-09 RX ORDER — DEXTROAMPHETAMINE SACCHARATE, AMPHETAMINE ASPARTATE, DEXTROAMPHETAMINE SULFATE AND AMPHETAMINE SULFATE 2.5; 2.5; 2.5; 2.5 MG/1; MG/1; MG/1; MG/1
10 TABLET ORAL 3 TIMES DAILY
Qty: 90 TABLET | Refills: 0 | Status: SHIPPED | OUTPATIENT
Start: 2025-06-09 | End: 2025-06-26

## 2025-06-26 DIAGNOSIS — F90.2 ATTENTION DEFICIT HYPERACTIVITY DISORDER, COMBINED TYPE: Primary | ICD-10-CM

## 2025-06-26 RX ORDER — DEXTROAMPHETAMINE SACCHARATE, AMPHETAMINE ASPARTATE, DEXTROAMPHETAMINE SULFATE AND AMPHETAMINE SULFATE 3.75; 3.75; 3.75; 3.75 MG/1; MG/1; MG/1; MG/1
15 TABLET ORAL 2 TIMES DAILY
Qty: 60 TABLET | Refills: 0 | Status: SHIPPED | OUTPATIENT
Start: 2025-06-26 | End: 2025-07-26

## 2025-07-09 ENCOUNTER — APPOINTMENT (OUTPATIENT)
Dept: BEHAVIORAL HEALTH | Facility: CLINIC | Age: 35
End: 2025-07-09
Payer: MEDICARE

## 2025-07-15 ENCOUNTER — OFFICE VISIT (OUTPATIENT)
Dept: BEHAVIORAL HEALTH | Facility: CLINIC | Age: 35
End: 2025-07-15
Payer: MEDICARE

## 2025-07-15 DIAGNOSIS — F90.2 ATTENTION DEFICIT HYPERACTIVITY DISORDER, COMBINED TYPE: ICD-10-CM

## 2025-07-15 DIAGNOSIS — F31.9 BIPOLAR 1 DISORDER (HCC): Primary | ICD-10-CM

## 2025-07-15 DIAGNOSIS — F51.4 NIGHT TERROR DISORDER: ICD-10-CM

## 2025-07-15 PROCEDURE — 99214 OFFICE O/P EST MOD 30 MIN: CPT | Performed by: PSYCHIATRY & NEUROLOGY

## 2025-07-15 RX ORDER — DEXTROAMPHETAMINE SACCHARATE, AMPHETAMINE ASPARTATE, DEXTROAMPHETAMINE SULFATE AND AMPHETAMINE SULFATE 3.75; 3.75; 3.75; 3.75 MG/1; MG/1; MG/1; MG/1
15 TABLET ORAL 3 TIMES DAILY
Qty: 90 TABLET | Refills: 0 | Status: SHIPPED | OUTPATIENT
Start: 2025-07-15 | End: 2025-07-16

## 2025-07-15 RX ORDER — PRAZOSIN HYDROCHLORIDE 1 MG/1
1 CAPSULE ORAL NIGHTLY
Qty: 90 CAPSULE | Refills: 0 | Status: SHIPPED | OUTPATIENT
Start: 2025-07-15 | End: 2025-10-13

## 2025-07-15 NOTE — PROGRESS NOTES
[facility]   Follow Up Assessment     This provider informed the patient their medical records are totally confidential except for the use by other providers involved in their care, or if the patient signs a release, or to report instances of child or elder abuse, or if it is determined they are an immediate risk to harm themselves or others.    Name: Nanci Mccormack  MRN: [id number]  : 1990  Age: 35 y.o.  Date of assessment: 7/15/2025  PCP: Isabel Medrano M.D.      Subjective:  Chart reviewed prior to seeing her in my office.  She was last seen on .  We reviewed her medication combination, purposes, doses, etc.  Zyprexa 10 mg at bedtime is working well for her bipolar disorder.  She likes the effect of prazosin 1 mg at bedtime. It is helping to eliminate her nightmares.  Adderall will be increased to 15 mg 3 times daily.    Objective:  Oriented, and cooperative.  Relatedness is good.  Grooming is good.  Speech is normal rate.  Anxious.  Memory is good.  Insight and judgment are good.  No indication of psychotic thinking.    Current Risk:       Suicidal: Not suicidal       Homicidal: Not homicidal       Self-Harm: No plan to harm self       Relapse: (Low/Moderate/High): Moderate       Crisis Safety Plan Reviewed: Discussed with patient    Diagnosis:   Bipolar 1 disorder  Rule out schizoaffective disorder  ADHD  Nightmares  PTSD    Treatment Plan:  The current treatment plan consists of a follow-up visit in 1 month and then quarterly psychiatric sessions designed to evaluate her bipolar disorder, ADHD, nightmares, and PTSD.    Duration will be for a minimum of 12 months and will be reviewed at each visit.      Goals: Stabilization of moods with improvement in ADHD symptoms and elimination of nightmares Continue Zyprexa 10 mg at bedtime.  Increase Adderall to 15 mg 3 times daily.  Continue prazosin 1 mg at bedtime.     Pranav Clay M.D.      This note was created using voice  recognition software (Dragon). The accuracy of the dictation is limited by the abilities of the software. I have reviewed the note prior to signing, however some errors in grammar and context are still possible. If you have any questions related to this note please do not hesitate to contact our office.

## 2025-07-16 RX ORDER — DEXTROAMPHETAMINE SACCHARATE, AMPHETAMINE ASPARTATE, DEXTROAMPHETAMINE SULFATE AND AMPHETAMINE SULFATE 3.75; 3.75; 3.75; 3.75 MG/1; MG/1; MG/1; MG/1
1 TABLET ORAL 3 TIMES DAILY
Qty: 90 TABLET | Refills: 0 | Status: SHIPPED | OUTPATIENT
Start: 2025-07-16 | End: 2025-08-15

## 2025-08-01 DIAGNOSIS — F90.2 ATTENTION DEFICIT HYPERACTIVITY DISORDER, COMBINED TYPE: ICD-10-CM

## 2025-08-01 RX ORDER — DEXTROAMPHETAMINE SACCHARATE, AMPHETAMINE ASPARTATE, DEXTROAMPHETAMINE SULFATE AND AMPHETAMINE SULFATE 3.75; 3.75; 3.75; 3.75 MG/1; MG/1; MG/1; MG/1
1 TABLET ORAL 3 TIMES DAILY
Qty: 90 TABLET | Refills: 0 | Status: SHIPPED | OUTPATIENT
Start: 2025-08-01 | End: 2025-08-31

## 2025-08-15 ENCOUNTER — OFFICE VISIT (OUTPATIENT)
Dept: BEHAVIORAL HEALTH | Facility: CLINIC | Age: 35
End: 2025-08-15
Payer: MEDICARE

## 2025-08-15 DIAGNOSIS — F33.2 SEVERE EPISODE OF RECURRENT MAJOR DEPRESSIVE DISORDER, WITHOUT PSYCHOTIC FEATURES (HCC): Primary | ICD-10-CM

## 2025-08-15 PROCEDURE — 99999 PR NO CHARGE: CPT | Performed by: PSYCHIATRY & NEUROLOGY

## (undated) DEVICE — DRAPE SURGICAL U 77X120 - (10/CA)

## (undated) DEVICE — SUTURE 3-0 CHROMIC GUT SH 27 (36PK/BX)"

## (undated) DEVICE — SUCTION INSTRUMENT YANKAUER BULBOUS TIP W/O VENT (50EA/CA)

## (undated) DEVICE — CHLORAPREP 26 ML APPLICATOR - ORANGE TINT(25/CA)

## (undated) DEVICE — SYRINGE EAR/NOSE 3 OZ STERILE (50/CA

## (undated) DEVICE — SUTURE 3-0 SILK SH 30 (36PK/BX)

## (undated) DEVICE — GLOVE SZ 7 BIOGEL PI MICRO - PF LF (50PR/BX 4BX/CA)

## (undated) DEVICE — SODIUM CHL IRRIGATION 0.9% 1000ML (12EA/CA)

## (undated) DEVICE — IV TUBING HI-FLO RATE W/CLAMP (50/CA)

## (undated) DEVICE — CATHETER IV 14 GA X 2 ---SURG.& SDS ONLY---(200EA/CA)

## (undated) DEVICE — NEEDLE NON SAFETY 25 GA X 1 1/2 IN HYPO (100EA/BX)

## (undated) DEVICE — SYRINGE 10 ML CONTROL LL (25EA/BX 4BX/CA)

## (undated) DEVICE — ELECTRODE DUAL RETURN W/ CORD - (50/PK)

## (undated) DEVICE — NEEDLE SAFETY 18 GA X 1 1/2 IN (100EA/BX)

## (undated) DEVICE — TUBING CLEARLINK DUO-VENT - C-FLO (48EA/CA)

## (undated) DEVICE — BANDAGE ROLL STERILE BULKEE 4.5 IN X 4 YD (100EA/CA)

## (undated) DEVICE — CORDS BIPOLAR COAGULATION - 12FT STERILE DISP. (10EA/BX)

## (undated) DEVICE — GOWN SURGEONS X-LARGE - DISP. (30/CA)

## (undated) DEVICE — NEEDLE NON SAFETY HYPO 22 GA X 1 1/2 IN (100/BX)

## (undated) DEVICE — SET EXTENSION WITH 2 PORTS (48EA/CA) ***PART #2C8610 IS A SUBSTITUTE*****

## (undated) DEVICE — KIT ROOM DECONTAMINATION

## (undated) DEVICE — BOVIE NEEDLE TIP 3CM COLORADO

## (undated) DEVICE — GLOVE BIOGEL PI INDICATOR SZ 7.5 SURGICAL PF LF -(50/BX 4BX/CA)

## (undated) DEVICE — SYRINGE SAFETY 10 ML 18 GA X 1 1/2 BLUNT LL (100/BX 4BX/CA)

## (undated) DEVICE — PACK MINOR BASIN - (2EA/CA)

## (undated) DEVICE — BLADE SURGICAL #11 - (50/BX)

## (undated) DEVICE — HEAD HOLDER JUNIOR/ADULT

## (undated) DEVICE — BOVIE BLADE COATED - (50/PK)

## (undated) DEVICE — SENSOR SPO2 NEO LNCS ADHESIVE (20/BX) SEE USER NOTES

## (undated) DEVICE — GLOVE BIOGEL PI INDICATOR SZ 8.0 SURGICAL PF LF -(50/BX 4BX/CA)

## (undated) DEVICE — GLOVE BIOGEL SZ 7.5 SURGICAL PF LTX - (50PR/BX 4BX/CA)

## (undated) DEVICE — SET LEADWIRE 5 LEAD BEDSIDE DISPOSABLE ECG (1SET OF 5/EA)

## (undated) DEVICE — LACTATED RINGERS INJ 1000 ML - (14EA/CA 60CA/PF)

## (undated) DEVICE — KIT ANESTHESIA W/CIRCUIT & 3/LT BAG W/FILTER (20EA/CA)

## (undated) DEVICE — SUTURE 0 SILK 12 X 18 (36PK/BX)

## (undated) DEVICE — DECANTER FLD BLS - (50/CA)

## (undated) DEVICE — SENSOR OXIMETER ADULT SPO2 RD SET (20EA/BX)

## (undated) DEVICE — PROTECTOR ULNA NERVE - (36PR/CA)

## (undated) DEVICE — DRAPE LARGE 3 QUARTER - (20/CA)

## (undated) DEVICE — TUBE E-T HI-LO CUFF 6.5MM (10EA/BX)

## (undated) DEVICE — DETERGENT RENUZYME PLUS 10 OZ PACKET (50/BX)

## (undated) DEVICE — SYRINGE ASEPTO - (50EA/CA

## (undated) DEVICE — GOWN SURGICAL X-LARGE ULTRA - FILM-REINFORCED (20/CA)

## (undated) DEVICE — SWAB ANAEROBIC SPEC.COLLECTOR - (25/PK 4PK/CA 100EA/CA)

## (undated) DEVICE — SUTURE GENERAL

## (undated) DEVICE — GLOVE BIOGEL PI INDICATOR SZ 7.0 SURGICAL PF LF - (50/BX 4BX/CA)

## (undated) DEVICE — BLADE SURGICAL #15 - (50/BX 3BX/CA)

## (undated) DEVICE — JAW BRA #93

## (undated) DEVICE — GOWN WARMING STANDARD FLEX - (30/CA)

## (undated) DEVICE — SLEEVE VASO CALF MED - (10PR/CA)

## (undated) DEVICE — NEPTUNE 4 PORT MANIFOLD - (20/PK)

## (undated) DEVICE — GLOVE BIOGEL PI ORTHO SZ 8 PF LF (40PR/BX)

## (undated) DEVICE — GLOVE BIOGEL PI ORTHO SZ 7.5 PF LF (40PR/BX)

## (undated) DEVICE — SPONGE GAUZESTER 4 X 4 4PLY - (128PK/CA)

## (undated) DEVICE — CANISTER SUCTION 3000ML MECHANICAL FILTER AUTO SHUTOFF MEDI-VAC NONSTERILE LF DISP  (40EA/CA)

## (undated) DEVICE — ELECTRODE 850 FOAM ADHESIVE - HYDROGEL RADIOTRNSPRNT (50/PK)

## (undated) DEVICE — TOWELS CLOTH SURGICAL - (4/PK 20PK/CA)

## (undated) DEVICE — SUTURE 4-0 PROLENE PS-2 18 (36PK/BX)"

## (undated) DEVICE — STOPCOCK 3-WAY W/SWIVEL LEVER LOCK (50EA/CA)

## (undated) DEVICE — GLOVE BIOGEL SZ 7 SURGICAL PF LTX - (50PR/BX 4BX/CA)

## (undated) DEVICE — SYRINGE DISP. 60 CC LL - (30/BX, 12BX/CA)**WHEN THESE ARE GONE ORDER #500206**

## (undated) DEVICE — MASK ANESTHESIA ADULT  - (100/CA)

## (undated) DEVICE — DRAPE STRLE REG TOWEL 18X24 - (10/BX 4BX/CA)"

## (undated) DEVICE — COVER LIGHT HANDLE ALC PLUS DISP (18EA/BX)